# Patient Record
Sex: FEMALE | Race: WHITE | NOT HISPANIC OR LATINO | Employment: OTHER | URBAN - METROPOLITAN AREA
[De-identification: names, ages, dates, MRNs, and addresses within clinical notes are randomized per-mention and may not be internally consistent; named-entity substitution may affect disease eponyms.]

---

## 2017-01-04 ENCOUNTER — ALLSCRIPTS OFFICE VISIT (OUTPATIENT)
Dept: OTHER | Facility: OTHER | Age: 82
End: 2017-01-04

## 2017-01-04 DIAGNOSIS — R53.83 OTHER FATIGUE: ICD-10-CM

## 2017-03-14 DIAGNOSIS — E03.9 HYPOTHYROIDISM: ICD-10-CM

## 2017-06-21 DIAGNOSIS — E03.9 HYPOTHYROIDISM: ICD-10-CM

## 2017-08-30 DIAGNOSIS — E78.5 HYPERLIPIDEMIA: ICD-10-CM

## 2017-08-30 DIAGNOSIS — M81.0 AGE-RELATED OSTEOPOROSIS WITHOUT CURRENT PATHOLOGICAL FRACTURE: ICD-10-CM

## 2017-08-30 DIAGNOSIS — R60.9 EDEMA: ICD-10-CM

## 2017-08-30 DIAGNOSIS — I10 ESSENTIAL (PRIMARY) HYPERTENSION: ICD-10-CM

## 2017-08-30 DIAGNOSIS — R53.83 OTHER FATIGUE: ICD-10-CM

## 2017-10-02 ENCOUNTER — ALLSCRIPTS OFFICE VISIT (OUTPATIENT)
Dept: OTHER | Facility: OTHER | Age: 82
End: 2017-10-02

## 2017-11-08 ENCOUNTER — ALLSCRIPTS OFFICE VISIT (OUTPATIENT)
Dept: OTHER | Facility: OTHER | Age: 82
End: 2017-11-08

## 2017-11-08 ENCOUNTER — GENERIC CONVERSION - ENCOUNTER (OUTPATIENT)
Dept: OTHER | Facility: OTHER | Age: 82
End: 2017-11-08

## 2017-12-27 ENCOUNTER — APPOINTMENT (EMERGENCY)
Dept: CT IMAGING | Facility: HOSPITAL | Age: 82
End: 2017-12-27
Payer: MEDICARE

## 2017-12-27 ENCOUNTER — HOSPITAL ENCOUNTER (EMERGENCY)
Facility: HOSPITAL | Age: 82
Discharge: HOME | End: 2017-12-27
Attending: EMERGENCY MEDICINE
Payer: MEDICARE

## 2017-12-27 ENCOUNTER — ALLSCRIPTS OFFICE VISIT (OUTPATIENT)
Dept: OTHER | Facility: OTHER | Age: 82
End: 2017-12-27

## 2017-12-27 VITALS
TEMPERATURE: 97.8 F | RESPIRATION RATE: 16 BRPM | WEIGHT: 163.4 LBS | DIASTOLIC BLOOD PRESSURE: 94 MMHG | HEART RATE: 71 BPM | OXYGEN SATURATION: 96 % | SYSTOLIC BLOOD PRESSURE: 144 MMHG

## 2017-12-27 DIAGNOSIS — T14.8XXA HEMATOMA: Primary | ICD-10-CM

## 2017-12-27 DIAGNOSIS — S09.90XA INJURY OF HEAD, INITIAL ENCOUNTER: ICD-10-CM

## 2017-12-27 PROCEDURE — 99284 EMERGENCY DEPT VISIT MOD MDM: CPT

## 2017-12-27 PROCEDURE — 70450 CT HEAD/BRAIN W/O DYE: CPT

## 2017-12-27 RX ORDER — LEVOTHYROXINE SODIUM 0.03 MG/1
25 TABLET ORAL DAILY
COMMUNITY
End: 2018-06-18

## 2017-12-27 NOTE — ED PROVIDER NOTES
History  Chief Complaint   Patient presents with    Fall     Pt states she bent over and lost her balance, states she hit the back on her head on the ground, denies LOC, pt takes baby ASA daily  Denies c-spine tenderness     Patient brought to the emergency department via ambulance for evaluation of head trauma  She states that her shoe lace came undone and she tripped over it falling backwards striking the left parietal occipital area  She denies loss of consciousness  She denies headache visual complaints nausea vomiting neck pain or neurologic symptoms including numbness tingling weakness in the extremities  She refuses analgesics at this time  She denies chest pain syncope palpitations or dizziness  Prior to Admission Medications   Prescriptions Last Dose Informant Patient Reported? Taking?   aspirin 81 MG tablet   Yes Yes   Sig: Take 81 mg by mouth daily  atorvastatin (LIPITOR) 80 mg tablet   Yes Yes   Sig: Take 80 mg by mouth daily  benazepril (LOTENSIN) 20 mg tablet   Yes Yes   Sig: Take 10 mg by mouth daily     calcium citrate-vitamin D (CITRACAL+D) 315-200 MG-UNIT per tablet   Yes Yes   Sig: Take 3 tablets by mouth 2 (two) times a day  levothyroxine 25 mcg tablet   Yes Yes   Sig: Take 25 mcg by mouth daily   sertraline (ZOLOFT) 25 mg tablet   Yes Yes   Sig: Take 25 mg by mouth daily  tolterodine (DETROL LA) 4 mg 24 hr capsule   Yes Yes   Sig: Take 4 mg by mouth daily  Facility-Administered Medications: None       Past Medical History:   Diagnosis Date    Hyperlipidemia     Hypertension        History reviewed  No pertinent surgical history  History reviewed  No pertinent family history  I have reviewed and agree with the history as documented  Social History   Substance Use Topics    Smoking status: Former Smoker    Smokeless tobacco: Not on file    Alcohol use 4 2 oz/week     7 Glasses of wine per week        Review of Systems   Constitutional: Negative    Negative for activity change, appetite change, chills, diaphoresis, fatigue and fever  HENT: Negative  Eyes: Negative  Negative for photophobia and visual disturbance  Respiratory: Negative  Negative for chest tightness, shortness of breath, wheezing and stridor  Cardiovascular: Negative  Negative for chest pain, palpitations and leg swelling  Gastrointestinal: Negative  Negative for abdominal pain, diarrhea, nausea and vomiting  Endocrine: Negative  Genitourinary: Negative  Musculoskeletal: Negative  Negative for back pain, neck pain and neck stiffness  Skin: Positive for wound  Allergic/Immunologic: Negative  Neurological: Negative  Negative for dizziness, tremors, seizures, syncope, facial asymmetry, speech difficulty, weakness, light-headedness, numbness and headaches  Hematological: Negative  Does not bruise/bleed easily  Psychiatric/Behavioral: Negative  Physical Exam  ED Triage Vitals [12/27/17 1823]   Temperature Pulse Respirations Blood Pressure SpO2   97 8 °F (36 6 °C) 71 16 144/94 96 %      Temp Source Heart Rate Source Patient Position - Orthostatic VS BP Location FiO2 (%)   Oral Monitor Sitting Right arm --      Pain Score       No Pain           Orthostatic Vital Signs  Vitals:    12/27/17 1823   BP: 144/94   Pulse: 71   Patient Position - Orthostatic VS: Sitting       Physical Exam   Constitutional: She is oriented to person, place, and time  She appears well-developed and well-nourished  Nontoxic appearance without respiratory distress  Patient can speak in full sentences and engage in normal conversation  She seems comfortable sitting upright in the stretcher  HENT:   Head: Normocephalic  Right Ear: External ear normal    Left Ear: External ear normal    Mouth/Throat: Oropharynx is clear and moist    Large left parietal occipital hematoma without laceration or bleeding     Eyes: Conjunctivae and EOM are normal  Pupils are equal, round, and reactive to light    Neck: Normal range of motion  Neck supple  Cardiovascular: Normal rate, regular rhythm, normal heart sounds and intact distal pulses  Pulmonary/Chest: Effort normal and breath sounds normal  No respiratory distress  She has no wheezes  She has no rales  She exhibits no tenderness  Abdominal: Soft  Bowel sounds are normal  She exhibits no distension and no mass  There is no tenderness  There is no rebound and no guarding  No hernia  Musculoskeletal: Normal range of motion  She exhibits no edema, tenderness or deformity  Neurological: She is alert and oriented to person, place, and time  She has normal reflexes  She displays normal reflexes  No cranial nerve deficit or sensory deficit  She exhibits normal muscle tone  Coordination normal    Skin: Skin is warm and dry  No rash noted  No erythema  No pallor  Psychiatric: She has a normal mood and affect  Her behavior is normal  Judgment and thought content normal    Nursing note and vitals reviewed  ED Medications  Medications - No data to display    Diagnostic Studies  Results Reviewed     None                 CT head without contrast   Final Result by Basia Miranda MD (12/27 1900)      Left parietal scalp soft tissue swelling  No acute intracranial abnormality  Subcentimeter calcified right paraclinoid meningioma  Moderate chronic microvascular ischemia  Workstation performed: [id number]                    Procedures  Procedures       Phone Contacts  ED Phone Contact    ED Course  ED Course                                MDM  CritCare Time    Disposition  Final diagnoses:   Hematoma   Injury of head, initial encounter     Time reflects when diagnosis was documented in both MDM as applicable and the Disposition within this note     Time User Action Codes Description Comment    12/27/2017  7:27 PM Verenice Irizarry [address] Poncho  [id number]] Hematoma     12/27/2017  7:28 PM Kristen Irizarry Add [[id number]] Injury of head, initial encounter       ED Disposition     ED Disposition Condition Comment    Discharge  Lulu Morris discharge to home/self care  Condition at discharge: Stable        Follow-up Information     Follow up With Specialties Details Why [facility] physician  Schedule an appointment as soon as possible for a visit          Patient's Medications   Discharge Prescriptions    No medications on file     No discharge procedures on file      ED Provider  Electronically Signed by           Miguel Burgess MD  12/27/17 2390

## 2017-12-28 NOTE — DISCHARGE INSTRUCTIONS
Head Injury   WHAT YOU NEED TO KNOW:   What causes a head injury? A head injury is most often caused by a blow to the head  This may occur from a fall, bicycle injury, sports injury, being struck in the head, or a motor vehicle accident  What are the symptoms of a head injury? You may have an open wound, swelling, or bruising on your head  Right after the injury, you may be confused  Symptoms may last anywhere from a few hours to a few weeks  You may have any of the following:  · Mild to moderate headache    · Dizziness or loss of balance    · Nausea or vomiting    · Change in mood, such as feeling restless or irritable    · Trouble thinking, remembering, or concentrating    · Ringing in the ears or neck pain    · Drowsiness or decreased amount of energy    · Trouble sleeping  How is a head injury diagnosed? · Tell your healthcare provider about your injury and symptoms  The provider will do an exam to check your brain function  He or she will check how your pupils react to light  He will check your memory, hand grasp, and balance  · You may need a CT scan to check for bleeding or major damage to your skull or brain  You may be given contrast liquid to help the pictures show up better  Tell a healthcare provider if you have ever had an allergic reaction to contrast liquid  How is a head injury treated? You may be given medicine to decrease pain  Other treatments may depend on how severe your head injury is  How can I manage my symptoms? · Rest  or do quiet activities for 24 to 48 hours  Limit your time watching TV, using the computer, or doing tasks that require a lot of thinking  Slowly return to your normal activities as directed  Do not play sports or do activities that may cause you to get hit in the head  Ask your healthcare provider when you can return to sports  · Apply ice  on your head for 15 to 20 minutes every hour or as directed  Use an ice pack, or put crushed ice in a plastic bag  Cover it with a towel before you apply it to your skin  Ice helps prevent tissue damage and decreases swelling and pain  · Have someone stay with you for 24 hours  or as directed  This person can monitor you for complications and call [phone]  When you are awake the person should ask you a few questions to see if you are thinking clearly  An example would be to ask your name or your address  How can I help prevent another head injury? · Wear a helmet that fits properly  Do this when you play sports, or ride a bike, scooter, or skateboard  Helmets help decrease your risk of a serious head injury  Talk to your healthcare provider about other ways you can protect yourself if you play sports  · Wear your seat belt every time you are in a car  This helps to decrease your risk for a head injury if you are in a car accident  Call 911 or have someone else call for any of the following:   · You cannot be woken  · You have a seizure  · You stop responding to others or you faint  · You have blurry or double vision  · Your speech becomes slurred or confused  · You have arm or leg weakness, loss of feeling, or new problems with coordination  · Your pupils are larger than usual or one pupil is a different size than the other  · You have blood or clear fluid coming out of your ears or nose  When should I seek immediate care? · You have repeated or forceful vomiting  · You feel confused  · Your headache gets worse or becomes severe  · You or someone caring for you notices that you are harder to wake than usual   When should I contact my healthcare provider? · Your symptoms last longer than 6 weeks after the injury  · You have questions or concerns about your condition or care  CARE AGREEMENT:   You have the right to help plan your care  Learn about your health condition and how it may be treated  Discuss treatment options with your caregivers to decide what care you want to receive  You always have the right to refuse treatment  The above information is an  only  It is not intended as medical advice for individual conditions or treatments  Talk to your doctor, nurse or pharmacist before following any medical regimen to see if it is safe and effective for you  © 2017 [facility] Information is for End User's use only and may not be sold, redistributed or otherwise used for commercial purposes  All illustrations and images included in CareNotes® are the copyrighted property of [employer]  or Osvaldo Santana  [facility]   WHAT YOU NEED TO KNOW:   A hematoma is a collection of blood  A bruise is a type of hematoma  A hematoma may form in a muscle or in the tissues just under the skin  A hematoma that forms under the skin will feel like a bump or hard mass  Hematomas can happen anywhere in your body, including in your brain  Your body may break down and absorb a mild hematoma on its own  A more serious hematoma may need treatment  DISCHARGE INSTRUCTIONS:   Medicines: You may need any of the following:  · Prescription pain medicine  may be given  Ask how to take this medicine safely  · NSAIDs , such as ibuprofen, help decrease swelling, pain, and fever  This medicine is available with or without a doctor's order  NSAIDs can cause stomach bleeding or kidney problems in certain people  If you take blood thinner medicine, always ask your healthcare provider if NSAIDs are safe for you  Always read the medicine label and follow directions  · Antibiotics  prevent or treat a bacterial infection  · Take your medicine as directed  Contact your healthcare provider if you think your medicine is not helping or if you have side effects  Tell him of her if you are allergic to any medicine  Keep a list of the medicines, vitamins, and herbs you take  Include the amounts, and when and why you take them  Bring the list or the pill bottles to follow-up visits  Carry your medicine list with you in case of an emergency  Return to the emergency department if:   · You have new or worsening pain, or pain that does not get better with medicine  · You have a fever  · You have trouble moving the body part that has the hematoma  Contact your healthcare provider if:   · You have questions or concerns about your condition or care  Follow up with your healthcare provider as directed: You may need to have surgery if your hematoma is severe  You may also need other tests to make sure there is no other damage that needs to be treated  Write down your questions so you remember to ask them during your visits  Self-care:   · Rest the area  Rest will help your body heal and will also help prevent more damage  · Apply ice as directed  Ice helps reduce swelling  Ice may also help prevent tissue damage  Use an ice pack, or put crushed ice in a bag  Cover it with a towel  Place it on your hematoma for 20 minutes every hour, or as directed  Ask how many times each day to apply ice, and for how many days  · Compress the injury if possible  Lightly wrap the injury with an elastic or soft bandage  This may help control swelling  Ask your healthcare provider how to wrap your injury properly  · Elevate the area as directed  If possible, raise the area above the level of your heart as often as you can  This will help decrease swelling  · Keep the hematoma covered with a bandage  This will help protect the area while it heals  © 2017 [address] [employer] Information is for End User's use only and may not be sold, redistributed or otherwise used for commercial purposes  All illustrations and images included in CareNotes® are the copyrighted property of [employer] Inc  or Osvaldo Santana  The above information is an  only  It is not intended as medical advice for individual conditions or treatments   Talk to your doctor, nurse or pharmacist before following any medical regimen to see if it is safe and effective for you

## 2018-01-09 NOTE — PROGRESS NOTES
Assessment    1  Acute bronchitis (466 0) (J20 9)    Plan    1  Benzonatate 100 MG Oral Capsule; TAKE 1 CAPSULE 2-3 TIMES DAILY AS   DIRECTED   2  Levofloxacin 250 MG Oral Tablet (Levaquin); One tab daily for one week     1  bronchitis; acute, symptomatic,  Will order Tessalon Perles 200 mg every 8 when necessary  Will also order Levaquin 250 mg daily x1 week  Order to view chest x-ray rule out pneumonia     Chief Complaint  Chief Complaint Free Text Note Form: Cough      History of Present Illness  Bronchitis, Acute, Adult (Brief): Symptoms:  productive cough, wheezing, shortness of breath, fatigue, fever and myalgias, but no non-productive cough, no pleuritic chest pain and no chest pain  No associated symptoms are reported  Review of Systems  Complete-Female:   Constitutional: No fever, no chills, feels well, no tiredness, no recent weight gain or weight loss  Eyes: No complaints of eye pain, no red eyes, no eyesight problems, no discharge, no dry eyes, no itching of eyes  ENT: no complaints of earache, no loss of hearing, no nose bleeds, no nasal discharge, no sore throat, no hoarseness  Cardiovascular: No complaints of slow heart rate, no fast heart rate, no chest pain, no palpitations, no leg claudication, no lower extremity edema  Respiratory: No complaints of shortness of breath, no wheezing, no cough, no SOB on exertion, no orthopnea, no PND and as noted in HPI  Gastrointestinal: No complaints of abdominal pain, no constipation, no nausea or vomiting, no diarrhea, no bloody stools  Musculoskeletal: as noted in HPI  Integumentary: No complaints of skin rash or lesions, no itching, no skin wounds, no breast pain or lump  Neurological: as noted in HPI  Psychiatric: Not suicidal, no sleep disturbance, no anxiety or depression, no change in personality, no emotional problems     Endocrine: No complaints of proptosis, no hot flashes, no muscle weakness, no deepening of the voice, no feelings of weakness  Hematologic/Lymphatic: No complaints of swollen glands, no swollen glands in the neck, does not bleed easily, does not bruise easily  Active Problems    1  Balance problem (781 99) (R26 89)   2  Edema (782 3) (R60 9)   3  Encounter for routine gynecological examination (V72 31) (Z01 419)   4  Encounter for screening mammogram for malignant neoplasm of breast (V76 12)   (Z12 31)   5  Gait disturbance (781 2) (R26 9)   6  Hyperlipidemia (272 4) (E78 5)   7  Hypertension (401 9) (I10)   8  Neurogenic bladder (596 54) (N31 9)   9  Osteopenia (733 90) (M85 80)   10  Osteoporosis (733 00) (M81 0)   11  Primary localized osteoarthritis of right hip (715 15) (M16 11)   12  Right hip pain (719 45) (M25 551)   13  Visit for screening mammogram (V76 12) (Z12 31)    Surgical History    1  History of Dilation And Curettage    Family History  Father    1  Family history of Heart Disease (V17 49)    Social History    · Being A Social Drinker   · Former smoker (V15 82) (K08 620)   · Moderate Exercising Less Than 3 Times A Week    Current Meds   1  Aspirin 81 MG TABS; Therapy: ([place]) to Recorded   2  Atorvastatin Calcium TABS Recorded   3  Benazepril HCl - 20 MG Oral Tablet; Take 1 tablet twice daily Recorded   4  Calcium 1000 + D TABS; Therapy: ([place]) to Recorded   5  Detrol LA 4 MG Oral Capsule Extended Release 24 Hour; take 1 capsule daily; Therapy: ([place] [facility]) to Recorded   6  Lipitor 80 MG Oral Tablet; Take 1 tablet daily Recorded   7  Sertraline HCl - 25 MG Oral Tablet; Therapy: (Recorded:06Jan2016) to Recorded    Allergies    1   No Known Drug Allergies    Vitals  Signs [Data Includes: Current Encounter]    Temperature: 99 2 F  Heart Rate: 72  Respiration: 16  Systolic: 236  Diastolic: 72  Height: 5 ft 4 3 in  Weight: 155 lb   BMI Calculated: 26 36  BSA Calculated: 1 77  O2 Saturation: 91    Physical Exam  General Complete Exam (Brief):   Constitutional General appearance: No acute distress, well appearing and well nourished  Eyes   Conjunctiva and lids: No swelling, erythema, or discharge  Pupils and irises: Equal, round and reactive to light  Ears, Nose, Mouth, and Throat   Otoscopic examination: Tympanic membrance translucent with normal light reflex  Canals patent without erythema  Oropharynx: Normal with no erythema, edema, exudate or lesions  Pulmonary   Auscultation of lungs: Abnormal   Occasional rhonchi clear with cough  Cardiovascular   Auscultation of heart: Normal rate and rhythm, normal S1 and S2, without murmurs  Examination of extremities for edema and/or varicosities: Normal     Abdomen   Abdomen: Non-tender, no masses  Liver and spleen: No hepatomegaly or splenomegaly  Musculoskeletal slightly off balance  Digits and nails: Abnormal   arthritic changes  Inspection/palpation of joints, bones, and muscles: Abnormal   arthritic changes  Skin chronic changes  Neurologic   Cranial nerves: Cranial nerves 2-12 intact  Reflexes: 2+ and symmetric  Sensation: No sensory loss  Psychiatric   Orientation to person, place and time: Normal     Mood and affect: Normal        Signatures   Electronically signed by : Mirta Cole DO;  Apr 22 2016  1:12PM EST                       (Author)

## 2018-01-10 NOTE — PROGRESS NOTES
Assessment    1  Dizziness (780 4) (R42)    Plan  1  Dizziness; multifactorial   Patient's blood pressure dropped to 115/60 when she was standing  I recommended she cut her benazepril from 20 mg to 10 mg p  o  daily  She will have blood pressure checks and [facility] throughout the [facility]  Monitor and follow up  She does admit to a fall about 3 months ago where she hit her head on the end table  I did offer CT scan of her head at this time she wishes to wait to see if lowering of blood pressure medication makes her symptoms go away  Recent lab work including CMP CBC and lipids are all within normal limits  Will be having a follow-up TSH done soon as medications were recently altered  Discussion/Summary  Counseling Documentation With Imm: The was counseled regarding impressions  total time of encounter was 25 minutes and 15 minutes was spent counseling  Chief Complaint  Chief Complaint Free Text Note Form: dizzy      History of Present Illness  HPI: Patient seen and examined  Patient complains of feeling dizzy for the past 2-3 days  Worse when she stands up  No loss of consciousness  No associated nausea vomiting diarrhea and diaphoresis or chest pain  Vertigo (Brief): The patient is being seen for an initial evaluation of vertigo  Symptoms:  dizziness, but no sensation of movement, no spinning sensation, no loss of balance, no difficulty ambulating, no hearing loss, no nausea and no vomiting  No associated symptoms are reported  Review of Systems  Complete-Female:   Constitutional: as noted in HPI  Eyes: No complaints of eye pain, no red eyes, no eyesight problems, no discharge, no dry eyes, no itching of eyes  ENT: no complaints of earache, no loss of hearing, no nose bleeds, no nasal discharge, no sore throat, no hoarseness  Cardiovascular: No complaints of slow heart rate, no fast heart rate, no chest pain, no palpitations, no leg claudication, no lower extremity edema  Respiratory: No complaints of shortness of breath, no wheezing, no cough, no SOB on exertion, no orthopnea, no PND and as noted in HPI  Gastrointestinal: No complaints of abdominal pain, no constipation, no nausea or vomiting, no diarrhea, no bloody stools  Genitourinary: No complaints of dysuria, no incontinence, no pelvic pain, no dysmenorrhea, no vaginal discharge or bleeding  Musculoskeletal: No complaints of arthralgias, no myalgias, no joint swelling or stiffness, no limb pain or swelling  Integumentary: No complaints of skin rash or lesions, no itching, no skin wounds, no breast pain or lump  Neurological: as noted in HPI  Psychiatric: Not suicidal, no sleep disturbance, no anxiety or depression, no change in personality, no emotional problems  Endocrine: No complaints of proptosis, no hot flashes, no muscle weakness, no deepening of the voice, no feelings of weakness  Hematologic/Lymphatic: No complaints of swollen glands, no swollen glands in the neck, does not bleed easily, does not bruise easily  Active Problems    1  Acquired hypothyroidism (244 9) (E03 9)   2  Acute bronchitis (466 0) (J20 9)   3  Balance problem (781 99) (R26 89)   4  Edema (782 3) (R60 9)   5  Encounter for routine gynecological examination (V72 31) (Z01 419)   6  Encounter for screening mammogram for malignant neoplasm of breast (V76 12)   (Z12 31)   7  Fatigue (780 79) (R53 83)   8  Gait disturbance (781 2) (R26 9)   9  Hyperlipidemia (272 4) (E78 5)   10  Hypertension (401 9) (I10)   11  Neurogenic bladder (596 54) (N31 9)   12  Osteopenia (733 90) (M85 80)   13  Osteoporosis (733 00) (M81 0)   14  Primary localized osteoarthritis of right hip (715 15) (M16 11)   15  Right hip pain (719 45) (M25 551)   16  Visit for screening mammogram (V76 12) (Z12 31)    Surgical History    1  History of Dilation And Curettage  Surgical History Reviewed: The surgical history was reviewed and updated today         Family History  Father    1  Family history of Heart Disease (V17 49)  Family History Reviewed: The family history was reviewed and updated today  Social History    · Being A Social Drinker   · Former smoker (B77 66) (F26 040)   · Moderate Exercising Less Than 3 Times A Week  Social History Reviewed: The social history was reviewed and updated today  Current Meds   1  Aspirin 81 MG TABS; Therapy: (Joi Diop) to Recorded   2  Atorvastatin Calcium TABS Recorded   3  Benazepril HCl - 20 MG Oral Tablet; TAKE 1 TABLET DAILY AS DIRECTED; Therapy: (Recorded:[id number]) to Recorded   4  Calcium 1000 + D TABS; Therapy: (Joi Diop) to Recorded   5  Detrol LA 4 MG Oral Capsule Extended Release 24 Hour; take 1 capsule daily; Therapy: (Joi Diop) to Recorded   6  Levothyroxine Sodium 25 MCG Oral Tablet; TAKE 1 TABLET BY MOUTH DAILY; Therapy: (Recorded:31Mar2017) to Recorded   7  Lipitor 80 MG Oral Tablet; Take 1 tablet daily Recorded   8  Sertraline HCl - 25 MG Oral Tablet; Therapy: ([id number]) to Recorded  Medication List Reviewed: The medication list was reviewed and updated today  Allergies    1  No Known Drug Allergies    Vitals  Signs    Temperature: 98 3 F  Heart Rate: 66  Respiration: 18  Systolic: 805  Diastolic: 77    Physical Exam  General Complete Exam (Brief):   Constitutional   General appearance: No acute distress, well appearing and well nourished  Eyes   Conjunctiva and lids: No swelling, erythema, or discharge  Pupils and irises: Equal, round and reactive to light  Ears, Nose, Mouth, and Throat   Otoscopic examination: Tympanic membrance translucent with normal light reflex  Canals patent without erythema  Oropharynx: Normal with no erythema, edema, exudate or lesions  Pulmonary   Auscultation of lungs: Clear to auscultation  cough  Cardiovascular   Auscultation of heart: Normal rate and rhythm, normal S1 and S2, without murmurs  Examination of extremities for edema and/or varicosities: Normal     Abdomen   Abdomen: Non-tender, no masses  Liver and spleen: No hepatomegaly or splenomegaly  Musculoskeletal   Gait and station: Normal     Digits and nails: Abnormal   arthritic changes  Inspection/palpation of joints, bones, and muscles: Abnormal   arthritic changes  Skin chronic changes  Neurologic   Cranial nerves: Cranial nerves 2-12 intact  Reflexes: 2+ and symmetric  Sensation: No sensory loss      Psychiatric   Orientation to person, place and time: Normal     Mood and affect: Normal        Signatures   Electronically signed by : Paula Griffin DO; Oct  2 2017  3:14PM EST                       (Author)

## 2018-01-12 NOTE — PROGRESS NOTES
Assessment    1  Fatigue (780 79) (R53 83)    Plan    1  (1) CBC/ PLT (NO DIFF); Status:Active; Requested [id number];    2  (1) COMPREHENSIVE METABOLIC PANEL; Status:Active; Requested [id number];    3  (1) T4, FREE; Status:Active; Requested [id number];    4  (1) TSH; Status:Active; Requested [id number];     #1 fatigue; multifactorial  Patient was seen by Dr Micah Benites PMR for bilateral leg pain  Head describe some symptoms consistent with fatigue  Concerned it could be her thyroid  Patient also has diagnoses of depression  At this point I'll first check labs including a CMP CBC TSH and free T4  If this proved to be negative I may consider adjusting her depression medication  Monitor and follow-up     Chief Complaint  Chief Complaint Free Text Note Form: Fatigue      History of Present Illness  Fatigue: The patient is being seen for an initial evaluation of fatigue  Symptoms:  fatigue  The patient is currently experiencing symptoms  Associated symptoms:  somnolence, depressed mood, arthralgias, diarrhea and cold intolerance, but no cognitive impairment, no myalgias, no fever, no swollen glands, no cough, no dyspnea, no irregular heartbeat, no chest pain, no abdominal pain, no nausea, no vomiting and no constipation  Review of Systems  Complete-Female:   Constitutional: as noted in HPI  Eyes: No complaints of eye pain, no red eyes, no eyesight problems, no discharge, no dry eyes, no itching of eyes  ENT: no complaints of earache, no loss of hearing, no nose bleeds, no nasal discharge, no sore throat, no hoarseness  Cardiovascular: No complaints of slow heart rate, no fast heart rate, no chest pain, no palpitations, no leg claudication, no lower extremity edema  Respiratory: No complaints of shortness of breath, no wheezing, no cough, no SOB on exertion, no orthopnea, no PND and as noted in HPI     Gastrointestinal: No complaints of abdominal pain, no constipation, no nausea or vomiting, no diarrhea, no bloody stools  Genitourinary: No complaints of dysuria, no incontinence, no pelvic pain, no dysmenorrhea, no vaginal discharge or bleeding  Musculoskeletal: as noted in HPI  Integumentary: No complaints of skin rash or lesions, no itching, no skin wounds, no breast pain or lump  Neurological: as noted in HPI  Psychiatric: Not suicidal, no sleep disturbance, no anxiety or depression, no change in personality, no emotional problems  Endocrine: No complaints of proptosis, no hot flashes, no muscle weakness, no deepening of the voice, no feelings of weakness  Hematologic/Lymphatic: No complaints of swollen glands, no swollen glands in the neck, does not bleed easily, does not bruise easily  Active Problems    1  Acute bronchitis (466 0) (J20 9)   2  Balance problem (781 99) (R26 89)   3  Edema (782 3) (R60 9)   4  Encounter for routine gynecological examination (V72 31) (Z01 419)   5  Encounter for screening mammogram for malignant neoplasm of breast (V76 12)   (Z12 31)   6  Gait disturbance (781 2) (R26 9)   7  Hyperlipidemia (272 4) (E78 5)   8  Hypertension (401 9) (I10)   9  Neurogenic bladder (596 54) (N31 9)   10  Osteopenia (733 90) (M85 80)   11  Osteoporosis (733 00) (M81 0)   12  Primary localized osteoarthritis of right hip (715 15) (M16 11)   13  Right hip pain (719 45) (M25 551)   14  Visit for screening mammogram (V76 12) (Z12 31)    Surgical History    1  History of Dilation And Curettage    Family History  Father    1  Family history of Heart Disease (V17 49)    Social History    · Being A Social Drinker   · Former smoker (V15 82) (P04 069)   · Moderate Exercising Less Than 3 Times A Week    Current Meds   1  Aspirin 81 MG TABS; Therapy: (Brenda Walter) to Recorded   2  Atorvastatin Calcium TABS Recorded   3  Benazepril HCl - 20 MG Oral Tablet; Take 1 tablet twice daily Recorded   4  Calcium 1000 + D TABS; Therapy: (Brenda Walter) to Recorded   5   Detrol LA 4 MG Oral Capsule Extended Release 24 Hour; take 1 capsule daily; Therapy: (Artist Dawson) to Recorded   6  Lipitor 80 MG Oral Tablet; Take 1 tablet daily Recorded   7  Sertraline HCl - 25 MG Oral Tablet; Therapy: (Recorded:06Jan2016) to Recorded    Allergies    1  No Known Drug Allergies    Vitals  Signs    Temperature: 97 5 F  Heart Rate: 58  Respiration: 16  Systolic: 176  Diastolic: 70  Height: 5 ft 4 3 in  Weight: 159 lb   BMI Calculated: 27 04  BSA Calculated: 1 78  O2 Saturation: 95    Physical Exam  General Complete Exam (Brief):   Constitutional   General appearance: No acute distress, well appearing and well nourished  Eyes   Conjunctiva and lids: No swelling, erythema, or discharge  Pupils and irises: Equal, round and reactive to light  Ears, Nose, Mouth, and Throat   Otoscopic examination: Tympanic membrance translucent with normal light reflex  Canals patent without erythema  Oropharynx: Normal with no erythema, edema, exudate or lesions  Pulmonary   Auscultation of lungs: Clear to auscultation  cough  Cardiovascular   Auscultation of heart: Normal rate and rhythm, normal S1 and S2, without murmurs  Examination of extremities for edema and/or varicosities: Normal     Abdomen   Abdomen: Non-tender, no masses  Liver and spleen: No hepatomegaly or splenomegaly  Musculoskeletal   Gait and station: Normal     Digits and nails: Abnormal   arthritic changes  Inspection/palpation of joints, bones, and muscles: Abnormal   arthritic changes  Skin chronic changes  Neurologic   Cranial nerves: Cranial nerves 2-12 intact  Reflexes: 2+ and symmetric  Sensation: No sensory loss      Psychiatric   Orientation to person, place and time: Normal     Mood and affect: Normal        Provider Comments  Provider Comments:   Time 20 minutes with 15 minutes to coordination of care      Signatures   Electronically signed by : Ernesto Pulido DO; Jan 4 2017  3:56PM EST (Author)

## 2018-01-13 VITALS
DIASTOLIC BLOOD PRESSURE: 70 MMHG | WEIGHT: 159 LBS | HEART RATE: 58 BPM | SYSTOLIC BLOOD PRESSURE: 126 MMHG | BODY MASS INDEX: 27.14 KG/M2 | TEMPERATURE: 97.5 F | RESPIRATION RATE: 16 BRPM | HEIGHT: 64 IN | OXYGEN SATURATION: 95 %

## 2018-01-14 VITALS
SYSTOLIC BLOOD PRESSURE: 122 MMHG | RESPIRATION RATE: 18 BRPM | DIASTOLIC BLOOD PRESSURE: 77 MMHG | HEART RATE: 66 BPM | TEMPERATURE: 98.3 F

## 2018-01-15 NOTE — PROGRESS NOTES
Assessment    1  Right hip pain (479 45) (K80 293)    Plan   1  right hip pain; symptomatic  Radiates to right knee  Currently is doing physical therapy without relief  I'll refer to orthopedics for evaluation and treatment  History of Present Illness  Hip Pain: The patient is being seen for a routine clinic follow-up of hip pain  Symptoms:  hip pain, thigh pain and knee pain, but no groin pain, no localized bruising, no localized swelling, no localized redness, no localized warmth, no limping, no refusal to bear weight and no decreased hip range of motion  The patient is currently experiencing symptoms  No associated symptoms are reported  Current treatment includes acetaminophen and physical therapy  By report, there is good adherence with treatment, good tolerance of treatment and fair symptom control  Review of Systems  Complete-Female:   Constitutional: No fever, no chills, feels well, no tiredness, no recent weight gain or weight loss  Eyes: No complaints of eye pain, no red eyes, no eyesight problems, no discharge, no dry eyes, no itching of eyes  ENT: no complaints of earache, no loss of hearing, no nose bleeds, no nasal discharge, no sore throat, no hoarseness  Cardiovascular: No complaints of slow heart rate, no fast heart rate, no chest pain, no palpitations, no leg claudication, no lower extremity edema  Respiratory: No complaints of shortness of breath, no wheezing, no cough, no SOB on exertion, no orthopnea, no PND  Gastrointestinal: No complaints of abdominal pain, no constipation, no nausea or vomiting, no diarrhea, no bloody stools  Musculoskeletal: as noted in HPI  Integumentary: No complaints of skin rash or lesions, no itching, no skin wounds, no breast pain or lump  Neurological: as noted in HPI  Psychiatric: Not suicidal, no sleep disturbance, no anxiety or depression, no change in personality, no emotional problems     Endocrine: No complaints of proptosis, no hot flashes, no muscle weakness, no deepening of the voice, no feelings of weakness  Hematologic/Lymphatic: No complaints of swollen glands, no swollen glands in the neck, does not bleed easily, does not bruise easily  Active Problems    1  Balance problem (781 99) (R26 89)   2  Edema (782 3) (R60 9)   3  Encounter for routine gynecological examination (V72 31) (Z01 419)   4  Encounter for screening mammogram for malignant neoplasm of breast (V76 12)   (Z12 31)   5  Gait disturbance (781 2) (R26 9)   6  Hyperlipidemia (272 4) (E78 5)   7  Hypertension (401 9) (I10)   8  Neurogenic bladder (596 54) (N31 9)   9  Osteopenia (733 90) (M85 80)   10  Osteoporosis (733 00) (M81 0)   11  Visit for screening mammogram (V76 12) (Z12 31)    Surgical History    1  History of Dilation And Curettage    Family History    1  Family history of Heart Disease (V17 49)    Social History    · Being A Social Drinker   · Former smoker (V15 82) (E49 068)   · Moderate Exercising Less Than 3 Times A Week    Current Meds   1  Aspirin 81 MG Oral Tablet; Therapy: (Guerda Lay) to Recorded   2  Benazepril HCl - 20 MG Oral Tablet; Take 1 tablet twice daily Recorded   3  Calcium 1000 + D TABS; Therapy: (Guerda Lay) to Recorded   4  Detrol LA 4 MG Oral Capsule Extended Release 24 Hour; take 1 capsule daily; Therapy: (Guerda Lay) to Recorded   5  Lipitor 80 MG Oral Tablet; Take 1 tablet daily Recorded   6  Sertraline HCl - 25 MG Oral Tablet; Therapy: (Recorded:06Jan2016) to Recorded    Allergies    1  No Known Drug Allergies    Vitals  Signs [Data Includes: Current Encounter]    Temperature: 97 4 F  Heart Rate: 64  Respiration: 16  Systolic: 297  Diastolic: 60  Height: 5 ft 4 3 in  Weight: 155 lb   BMI Calculated: 26 36  BSA Calculated: 1 77  O2 Saturation: 97    Physical Exam  General Complete Exam (Brief):   Constitutional   General appearance: No acute distress, well appearing and well nourished      Eyes Conjunctiva and lids: No swelling, erythema, or discharge  Pupils and irises: Equal, round and reactive to light  Ears, Nose, Mouth, and Throat   Otoscopic examination: Tympanic membrance translucent with normal light reflex  Canals patent without erythema  Oropharynx: Normal with no erythema, edema, exudate or lesions  Pulmonary   Auscultation of lungs: Clear to auscultation  Cardiovascular   Auscultation of heart: Normal rate and rhythm, normal S1 and S2, without murmurs  Examination of extremities for edema and/or varicosities: Normal     Abdomen   Abdomen: Non-tender, no masses  Liver and spleen: No hepatomegaly or splenomegaly  Musculoskeletal slightly off balance  Digits and nails: Abnormal   arthritic changes  Inspection/palpation of joints, bones, and muscles: Abnormal   arthritic changes  Skin chronic changes  Neurologic   Cranial nerves: Cranial nerves 2-12 intact  Reflexes: 2+ and symmetric  Sensation: No sensory loss      Psychiatric   Orientation to person, place and time: Normal     Mood and affect: Normal        Signatures   Electronically signed by : Dhara Goff DO; Feb 17 2016  2:59PM EST                       (Author)

## 2018-01-22 VITALS
HEART RATE: 65 BPM | DIASTOLIC BLOOD PRESSURE: 60 MMHG | BODY MASS INDEX: 26.95 KG/M2 | SYSTOLIC BLOOD PRESSURE: 130 MMHG | WEIGHT: 158.5 LBS | OXYGEN SATURATION: 96 % | RESPIRATION RATE: 16 BRPM

## 2018-01-23 VITALS
TEMPERATURE: 96.6 F | WEIGHT: 157 LBS | HEIGHT: 64 IN | DIASTOLIC BLOOD PRESSURE: 70 MMHG | RESPIRATION RATE: 16 BRPM | HEART RATE: 63 BPM | BODY MASS INDEX: 26.8 KG/M2 | SYSTOLIC BLOOD PRESSURE: 150 MMHG | OXYGEN SATURATION: 95 %

## 2018-01-23 NOTE — PROGRESS NOTES
Assessment    1  Pain with raising of right upper extremity (729 5) (M43 704)   2  History of removal of cyst (V15 29) (Z98 890)    Plan  1  Right arm pain - Due to positioning during procedure  Patient states she is taking Advil which is helping her  She states that Advil works better for her than Tramadol  If pain worsens, patient instructed to come back in  Will consider X-Ray and PT  if pain does not improve  2   S/P Cyst Removal - Procedure performed on 12/15/17  Incision healing well, no increased redness or drainage noticed  Patient A-Febrile  Keep area clean and dry  Patient instructed that follow-up appointment recommended for signs and symptoms of infection  Chief Complaint  Chief Complaint Free Text Note Form: PT complaining of abscess on right that was removed on 12/15/2017      History of Present Illness  Patient was seen and examined for right arm tenderness  She had a cyst removed underneath her right axilla on 12/15/17  She stated that the area where the cyst was removed does not hurt her, however her right arm feels tender  She stated that her arm felt weak when she went to move the lever to her recliner  She also stated that during her procedure her arm had to be elevated and pulled back for a length of time and believes her discomfort is due to this  Review of Systems  Complete-Female:   Constitutional: No fever, no chills, feels well, no tiredness, no recent weight gain or weight loss  Cardiovascular: No complaints of slow heart rate, no fast heart rate, no chest pain, no palpitations, no leg claudication, no lower extremity edema  Respiratory: No complaints of shortness of breath, no wheezing, no cough, no SOB on exertion, no orthopnea, no PND  Gastrointestinal: No complaints of abdominal pain, no constipation, no nausea or vomiting, no diarrhea, no bloody stools     Musculoskeletal: limb pain, myalgias and right arm, but no joint swelling, no joint stiffness and no limb swelling  Active Problems    1  Acquired hypothyroidism (244 9) (E03 9)   2  Acute bronchitis (466 0) (J20 9)   3  Balance problem (781 99) (R26 89)   4  Dizziness (780 4) (R42)   5  Edema (782 3) (R60 9)   6  Encounter for routine gynecological examination (V72 31) (Z01 419)   7  Encounter for screening mammogram for malignant neoplasm of breast (V76 12)   (Z12 31)   8  Fatigue (780 79) (R53 83)   9  Gait disturbance (781 2) (R26 9)   10  Hyperlipidemia (272 4) (E78 5)   11  Hypertension (401 9) (I10)   12  Neurogenic bladder (596 54) (N31 9)   13  Osteopenia (733 90) (M85 80)   14  Osteoporosis (733 00) (M81 0)   15  Primary localized osteoarthritis of right hip (715 15) (M16 11)   16  Right hip pain (719 45) (M25 551)   17  Visit for screening mammogram (V76 12) (Z12 31)    Surgical History    1  History of Dilation And Curettage    Family History  Father    1  Family history of Heart Disease (V17 49)    Social History    · Being A Social Drinker   · Former smoker (V15 82) (Y39 135)   · Moderate Exercising Less Than 3 Times A Week    Current Meds   1  Aspirin 81 MG TABS; Therapy: (94 31 11) to Recorded   2  Atorvastatin Calcium TABS Recorded   3  Benazepril HCl - 20 MG Oral Tablet; TAKE 1 TABLET DAILY AS DIRECTED; Therapy: (Recorded:87Waz5807) to Recorded   4  Calcium 1000 + D TABS; Therapy: (94 31 11) to Recorded   5  Detrol LA 4 MG Oral Capsule Extended Release 24 Hour; take 1 capsule daily; Therapy: (94 31 11) to Recorded   6  Levothyroxine Sodium 25 MCG Oral Tablet; TAKE 1 TABLET BY MOUTH DAILY; Therapy: (Recorded:31Mar2017) to Recorded   7  Lipitor 80 MG Oral Tablet; Take 1 tablet daily Recorded   8  Sertraline HCl - 25 MG Oral Tablet; Therapy: (Recorded:06Jan2016) to Recorded    Allergies    1   No Known Drug Allergies    Vitals  Signs    Temperature: 96 6 F, Tympanic  Heart Rate: 63, R Radial  Respiration: 16  Systolic: 054, RUE, Sitting  Diastolic: 70, RUE, Sitting  Height: 5 ft 4 in  Weight: 157 lb   BMI Calculated: 26 95  BSA Calculated: 1 76  O2 Saturation: 95    Physical Exam  General Multi-System Exam [facility]:   Constitutional   General appearance: No acute distress, well appearing and well nourished  Musculoskeletal   Gait and station: Normal     Inspection/palpation of digits and nails: Normal without clubbing or cyanosis  Inspection/palpation of joints, bones, and muscles: Normal     Range of motion: Normal     Muscle strength/tone: Normal     Skin   Skin and subcutaneous tissue: Normal without rashes or lesions  healing incision s/p cyst removal to to the left of axilla  Palpation of skin and subcutaneous tissue: Normal turgor  Signatures   Electronically signed by :  Starla Valentine; Dec 27 2017  4:32PM EST                       (Author)    Electronically signed by : Al Wan DO; Bartolo  3 2018  4:08PM EST                       (Author)

## 2018-01-23 NOTE — PROGRESS NOTES
Chief Complaint  Chief Complaint Free Text Note Form: Pt here for pain under right arm  Active Problems    1  Acquired hypothyroidism (244 9) (E03 9)   2  Acute bronchitis (466 0) (J20 9)   3  Balance problem (781 99) (R26 89)   4  Dizziness (780 4) (R42)   5  Edema (782 3) (R60 9)   6  Encounter for routine gynecological examination (V72 31) (Z01 419)   7  Encounter for screening mammogram for malignant neoplasm of breast (V76 12)   (Z12 31)   8  Fatigue (780 79) (R53 83)   9  Gait disturbance (781 2) (R26 9)   10  Hyperlipidemia (272 4) (E78 5)   11  Hypertension (401 9) (I10)   12  Neurogenic bladder (596 54) (N31 9)   13  Osteopenia (733 90) (M85 80)   14  Osteoporosis (733 00) (M81 0)   15  Primary localized osteoarthritis of right hip (715 15) (M16 11)   16  Right hip pain (719 45) (M25 551)   17  Visit for screening mammogram (V76 12) (Z12 31)    Surgical History    1  History of Dilation And Curettage    Family History  Father    1  Family history of Heart Disease (V17 49)    Social History    · Being A Social Drinker   · Former smoker (V15 82) (F06 151)   · Moderate Exercising Less Than 3 Times A Week    Current Meds   1  Aspirin 81 MG TABS; Therapy: (Aracely Bachelor) to Recorded   2  Atorvastatin Calcium TABS Recorded   3  Benazepril HCl - 20 MG Oral Tablet; TAKE 1 TABLET DAILY AS DIRECTED; Therapy: (Recorded:[id number]) to Recorded   4  Calcium 1000 + D TABS; Therapy: (Fonnie Bachelor) to Recorded   5  Detrol LA 4 MG Oral Capsule Extended Release 24 Hour; take 1 capsule daily; Therapy: (Fonnie Bachelor) to Recorded   6  Levothyroxine Sodium 25 MCG Oral Tablet; TAKE 1 TABLET BY MOUTH DAILY; Therapy: (Recorded:31Mar2017) to Recorded   7  Lipitor 80 MG Oral Tablet; Take 1 tablet daily Recorded   8  Sertraline HCl - 25 MG Oral Tablet; Therapy: (Recorded:06Jan2016) to Recorded    Allergies    1   No Known Drug Allergies    Vitals  Signs    Heart Rate: 65, R Radial  Respiration: 16  Systolic: 397, LUE, Sitting  Diastolic: 60, LUE, Sitting  Weight: 158 lb 8 oz  BMI Calculated: 26 95  BSA Calculated: 1 78  O2 Saturation: 96    Signatures   Electronically signed by : Benito Pina DO; Dec  1 2017 12:34PM EST                       (Author)

## 2018-02-13 RX ORDER — ATORVASTATIN CALCIUM 80 MG/1
TABLET, FILM COATED ORAL
COMMUNITY
End: 2018-06-18

## 2018-02-13 RX ORDER — LEVOTHYROXINE SODIUM 0.03 MG/1
1 TABLET ORAL DAILY
COMMUNITY
End: 2018-06-18

## 2018-02-13 RX ORDER — TOLTERODINE 4 MG/1
1 CAPSULE, EXTENDED RELEASE ORAL DAILY
COMMUNITY
End: 2018-06-18

## 2018-02-13 RX ORDER — BENAZEPRIL HYDROCHLORIDE 20 MG/1
1 TABLET ORAL DAILY
COMMUNITY
End: 2018-06-18

## 2018-02-13 RX ORDER — SERTRALINE HYDROCHLORIDE 25 MG/1
TABLET, FILM COATED ORAL
COMMUNITY
End: 2018-04-13

## 2018-02-14 ENCOUNTER — OFFICE VISIT (OUTPATIENT)
Dept: GERIATRICS | Facility: OTHER | Age: 83
End: 2018-02-14
Payer: MEDICARE

## 2018-02-14 VITALS
DIASTOLIC BLOOD PRESSURE: 60 MMHG | SYSTOLIC BLOOD PRESSURE: 110 MMHG | WEIGHT: 154.25 LBS | TEMPERATURE: 98.9 F | BODY MASS INDEX: 26.48 KG/M2 | HEART RATE: 72 BPM | RESPIRATION RATE: 16 BRPM

## 2018-02-14 DIAGNOSIS — R10.84 GENERALIZED ABDOMINAL PAIN: ICD-10-CM

## 2018-02-14 DIAGNOSIS — N39.3 STRESS INCONTINENCE OF URINE: Primary | ICD-10-CM

## 2018-02-14 PROCEDURE — 99214 OFFICE O/P EST MOD 30 MIN: CPT | Performed by: INTERNAL MEDICINE

## 2018-02-14 RX ORDER — TOLTERODINE 4 MG/1
4 CAPSULE, EXTENDED RELEASE ORAL DAILY
Qty: 30 CAPSULE | Refills: 6 | Status: SHIPPED | OUTPATIENT
Start: 2018-02-14 | End: 2018-03-16 | Stop reason: ALTCHOICE

## 2018-02-14 NOTE — PROGRESS NOTES
Assessment/Plan:     Problem List Items Addressed This Visit     Generalized abdominal pain     Currently afebrile, mild discomfort  Did have a temperature as high as 101  No change in Bm, no n/v  Will order BMP, CBC, amylase, and lipase, UA c + S  Relevant Orders    CBC    Comprehensive metabolic panel    Amylase    Lipase    Urinalysis with reflex to microscopic    Urine culture    Stress incontinence of urine - Primary     Bothersome for patient  Does not want an Urology consult  Prefer to try medication  Will order detrol LA 4 mg PO daily  Also order UA C + S  Follow up 2-3 weeks  Relevant Medications    tolterodine (DETROL LA) 4 mg 24 hr capsule    Other Relevant Orders    Urinalysis with reflex to microscopic    Urine culture            Subjective: abdominal pain      Patient ID: Gio Muhammad is a 80 y o  female  Patient seen and examined  Complains of abdominal pain with fever for a few days  Also complains of urinary incontinence worse at night  Review of Systems   Constitutional: Negative  HENT: Negative  Eyes: Negative  Respiratory: Negative  Cardiovascular: Negative  Gastrointestinal: Positive for abdominal pain  Endocrine: Negative  Genitourinary:        Incontinence   Musculoskeletal: Negative  Skin: Negative  Allergic/Immunologic: Negative  Neurological: Negative  Hematological: Negative  Psychiatric/Behavioral: Negative  Objective:    Vitals:    02/14/18 1306   BP: 110/60   Pulse: 72   Resp: 16   Temp: 98 9 °F (37 2 °C)        Physical Exam   Constitutional: She is oriented to person, place, and time  She appears well-developed  HENT:   Head: Normocephalic and atraumatic  Right Ear: External ear normal    Left Ear: External ear normal    Nose: Nose normal    Mouth/Throat: Oropharynx is clear and moist    Eyes: Conjunctivae and EOM are normal  Pupils are equal, round, and reactive to light     Neck: Normal range of motion  Cardiovascular: Normal rate, regular rhythm, normal heart sounds and intact distal pulses  Pulmonary/Chest: Effort normal and breath sounds normal    Abdominal: Soft  Bowel sounds are normal    Musculoskeletal: Normal range of motion  Neurological: She is alert and oriented to person, place, and time  Skin: Skin is warm and dry  Psychiatric: She has a normal mood and affect  Her behavior is normal      Time spent 25 minutes with 15 minutes to coordination of care/plan of care/counseling with patient and staff

## 2018-02-14 NOTE — ASSESSMENT & PLAN NOTE
Bothersome for patient  Does not want an Urology consult  Prefer to try medication  Will order detrol LA 4 mg PO daily  Also order UA C + S  Follow up 2-3 weeks

## 2018-02-14 NOTE — ASSESSMENT & PLAN NOTE
Currently afebrile, mild discomfort  Did have a temperature as high as 101  No change in Bm, no n/v    Will order BMP, CBC, amylase, and lipase, UA c + S

## 2018-03-05 ENCOUNTER — OFFICE VISIT (OUTPATIENT)
Dept: GERIATRICS | Facility: OTHER | Age: 83
End: 2018-03-05
Payer: MEDICARE

## 2018-03-05 VITALS
WEIGHT: 156 LBS | TEMPERATURE: 97.9 F | BODY MASS INDEX: 26.78 KG/M2 | RESPIRATION RATE: 16 BRPM | HEART RATE: 72 BPM | SYSTOLIC BLOOD PRESSURE: 160 MMHG | DIASTOLIC BLOOD PRESSURE: 80 MMHG

## 2018-03-05 DIAGNOSIS — I10 ESSENTIAL HYPERTENSION: Primary | ICD-10-CM

## 2018-03-05 PROCEDURE — 99214 OFFICE O/P EST MOD 30 MIN: CPT | Performed by: INTERNAL MEDICINE

## 2018-03-05 NOTE — PROGRESS NOTES
Assessment/Plan:    Essential hypertension  Currently symptomatic with elevated BP and feeling "dizzy" this am   Instructed patient to increase Benzapril from 10 mg to one full tab at 20mg  Patient will have her BP checked in wellness office a couple times a week  F/U in one month  Subjective: dizzy     Patient ID: Anita Gonzalez is a 80 y o  female  Patient seen and examined  C/O feeling dizzy and light headed this am prior to taking meds  Denies LOC, N/V        Review of Systems   Constitutional: Negative  HENT: Negative  Eyes: Negative  Respiratory: Negative  Cardiovascular: Positive for leg swelling  Palpitations: left ankle trace edema  Gastrointestinal: Negative  Endocrine: Negative  Genitourinary: Negative  Musculoskeletal: Negative  Skin: Negative  Allergic/Immunologic: Negative  Neurological: Positive for dizziness  Hematological: Negative  Psychiatric/Behavioral: Negative  Objective:      /80 (BP Location: Left arm, Patient Position: Sitting, Cuff Size: Standard)   Pulse 72   Temp 97 9 °F (36 6 °C) (Tympanic)   Resp 16   Wt 70 8 kg (156 lb)   BMI 26 78 kg/m²          Physical Exam   Constitutional: She is oriented to person, place, and time  She appears well-developed  HENT:   Head: Normocephalic and atraumatic  Right Ear: External ear normal    Left Ear: External ear normal    Nose: Nose normal    Mouth/Throat: Oropharynx is clear and moist    Eyes: Conjunctivae and EOM are normal  Pupils are equal, round, and reactive to light  Neck: Normal range of motion  Cardiovascular: Normal rate, regular rhythm, normal heart sounds and intact distal pulses  Pulmonary/Chest: Effort normal and breath sounds normal    Abdominal: Soft  Bowel sounds are normal    Musculoskeletal: Normal range of motion  She exhibits edema (trace left ankle)  Neurological: She is alert and oriented to person, place, and time     Skin: Skin is warm and dry    Psychiatric: She has a normal mood and affect  Her behavior is normal      Time spent 25 minutes with 15 minutes to coordination of care/plan of care/counseling with patient and staff

## 2018-03-05 NOTE — ASSESSMENT & PLAN NOTE
Currently symptomatic with elevated BP and feeling "dizzy" this am   Instructed patient to increase Benzapril from 10 mg to one full tab at 20mg  Patient will have her BP checked in wellness office a couple times a week  F/U in one month

## 2018-04-05 RX ORDER — MELATONIN
1000 DAILY
COMMUNITY

## 2018-04-09 ENCOUNTER — OFFICE VISIT (OUTPATIENT)
Dept: GERIATRICS | Facility: OTHER | Age: 83
End: 2018-04-09
Payer: MEDICARE

## 2018-04-09 VITALS
DIASTOLIC BLOOD PRESSURE: 70 MMHG | WEIGHT: 159.25 LBS | SYSTOLIC BLOOD PRESSURE: 158 MMHG | TEMPERATURE: 98.2 F | HEART RATE: 65 BPM | BODY MASS INDEX: 27.34 KG/M2 | RESPIRATION RATE: 16 BRPM

## 2018-04-09 DIAGNOSIS — I71.4 AAA (ABDOMINAL AORTIC ANEURYSM) WITHOUT RUPTURE (HCC): ICD-10-CM

## 2018-04-09 DIAGNOSIS — F32.A DEPRESSION, UNSPECIFIED DEPRESSION TYPE: ICD-10-CM

## 2018-04-09 DIAGNOSIS — I10 ESSENTIAL HYPERTENSION: Primary | ICD-10-CM

## 2018-04-09 PROBLEM — I71.40 AAA (ABDOMINAL AORTIC ANEURYSM) WITHOUT RUPTURE: Status: ACTIVE | Noted: 2018-04-09

## 2018-04-09 PROCEDURE — 99214 OFFICE O/P EST MOD 30 MIN: CPT | Performed by: INTERNAL MEDICINE

## 2018-04-09 NOTE — PROGRESS NOTES
Assessment/Plan:    AAA (abdominal aortic aneurysm) without rupture (HCC)  Asymptomatic  Needs surveillance  Will order abdominal u/s      Essential hypertension  BP is still elevated  Will rx amlodipine 2 5 mg PO Daily     Depression  Admits to feeling down  Nonsuicidal   Is on sertraline 25 mg p  o  daily which is relatively low dose  Will increase to 50 mg p o  daily  Monitor sodium level  We will see the patient in 1 month or sooner if needed in between then  Subjective: f/u BP     Patient ID: Wen Proctor is a 80 y o  female  Patient seen examined follow-up  Blood pressure remains slightly elevated despite on double dose of benazepril  Patient also states it feels down  Has not gotten out of her apartment all winter  On low dose SSRI  Review of Systems   Constitutional: Negative  HENT: Negative  Eyes: Negative  Respiratory: Negative  Cardiovascular: Negative  Gastrointestinal: Negative  Endocrine: Negative  Genitourinary: Negative  Musculoskeletal: Negative  Skin: Negative  Allergic/Immunologic: Negative  Neurological: Negative  Hematological: Negative  Psychiatric/Behavioral: Positive for decreased concentration  Objective:      /70 (BP Location: Left arm, Patient Position: Sitting, Cuff Size: Standard)   Pulse 65   Temp 98 2 °F (36 8 °C) (Tympanic)   Resp 16   Wt 72 2 kg (159 lb 4 oz)   BMI 27 34 kg/m²          Physical Exam   Constitutional: She is oriented to person, place, and time  She appears well-developed  HENT:   Head: Normocephalic and atraumatic  Right Ear: External ear normal    Left Ear: External ear normal    Nose: Nose normal    Mouth/Throat: Oropharynx is clear and moist    Eyes: Conjunctivae and EOM are normal  Pupils are equal, round, and reactive to light  Neck: Normal range of motion  Cardiovascular: Normal rate, regular rhythm, normal heart sounds and intact distal pulses  Pulmonary/Chest: Effort normal and breath sounds normal    Abdominal: Soft  Bowel sounds are normal    Musculoskeletal: Normal range of motion  Neurological: She is alert and oriented to person, place, and time  Skin: Skin is warm and dry  Psychiatric: She has a normal mood and affect  Her behavior is normal        Time spent 30 minutes with 20 minutes to coordination of care/plan of care/counseling with patient/staff

## 2018-04-09 NOTE — ASSESSMENT & PLAN NOTE
Admits to feeling down  Nonsuicidal   Is on sertraline 25 mg p  o  daily which is relatively low dose  Will increase to 50 mg p o  daily  Monitor sodium level  We will see the patient in 1 month or sooner if needed in between then

## 2018-04-13 DIAGNOSIS — F32.89 OTHER DEPRESSION: ICD-10-CM

## 2018-04-13 DIAGNOSIS — I10 ESSENTIAL HYPERTENSION: ICD-10-CM

## 2018-04-13 DIAGNOSIS — F32.89 OTHER DEPRESSION: Primary | ICD-10-CM

## 2018-04-13 RX ORDER — AMLODIPINE BESYLATE 5 MG/1
5 TABLET ORAL DAILY
Qty: 30 TABLET | Refills: 6 | Status: SHIPPED | OUTPATIENT
Start: 2018-04-13 | End: 2018-04-13 | Stop reason: SDUPTHER

## 2018-04-13 RX ORDER — AMLODIPINE BESYLATE 5 MG/1
5 TABLET ORAL DAILY
Qty: 30 TABLET | Refills: 6 | Status: SHIPPED | OUTPATIENT
Start: 2018-04-13 | End: 2018-07-02

## 2018-05-09 ENCOUNTER — OFFICE VISIT (OUTPATIENT)
Dept: GERIATRICS | Facility: OTHER | Age: 83
End: 2018-05-09
Payer: MEDICARE

## 2018-05-09 VITALS
SYSTOLIC BLOOD PRESSURE: 130 MMHG | DIASTOLIC BLOOD PRESSURE: 60 MMHG | BODY MASS INDEX: 26.31 KG/M2 | HEART RATE: 64 BPM | RESPIRATION RATE: 14 BRPM | TEMPERATURE: 97.2 F | WEIGHT: 153.25 LBS

## 2018-05-09 DIAGNOSIS — I71.4 AAA (ABDOMINAL AORTIC ANEURYSM) WITHOUT RUPTURE (HCC): Primary | ICD-10-CM

## 2018-05-09 DIAGNOSIS — F32.89 OTHER DEPRESSION: ICD-10-CM

## 2018-05-09 DIAGNOSIS — E78.49 OTHER HYPERLIPIDEMIA: ICD-10-CM

## 2018-05-09 PROCEDURE — 99214 OFFICE O/P EST MOD 30 MIN: CPT | Performed by: INTERNAL MEDICINE

## 2018-05-09 NOTE — ASSESSMENT & PLAN NOTE
CurrentlyOn 80 mg of Lipitor which I believe is too high for this patient  Recommend kind half to 40 mg  Will monitor lipid level    Goal LDL is less than 130

## 2018-05-09 NOTE — PROGRESS NOTES
Assessment/Plan:    AAA (abdominal aortic aneurysm) without rupture (HCC)  Recent abdominal ultrasound showed aneurysm to be 2 6 cm  Reviewed with patient  Continue to reduce secondary risk factors  Surveillance Q year  Essential hypertension  Hypertension - Blood pressure controlled with current medications  Recommend continue current medications  Monitor electrolytes and renal function  Depression  Overall mood is improved  Has had approximately 3 weeks of Zoloft at 50 mg p o  daily will continue this and I will recheck patient in 6 weeks    Other hyperlipidemia  CurrentlyOn 80 mg of Lipitor which I believe is too high for this patient  Recommend kind half to 40 mg  Will monitor lipid level  Goal LDL is less than 130             Subjective: f/u     Patient ID: Adryan Bird is a 80 y o  female  Patient seen and examined  Personally she states she has been on thinks her mood is any worse or any better  Approximally on Zoloft 50 mg for 3 weeks at this point  Denies suicidal ideation  Review of Systems   Constitutional: Negative  HENT: Negative  Eyes: Negative  Respiratory: Negative  Cardiovascular: Negative  Gastrointestinal: Negative  Endocrine: Negative  Genitourinary: Negative  Musculoskeletal: Negative  Skin: Negative  Allergic/Immunologic: Negative  Neurological: Negative  Hematological: Negative  Psychiatric/Behavioral: Negative  Objective:      /60 (BP Location: Left arm, Patient Position: Sitting, Cuff Size: Standard)   Pulse 64   Temp (!) 97 2 °F (36 2 °C) (Tympanic)   Resp 14   Wt 69 5 kg (153 lb 4 oz)   BMI 26 31 kg/m²          Physical Exam   Constitutional: She is oriented to person, place, and time  She appears well-developed  HENT:   Head: Normocephalic and atraumatic     Right Ear: External ear normal    Left Ear: External ear normal    Nose: Nose normal    Mouth/Throat: Oropharynx is clear and moist    Eyes: Conjunctivae and EOM are normal  Pupils are equal, round, and reactive to light  Neck: Normal range of motion  Cardiovascular: Normal rate, regular rhythm, normal heart sounds and intact distal pulses  Pulmonary/Chest: Effort normal and breath sounds normal    Abdominal: Soft  Bowel sounds are normal    Musculoskeletal: Normal range of motion  Arthritic changes   Neurological: She is alert and oriented to person, place, and time  Skin: Skin is warm and dry  Chronic changes   Psychiatric: She has a normal mood and affect  Her behavior is normal      Time spent 30 minutes with 20 minutes to coordination of care/plan of care/counseling with patient/staff

## 2018-05-09 NOTE — ASSESSMENT & PLAN NOTE
Recent abdominal ultrasound showed aneurysm to be 2 6 cm  Reviewed with patient  Continue to reduce secondary risk factors  Surveillance Q year

## 2018-05-09 NOTE — ASSESSMENT & PLAN NOTE
Overall mood is improved    Has had approximately 3 weeks of Zoloft at 50 mg p o  daily will continue this and I will recheck patient in 6 weeks

## 2018-06-18 ENCOUNTER — OFFICE VISIT (OUTPATIENT)
Dept: GERIATRICS | Facility: OTHER | Age: 83
End: 2018-06-18
Payer: MEDICARE

## 2018-06-18 VITALS
RESPIRATION RATE: 14 BRPM | WEIGHT: 158.38 LBS | TEMPERATURE: 98.2 F | BODY MASS INDEX: 27.19 KG/M2 | HEART RATE: 60 BPM | DIASTOLIC BLOOD PRESSURE: 70 MMHG | SYSTOLIC BLOOD PRESSURE: 133 MMHG

## 2018-06-18 DIAGNOSIS — I10 ESSENTIAL HYPERTENSION: ICD-10-CM

## 2018-06-18 DIAGNOSIS — F32.A DEPRESSION, UNSPECIFIED DEPRESSION TYPE: Primary | ICD-10-CM

## 2018-06-18 DIAGNOSIS — F32.89 OTHER DEPRESSION: Primary | ICD-10-CM

## 2018-06-18 DIAGNOSIS — E03.9 ACQUIRED HYPOTHYROIDISM: ICD-10-CM

## 2018-06-18 PROCEDURE — 99213 OFFICE O/P EST LOW 20 MIN: CPT | Performed by: INTERNAL MEDICINE

## 2018-06-18 RX ORDER — LEVOFLOXACIN 250 MG/1
TABLET ORAL
COMMUNITY
End: 2018-12-11 | Stop reason: ALTCHOICE

## 2018-06-18 RX ORDER — TRAMADOL HYDROCHLORIDE 50 MG/1
TABLET ORAL
COMMUNITY
End: 2018-07-02

## 2018-06-18 RX ORDER — CEPHALEXIN 500 MG/1
CAPSULE ORAL
COMMUNITY
End: 2018-12-11 | Stop reason: ALTCHOICE

## 2018-06-18 RX ORDER — BENAZEPRIL HYDROCHLORIDE 20 MG/1
20 TABLET ORAL DAILY
Qty: 90 TABLET | Refills: 3 | Status: SHIPPED | OUTPATIENT
Start: 2018-06-18 | End: 2018-08-08 | Stop reason: SDUPTHER

## 2018-06-18 RX ORDER — ATORVASTATIN CALCIUM 80 MG/1
TABLET, FILM COATED ORAL
COMMUNITY
End: 2018-08-08 | Stop reason: DRUGHIGH

## 2018-06-18 RX ORDER — LEVOTHYROXINE SODIUM 0.03 MG/1
25 TABLET ORAL DAILY
Qty: 90 TABLET | Refills: 3 | Status: SHIPPED | OUTPATIENT
Start: 2018-06-18 | End: 2019-03-05 | Stop reason: SDUPTHER

## 2018-06-18 RX ORDER — MECLIZINE HYDROCHLORIDE 25 MG/1
TABLET ORAL
COMMUNITY
End: 2018-07-02

## 2018-06-18 RX ORDER — BENZONATATE 100 MG/1
CAPSULE ORAL
COMMUNITY
End: 2018-12-11 | Stop reason: ALTCHOICE

## 2018-06-18 RX ORDER — TOLTERODINE 4 MG/1
CAPSULE, EXTENDED RELEASE ORAL
COMMUNITY
End: 2018-08-08 | Stop reason: SDUPTHER

## 2018-06-18 RX ORDER — LEVOTHYROXINE SODIUM 0.03 MG/1
TABLET ORAL
COMMUNITY
End: 2018-06-18 | Stop reason: SDUPTHER

## 2018-06-18 RX ORDER — BENAZEPRIL HYDROCHLORIDE 20 MG/1
TABLET ORAL
COMMUNITY
End: 2018-06-18 | Stop reason: SDUPTHER

## 2018-06-18 RX ORDER — SERTRALINE HYDROCHLORIDE 25 MG/1
TABLET, FILM COATED ORAL
COMMUNITY
End: 2018-06-18 | Stop reason: SDUPTHER

## 2018-06-18 RX ORDER — SERTRALINE HYDROCHLORIDE 25 MG/1
50 TABLET, FILM COATED ORAL DAILY
Qty: 180 TABLET | Refills: 3 | Status: SHIPPED | OUTPATIENT
Start: 2018-06-18 | End: 2018-06-25

## 2018-06-18 NOTE — ASSESSMENT & PLAN NOTE
Follow-up today after increase of sertraline to 50 milligrams  Mood is very good  Nonsuicidal   Patient is smiling in the office today  Will routinely monitor sodium  Continue current medications no change

## 2018-06-18 NOTE — PROGRESS NOTES
Assessment/Plan:    Depression  Follow-up today after increase of sertraline to 50 milligrams  Mood is very good  Nonsuicidal   Patient is smiling in the office today  Will routinely monitor sodium  Continue current medications no change  Subjective: f/u     Patient ID: Santosh Monk is a 80 y o  female  Patient seen examined  Overall mood is quite improved  Currently taking Zoloft 50 milligrams p o  once daily  Blood pressure is well controlled as well with current change in medications  Review of Systems   Constitutional: Negative  HENT: Negative  Eyes: Negative  Respiratory: Negative  Cardiovascular: Negative  Gastrointestinal: Negative  Endocrine: Negative  Genitourinary: Negative  Musculoskeletal: Negative  Skin: Negative  Allergic/Immunologic: Negative  Neurological: Negative  Hematological: Negative  Psychiatric/Behavioral: Negative  Objective:      /70 (BP Location: Left arm, Patient Position: Sitting, Cuff Size: Standard)   Pulse 60   Temp 98 2 °F (36 8 °C) (Tympanic)   Resp 14   Wt 71 8 kg (158 lb 6 oz)   BMI 27 19 kg/m²          Physical Exam   Constitutional: She is oriented to person, place, and time  She appears well-developed  HENT:   Head: Normocephalic and atraumatic  Right Ear: External ear normal    Left Ear: External ear normal    Nose: Nose normal    Mouth/Throat: Oropharynx is clear and moist    Eyes: Conjunctivae and EOM are normal  Pupils are equal, round, and reactive to light  Neck: Normal range of motion  Cardiovascular: Normal rate, regular rhythm, normal heart sounds and intact distal pulses  Pulmonary/Chest: Effort normal and breath sounds normal    Abdominal: Soft  Bowel sounds are normal    Musculoskeletal: Normal range of motion  Neurological: She is alert and oriented to person, place, and time  Skin: Skin is warm and dry  Psychiatric: She has a normal mood and affect   Her behavior is normal      Time spent 15 minutes with 10 minutes to coordination of care/plan of care/counseling with patient/family/staff/nursing

## 2018-06-20 DIAGNOSIS — I10 HYPERTENSION, UNSPECIFIED TYPE: Primary | ICD-10-CM

## 2018-06-20 DIAGNOSIS — F32.A DEPRESSION, UNSPECIFIED DEPRESSION TYPE: ICD-10-CM

## 2018-06-20 RX ORDER — AMLODIPINE BESYLATE 5 MG/1
5 TABLET ORAL DAILY
Qty: 90 TABLET | Refills: 0 | Status: SHIPPED | OUTPATIENT
Start: 2018-06-20 | End: 2020-09-04

## 2018-06-25 DIAGNOSIS — F32.A DEPRESSION, UNSPECIFIED DEPRESSION TYPE: ICD-10-CM

## 2018-07-02 ENCOUNTER — OFFICE VISIT (OUTPATIENT)
Dept: OBGYN CLINIC | Facility: CLINIC | Age: 83
End: 2018-07-02
Payer: MEDICARE

## 2018-07-02 VITALS
DIASTOLIC BLOOD PRESSURE: 72 MMHG | BODY MASS INDEX: 26.46 KG/M2 | HEART RATE: 75 BPM | SYSTOLIC BLOOD PRESSURE: 145 MMHG | WEIGHT: 155 LBS | HEIGHT: 64 IN

## 2018-07-02 DIAGNOSIS — M25.551 RIGHT HIP PAIN: Primary | ICD-10-CM

## 2018-07-02 DIAGNOSIS — M76.31 IT BAND SYNDROME, RIGHT: ICD-10-CM

## 2018-07-02 DIAGNOSIS — M70.61 GREATER TROCHANTERIC BURSITIS OF RIGHT HIP: ICD-10-CM

## 2018-07-02 PROCEDURE — 99214 OFFICE O/P EST MOD 30 MIN: CPT | Performed by: ORTHOPAEDIC SURGERY

## 2018-07-02 PROCEDURE — 20610 DRAIN/INJ JOINT/BURSA W/O US: CPT | Performed by: ORTHOPAEDIC SURGERY

## 2018-07-02 RX ORDER — BUPIVACAINE HYDROCHLORIDE 2.5 MG/ML
1 INJECTION, SOLUTION INFILTRATION; PERINEURAL
Status: COMPLETED | OUTPATIENT
Start: 2018-07-02 | End: 2018-07-02

## 2018-07-02 RX ORDER — BETAMETHASONE SODIUM PHOSPHATE AND BETAMETHASONE ACETATE 3; 3 MG/ML; MG/ML
12 INJECTION, SUSPENSION INTRA-ARTICULAR; INTRALESIONAL; INTRAMUSCULAR; SOFT TISSUE
Status: COMPLETED | OUTPATIENT
Start: 2018-07-02 | End: 2018-07-02

## 2018-07-02 RX ORDER — MELOXICAM 7.5 MG/1
7.5 TABLET ORAL DAILY
Qty: 30 TABLET | Refills: 1 | Status: SHIPPED | OUTPATIENT
Start: 2018-07-02 | End: 2018-12-11 | Stop reason: ALTCHOICE

## 2018-07-02 RX ORDER — LIDOCAINE HYDROCHLORIDE 10 MG/ML
1 INJECTION, SOLUTION INFILTRATION; PERINEURAL
Status: COMPLETED | OUTPATIENT
Start: 2018-07-02 | End: 2018-07-02

## 2018-07-02 RX ADMIN — BUPIVACAINE HYDROCHLORIDE 1 ML: 2.5 INJECTION, SOLUTION INFILTRATION; PERINEURAL at 12:21

## 2018-07-02 RX ADMIN — LIDOCAINE HYDROCHLORIDE 1 ML: 10 INJECTION, SOLUTION INFILTRATION; PERINEURAL at 12:21

## 2018-07-02 RX ADMIN — BETAMETHASONE SODIUM PHOSPHATE AND BETAMETHASONE ACETATE 12 MG: 3; 3 INJECTION, SUSPENSION INTRA-ARTICULAR; INTRALESIONAL; INTRAMUSCULAR; SOFT TISSUE at 12:21

## 2018-07-02 NOTE — PROGRESS NOTES
Assessment/Plan:    Diagnoses and all orders for this visit:    Right hip pain  -     Ambulatory referral to Physical Therapy; Future  -     Large joint arthrocentesis  -     meloxicam (MOBIC) 7 5 mg tablet; Take 1 tablet (7 5 mg total) by mouth daily    Greater trochanteric bursitis of right hip  -     Ambulatory referral to Physical Therapy; Future  -     Large joint arthrocentesis  -     meloxicam (MOBIC) 7 5 mg tablet; Take 1 tablet (7 5 mg total) by mouth daily    It band syndrome, right  -     Ambulatory referral to Physical Therapy; Future  -     meloxicam (MOBIC) 7 5 mg tablet; Take 1 tablet (7 5 mg total) by mouth daily      1  Right hip pain  Ambulatory referral to Physical Therapy    Large joint arthrocentesis    meloxicam (MOBIC) 7 5 mg tablet   2  Greater trochanteric bursitis of right hip  Ambulatory referral to Physical Therapy    Large joint arthrocentesis    meloxicam (MOBIC) 7 5 mg tablet   3  It band syndrome, right  Ambulatory referral to Physical Therapy    meloxicam (MOBIC) 7 5 mg tablet        80year-old female with right hip greater trochanter bursitis and IT band syndrome  Although the patient does have radiographic evidence of  Severe right hip OA, her exam and symptoms are not consistent with the hip joint proper been source for pain  Patient did receive a corticosteroid injection of the right greater trochanteric bursa, which she tolerated well no immediate complication  Explained to the patient and noted to have sustained pain relief, she will need to attend physical therapy and reports that she will do this at the assisted facility which she resides  Also recommended the patient discussed with her caregivers at the  [facility] facility her recent falls and appropriate measures to prevent any additional falls, including re-evaluation of her assistive devices there that facility    She will follow up in 6-8 weeks for re-evaluation and call sooner if she has any other concerns  Subjective:   Patient ID: Durrell Runner is a 80 y o  female   HPI     ED 9year-old female with history of right hip osteoarthritis presents the office today with right hip pain  She was last seen in the office about 2 and half years ago, at this time she declined any treatment including cortisone injections  She reports that the right hip is not really bothered too much last 2 and half years, however started hurting more last week after a fall on the right hip  She says she was standing  Against her bed when she lost her balance and fell  She was not using an assistive device at that time, however dose reports she has a walker and Rollator at the [facility] home where she resides  She reports the pain as a sharp constant pain, moderate nature, worse with standing and weight-bearing, better with rest and ibuprofen  She has not tried any recent physical therapy  She  Reports other falls last him to half years but denies that these resulted in any fractures or other significant injuries  She denies any numbness or tingling  The following portions of the patient's history were reviewed and updated as appropriate: allergies, current medications, past family history, past social history, past surgical history and problem list     Review of Systems    Comprehensive review of systems negative except as noted in HPI    Objective:  Ortho Exam    right hip:   Full range of motion with flexion, extension  No pain with internal external rotation of the hip  Internal rotation limited to about 15   1+ pitting edema bilateral legs  Tenderness palpation over lateral hip over greater trochanter as well as over ITB band  And over Gerdy's tubercle  Negative log roll test   Negative Lisa's  Physical Exam  Physical Exam   Constitutional: She is oriented to person, place, and time  She appears well-developed and well-nourished       Patient presents using a rolling walker   HENT:   Head: Normocephalic and atraumatic  Eyes: Conjunctivae are normal  No scleral icterus  Neck: Normal range of motion  Neck supple  Pulmonary/Chest: Effort normal  No stridor  No respiratory distress  Neurological: She is alert and oriented to person, place, and time  No sensory deficit  Skin: Skin is warm and dry  No erythema  Psychiatric: She has a normal mood and affect  Her behavior is normal        I have personally reviewed pertinent films in PACS and my interpretation is  severe right hip osteoarthritis with significant osteophyte formation, joint space narrowing, subchondral sclerosis        Large joint arthrocentesis  Date/Time: 7/2/2018 12:21 PM  Consent given by: patient  Timeout: Immediately prior to procedure a time out was called to verify the correct patient, procedure, equipment, support staff and site/side marked as required   Supporting Documentation  Indications: pain   Procedure Details  Location: hip - R greater trochanteric bursa  Preparation: Patient was prepped and draped in the usual sterile fashion  Needle size: 22 G  Medications administered: 1 mL bupivacaine 0 25 %; 1 mL lidocaine 1 %; 12 mg betamethasone acetate-betamethasone sodium phosphate 6 (3-3) mg/mL    Patient tolerance: patient tolerated the procedure well with no immediate complications  Dressing:  Sterile dressing applied

## 2018-08-08 DIAGNOSIS — E78.5 HYPERLIPIDEMIA, UNSPECIFIED HYPERLIPIDEMIA TYPE: ICD-10-CM

## 2018-08-08 DIAGNOSIS — I10 ESSENTIAL HYPERTENSION: ICD-10-CM

## 2018-08-08 DIAGNOSIS — N32.81 OVERACTIVE BLADDER: ICD-10-CM

## 2018-08-08 DIAGNOSIS — E78.5 HYPERLIPIDEMIA, UNSPECIFIED HYPERLIPIDEMIA TYPE: Primary | ICD-10-CM

## 2018-08-08 RX ORDER — BENAZEPRIL HYDROCHLORIDE 20 MG/1
20 TABLET ORAL DAILY
Qty: 90 TABLET | Refills: 0 | Status: SHIPPED | OUTPATIENT
Start: 2018-08-08 | End: 2019-06-24 | Stop reason: SDUPTHER

## 2018-08-08 RX ORDER — TOLTERODINE 4 MG/1
4 CAPSULE, EXTENDED RELEASE ORAL DAILY
Qty: 90 CAPSULE | Refills: 0 | Status: SHIPPED | OUTPATIENT
Start: 2018-08-08 | End: 2018-10-15 | Stop reason: SDUPTHER

## 2018-08-08 RX ORDER — ATORVASTATIN CALCIUM 40 MG/1
40 TABLET, FILM COATED ORAL DAILY
Qty: 90 TABLET | Refills: 0 | Status: SHIPPED | OUTPATIENT
Start: 2018-08-08 | End: 2018-10-15 | Stop reason: SDUPTHER

## 2018-08-08 RX ORDER — TOLTERODINE 4 MG/1
4 CAPSULE, EXTENDED RELEASE ORAL DAILY
Qty: 90 CAPSULE | Refills: 0 | Status: SHIPPED | OUTPATIENT
Start: 2018-08-08 | End: 2018-08-08 | Stop reason: SDUPTHER

## 2018-08-08 RX ORDER — ATORVASTATIN CALCIUM 40 MG/1
40 TABLET, FILM COATED ORAL DAILY
Qty: 90 TABLET | Refills: 0 | Status: SHIPPED | OUTPATIENT
Start: 2018-08-08 | End: 2018-08-08 | Stop reason: SDUPTHER

## 2018-08-08 RX ORDER — BENAZEPRIL HYDROCHLORIDE 20 MG/1
20 TABLET ORAL DAILY
Qty: 90 TABLET | Refills: 0 | Status: SHIPPED | OUTPATIENT
Start: 2018-08-08 | End: 2018-08-08 | Stop reason: SDUPTHER

## 2018-09-11 ENCOUNTER — OFFICE VISIT (OUTPATIENT)
Dept: GERIATRICS | Facility: OTHER | Age: 83
End: 2018-09-11
Payer: MEDICARE

## 2018-09-11 VITALS
RESPIRATION RATE: 16 BRPM | DIASTOLIC BLOOD PRESSURE: 62 MMHG | TEMPERATURE: 97.7 F | SYSTOLIC BLOOD PRESSURE: 118 MMHG | WEIGHT: 157.25 LBS | BODY MASS INDEX: 26.99 KG/M2 | HEART RATE: 67 BPM

## 2018-09-11 DIAGNOSIS — E78.5 HYPERLIPIDEMIA, UNSPECIFIED HYPERLIPIDEMIA TYPE: ICD-10-CM

## 2018-09-11 DIAGNOSIS — E78.49 OTHER HYPERLIPIDEMIA: ICD-10-CM

## 2018-09-11 DIAGNOSIS — F32.A DEPRESSION, UNSPECIFIED DEPRESSION TYPE: ICD-10-CM

## 2018-09-11 DIAGNOSIS — I10 ESSENTIAL HYPERTENSION: Primary | ICD-10-CM

## 2018-09-11 DIAGNOSIS — R26.2 AMBULATORY DYSFUNCTION: ICD-10-CM

## 2018-09-11 DIAGNOSIS — M25.472 EDEMA OF LEFT ANKLE: ICD-10-CM

## 2018-09-11 PROCEDURE — 99215 OFFICE O/P EST HI 40 MIN: CPT | Performed by: FAMILY MEDICINE

## 2018-09-11 NOTE — PROGRESS NOTES
Assessment/Plan:    Other hyperlipidemia  -Goal is <130   -Patient's lipitor was decreased at last visit from 80 to 40mg qhs, which I believe is appropriate   -Will check Lipid panel   -Follow up in 3 months  Edema of left ankle  -Chronic    -No hx of trauma   -No pain   -Patient will see Dr Sharon Nguyen of [address] [place] [address] Staff at the wellness center was contacted to ensure they address it  Essential hypertension  -Blood pressure well controlled  -Continue current medications   -Will order BMP and follow up in 3 months with results  Depression  -Tolerating Sertraline well  -Recently increased to 50mg po daily   -Mood stable  Ambulatory dysfunction  -Patient ambulates with a walker at baseline    -She reports 2 recent falls that she reports as mechanical, however she does admit to feeling like her balance is poor    -She admits to poor compliance with PT in the past  But is agreeable to another evaluation for her balance  Subjective:      Patient ID: Cary Jarrett is a 80 y o  female  HPI    Patient presents today for follow up on chronic conditions  She reports feeling well  States she is compliant with her medications  Denies any pain or shortness of breath  Patient admits to a sedentary lifestyle, she has had 2 falls in the past 6 months that she describes as mechanical and denies major injury  She had been doing Physical therapy for Hip pain/bursitis at some point but admits to non compliance  She complains of L ankle edema, does not recall any trauma or precipitating events  Denies pain or limited ROM  Tolerating diet well  Voiding without difficulty  Regular BMs  Review of Systems   Constitutional: Negative for activity change, appetite change, fatigue, fever and unexpected weight change  HENT: Negative for congestion, dental problem and trouble swallowing  Respiratory: Negative for apnea, chest tightness and shortness of breath  Cardiovascular: Negative for chest pain, palpitations and leg swelling  Gastrointestinal: Negative for abdominal distention, abdominal pain, constipation, diarrhea, nausea and vomiting  Genitourinary: Negative for difficulty urinating, dysuria and frequency  Musculoskeletal: Positive for gait problem  Negative for arthralgias and back pain  Neurological: Negative for dizziness, weakness, numbness and headaches  Psychiatric/Behavioral: Negative for agitation, behavioral problems and confusion  Objective:      /62 (BP Location: Left arm, Patient Position: Sitting, Cuff Size: Standard)   Pulse 67   Temp 97 7 °F (36 5 °C) (Tympanic)   Resp 16   Wt 71 3 kg (157 lb 4 oz) Comment: with shoes  BMI 26 99 kg/m²          Physical Exam   Constitutional: She is oriented to person, place, and time  She appears well-developed and well-nourished  No distress  HENT:   Head: Normocephalic and atraumatic  Nose: Nose normal    Mouth/Throat: Oropharynx is clear and moist  No oropharyngeal exudate  Eyes: EOM are normal  Pupils are equal, round, and reactive to light  Right eye exhibits no discharge  Left eye exhibits no discharge  Neck: Normal range of motion  Neck supple  No JVD present  No thyromegaly present  Cardiovascular: Normal rate, regular rhythm and normal heart sounds  Exam reveals no gallop and no friction rub  No murmur heard  Pulmonary/Chest: Effort normal and breath sounds normal  No respiratory distress  She has no wheezes  Abdominal: Soft  Bowel sounds are normal  She exhibits no distension  There is no tenderness  There is no rebound  Musculoskeletal: Normal range of motion  She exhibits no edema or deformity  Lymphadenopathy:     She has no cervical adenopathy  Neurological: She is alert and oriented to person, place, and time  No cranial nerve deficit  Skin: Skin is warm and dry  She is not diaphoretic  Psychiatric: She has a normal mood and affect   Her behavior is normal  Judgment and thought content normal

## 2018-09-11 NOTE — ASSESSMENT & PLAN NOTE
-Blood pressure well controlled  -Continue current medications   -Will order BMP and follow up in 3 months with results

## 2018-09-11 NOTE — ASSESSMENT & PLAN NOTE
-Patient ambulates with a walker at baseline    -She reports 2 recent falls that she reports as mechanical, however she does admit to feeling like her balance is poor    -She admits to poor compliance with PT in the past  But is agreeable to another evaluation for her balance

## 2018-09-11 NOTE — ASSESSMENT & PLAN NOTE
-Chronic    -No hx of trauma   -No pain   -Patient will see Dr Brittany Doss of Podiatry tomorrow   -Contacted Staff at the wellness center was contacted to ensure they address it

## 2018-09-11 NOTE — ASSESSMENT & PLAN NOTE
-Goal is <130   -Patient's lipitor was decreased at last visit from 80 to 40mg qhs, which I believe is appropriate   -Will check Lipid panel   -Follow up in 3 months

## 2018-10-15 DIAGNOSIS — N32.81 OVERACTIVE BLADDER: ICD-10-CM

## 2018-10-15 DIAGNOSIS — E78.5 HYPERLIPIDEMIA, UNSPECIFIED HYPERLIPIDEMIA TYPE: ICD-10-CM

## 2018-10-16 RX ORDER — TOLTERODINE 4 MG/1
CAPSULE, EXTENDED RELEASE ORAL
Qty: 90 CAPSULE | Refills: 0 | Status: SHIPPED | OUTPATIENT
Start: 2018-10-16 | End: 2018-12-11

## 2018-10-16 RX ORDER — ATORVASTATIN CALCIUM 40 MG/1
TABLET, FILM COATED ORAL
Qty: 90 TABLET | Refills: 0 | Status: SHIPPED | OUTPATIENT
Start: 2018-10-16 | End: 2019-01-14 | Stop reason: SDUPTHER

## 2018-10-29 NOTE — PATIENT INSTRUCTIONS
She will follow up in about 6-8 weeks  Principal Discharge DX:	Facial pain  Secondary Diagnosis:	Status post fall

## 2018-12-11 ENCOUNTER — OFFICE VISIT (OUTPATIENT)
Dept: GERIATRICS | Facility: OTHER | Age: 83
End: 2018-12-11
Payer: MEDICARE

## 2018-12-11 VITALS
BODY MASS INDEX: 27.33 KG/M2 | WEIGHT: 159.2 LBS | HEART RATE: 71 BPM | DIASTOLIC BLOOD PRESSURE: 70 MMHG | OXYGEN SATURATION: 96 % | RESPIRATION RATE: 16 BRPM | SYSTOLIC BLOOD PRESSURE: 134 MMHG

## 2018-12-11 DIAGNOSIS — I10 ESSENTIAL HYPERTENSION: ICD-10-CM

## 2018-12-11 DIAGNOSIS — F32.A DEPRESSION, UNSPECIFIED DEPRESSION TYPE: ICD-10-CM

## 2018-12-11 DIAGNOSIS — N32.81 OVERACTIVE BLADDER: Primary | ICD-10-CM

## 2018-12-11 DIAGNOSIS — M25.472 EDEMA OF LEFT ANKLE: ICD-10-CM

## 2018-12-11 DIAGNOSIS — R26.2 AMBULATORY DYSFUNCTION: ICD-10-CM

## 2018-12-11 PROCEDURE — 99214 OFFICE O/P EST MOD 30 MIN: CPT | Performed by: FAMILY MEDICINE

## 2018-12-11 NOTE — PROGRESS NOTES
Assessment/Plan:    Overactive bladder  Patient currently on Detrol daily  She is tolerating it well  States she is having more incontinence overnight with frequent awakenings to void  Willing to try Myrbetriq  Willl stat 25mg po daily, lowest available dose  And follow up in 4 weeks  Depression  moood not great  She thinks she has stopped many activities, mostly because she is afraid of falling  She would be willing to increase dose of zoloft  I have recommended to return to PT to work on her fear of falling  She would like to wait and think about it  Will discuss at next visit  Ambulatory dysfunction  -Patient ambulates with a walker at baseline  She admits to poor compliance with home exercises  Denies any recent falls but admits to fear of falling which has made her drop many activities she used to enjoy, such as [employer]  Discussed possibility of resuming PT to work on fear of falling and balance, she declined at this time and wishes to wait to discuss at next visit  -Strongly encouraged to resume home exercises as prescribed by PT  Essential hypertension  -Blood pressure well controlled  -Continue current medications  -reviewed most recent BMP  Renal function electrolytes within normal limits  Diagnoses and all orders for this visit:    Overactive bladder  -     Mirabegron ER 25 MG TB24; Take 25 mg by mouth daily for 30 days    Edema of left ankle    Ambulatory dysfunction    Depression, unspecified depression type    Essential hypertension    Other orders  -     Calcium Carbonate-Vit D-Min (CALCIUM 1200 PO); Take by mouth Take 2 tablets daily          Subjective:      Patient ID: Heavenly Rosales is a 80 y o  female  Patient presents to follow up on chronic conditions  She reports feeling well  Denies any pain  She is currently ambulating with walker and denies any recent falls  She states her lower extremity edema has improved significantly    She is not currently wearing compression stockings but she is wearing regular stockings that seem to be thicker than average [facility] currently providing some compression  No recent changes in medications  Her only complaint today is urinary incontinence at night     States she wakes up about 2 - 3 times per night to go to void  She states when she gets out of bed urine usually just leaks into her pad/brief  She states during the day she is able to manage her symptoms without difficulty by not holding her urine too long  States she tries not to drink much prior to bedtime  The following portions of the patient's history were reviewed and updated as appropriate: allergies, current medications, past family history, past medical history, past social history, past surgical history and problem list     Review of Systems   Constitutional: Negative for activity change, appetite change, fatigue, fever and unexpected weight change  HENT: Negative for congestion, dental problem and trouble swallowing  Respiratory: Negative for apnea, chest tightness and shortness of breath  Cardiovascular: Negative for chest pain, palpitations and leg swelling  Gastrointestinal: Negative for abdominal distention, abdominal pain, constipation, diarrhea, nausea and vomiting  Genitourinary: Negative for difficulty urinating, dysuria and frequency  Musculoskeletal: Positive for gait problem  Negative for arthralgias and back pain  Neurological: Negative for dizziness, weakness, numbness and headaches  Psychiatric/Behavioral: Negative for agitation, behavioral problems and confusion  Objective:      /70 (BP Location: Right arm, Patient Position: Sitting, Cuff Size: Standard)   Pulse 71   Resp 16   Wt 72 2 kg (159 lb 3 2 oz)   SpO2 96%   BMI 27 33 kg/m²          Physical Exam   Constitutional: She is oriented to person, place, and time  She appears well-developed and well-nourished  No distress     HENT:   Head: Normocephalic and atraumatic  Nose: Nose normal    Mouth/Throat: Oropharynx is clear and moist  No oropharyngeal exudate  Eyes: Pupils are equal, round, and reactive to light  EOM are normal  Right eye exhibits no discharge  Left eye exhibits no discharge  Neck: Normal range of motion  Neck supple  No JVD present  No thyromegaly present  Cardiovascular: Normal rate, regular rhythm and normal heart sounds  Exam reveals no gallop and no friction rub  No murmur heard  Pulmonary/Chest: Effort normal and breath sounds normal  No respiratory distress  She has no wheezes  Abdominal: Soft  Bowel sounds are normal  She exhibits no distension  There is no tenderness  There is no rebound  Musculoskeletal: Normal range of motion  She exhibits no edema or deformity  Lymphadenopathy:     She has no cervical adenopathy  Neurological: She is alert and oriented to person, place, and time  No cranial nerve deficit  Skin: Skin is warm and dry  She is not diaphoretic  Psychiatric: She has a normal mood and affect  Her behavior is normal  Judgment and thought content normal    Vitals reviewed

## 2019-01-14 DIAGNOSIS — E78.5 HYPERLIPIDEMIA, UNSPECIFIED HYPERLIPIDEMIA TYPE: ICD-10-CM

## 2019-01-14 RX ORDER — ATORVASTATIN CALCIUM 40 MG/1
TABLET, FILM COATED ORAL
Qty: 90 TABLET | Refills: 0 | Status: SHIPPED | OUTPATIENT
Start: 2019-01-14 | End: 2019-04-15 | Stop reason: SDUPTHER

## 2019-01-15 ENCOUNTER — OFFICE VISIT (OUTPATIENT)
Dept: GERIATRICS | Facility: OTHER | Age: 84
End: 2019-01-15
Payer: MEDICARE

## 2019-01-15 VITALS
SYSTOLIC BLOOD PRESSURE: 144 MMHG | DIASTOLIC BLOOD PRESSURE: 70 MMHG | HEART RATE: 64 BPM | HEIGHT: 63 IN | TEMPERATURE: 97.8 F | RESPIRATION RATE: 16 BRPM | BODY MASS INDEX: 27.96 KG/M2 | WEIGHT: 157.8 LBS

## 2019-01-15 DIAGNOSIS — E03.9 ACQUIRED HYPOTHYROIDISM: ICD-10-CM

## 2019-01-15 DIAGNOSIS — Z13.820 SCREENING FOR OSTEOPOROSIS: ICD-10-CM

## 2019-01-15 DIAGNOSIS — R26.2 AMBULATORY DYSFUNCTION: ICD-10-CM

## 2019-01-15 DIAGNOSIS — I10 ESSENTIAL HYPERTENSION: Primary | ICD-10-CM

## 2019-01-15 DIAGNOSIS — N32.81 OVERACTIVE BLADDER: ICD-10-CM

## 2019-01-15 DIAGNOSIS — F32.A DEPRESSION, UNSPECIFIED DEPRESSION TYPE: ICD-10-CM

## 2019-01-15 PROBLEM — R10.84 GENERALIZED ABDOMINAL PAIN: Status: RESOLVED | Noted: 2018-02-14 | Resolved: 2019-01-15

## 2019-01-15 PROCEDURE — 99215 OFFICE O/P EST HI 40 MIN: CPT | Performed by: FAMILY MEDICINE

## 2019-01-15 NOTE — ASSESSMENT & PLAN NOTE
- Considering patients loss of height and age will screen patient for osteoporosis with dexa scan  - Will follow up on results and after review will consider medication if needed  - Patient advised to increase calcium/vitamin D and weight bearing exercise

## 2019-01-15 NOTE — ASSESSMENT & PLAN NOTE
- Mood is doing well after increase in zoloft     - Will continue  - Will obtain BMP to follow up on sodium and kidney function

## 2019-01-15 NOTE — ASSESSMENT & PLAN NOTE
-Patient ambulates with a walker at baseline  She admits to poor compliance with home exercises  Denies any recent falls but admits to fear of falling which has made her drop many activities she used to enjoy, such as [employer]  Discussed possibility of resuming PT to work on fear of falling and balance, she declined at this time and wishes to wait to discuss at next visit     -Strongly encouraged to resume home exercises as prescribed by PT

## 2019-01-15 NOTE — ASSESSMENT & PLAN NOTE
Patient currently on Myrbetriq daily  She is tolerating it well  Will increase to 50mg QD to achieve even better control  States she is having less incontinence overnight with frequent awakenings to void

## 2019-01-15 NOTE — ASSESSMENT & PLAN NOTE
-Blood pressure well controlled  -Continue current medications  -reviewed most recent BMP  Renal function electrolytes within normal limits

## 2019-01-31 ENCOUNTER — TELEPHONE (OUTPATIENT)
Dept: GERIATRICS | Facility: OTHER | Age: 84
End: 2019-01-31

## 2019-01-31 DIAGNOSIS — E03.9 HYPOTHYROIDISM, UNSPECIFIED TYPE: Primary | ICD-10-CM

## 2019-01-31 NOTE — TELEPHONE ENCOUNTER
Per Dr Taylor Briscoe verbal Pt notified that TSH BW done 01/22/19 was slightly elevated at 4 45  Patient was also told that the Provider would like BW again in 4wks  Order faxed to [id number] [facility] for f/u

## 2019-02-27 ENCOUNTER — HOSPITAL ENCOUNTER (OUTPATIENT)
Dept: RADIOLOGY | Age: 84
Discharge: HOME | End: 2019-02-27
Payer: MEDICARE

## 2019-02-27 DIAGNOSIS — Z13.820 SCREENING FOR OSTEOPOROSIS: ICD-10-CM

## 2019-02-27 PROCEDURE — 77080 DXA BONE DENSITY AXIAL: CPT

## 2019-03-05 DIAGNOSIS — F32.A DEPRESSION, UNSPECIFIED DEPRESSION TYPE: ICD-10-CM

## 2019-03-05 DIAGNOSIS — E03.9 ACQUIRED HYPOTHYROIDISM: ICD-10-CM

## 2019-03-05 DIAGNOSIS — E03.9 ACQUIRED HYPOTHYROIDISM: Primary | ICD-10-CM

## 2019-03-05 NOTE — TELEPHONE ENCOUNTER
Patient stated that her Sertraline was increase at last OV to 75mg and she would like to remain on the 75mg  She also stated that Thyroid level was not at goal(4 45) with last BW done 01/22   She wanted to know if there will be any changes to her meds since she also need a renewal  She had a repeat TSH 02/26 resulted at 5 14

## 2019-03-06 RX ORDER — LEVOTHYROXINE SODIUM 0.03 MG/1
25 TABLET ORAL DAILY
Qty: 90 TABLET | Refills: 3 | Status: SHIPPED | OUTPATIENT
Start: 2019-03-06 | End: 2019-04-23

## 2019-04-15 DIAGNOSIS — E78.5 HYPERLIPIDEMIA, UNSPECIFIED HYPERLIPIDEMIA TYPE: ICD-10-CM

## 2019-04-15 RX ORDER — ATORVASTATIN CALCIUM 40 MG/1
TABLET, FILM COATED ORAL
Qty: 90 TABLET | Refills: 0 | Status: SHIPPED | OUTPATIENT
Start: 2019-04-15 | End: 2019-06-24 | Stop reason: SDUPTHER

## 2019-04-23 ENCOUNTER — OFFICE VISIT (OUTPATIENT)
Dept: GERIATRICS | Facility: OTHER | Age: 84
End: 2019-04-23
Payer: MEDICARE

## 2019-04-23 VITALS
SYSTOLIC BLOOD PRESSURE: 128 MMHG | BODY MASS INDEX: 27.99 KG/M2 | HEART RATE: 66 BPM | OXYGEN SATURATION: 98 % | RESPIRATION RATE: 16 BRPM | DIASTOLIC BLOOD PRESSURE: 64 MMHG | WEIGHT: 158 LBS

## 2019-04-23 DIAGNOSIS — E03.9 ACQUIRED HYPOTHYROIDISM: ICD-10-CM

## 2019-04-23 DIAGNOSIS — E78.49 OTHER HYPERLIPIDEMIA: ICD-10-CM

## 2019-04-23 DIAGNOSIS — M25.551 RIGHT HIP PAIN: ICD-10-CM

## 2019-04-23 DIAGNOSIS — N32.81 OVERACTIVE BLADDER: ICD-10-CM

## 2019-04-23 DIAGNOSIS — I71.4 AAA (ABDOMINAL AORTIC ANEURYSM) WITHOUT RUPTURE (HCC): ICD-10-CM

## 2019-04-23 DIAGNOSIS — N39.3 STRESS INCONTINENCE OF URINE: ICD-10-CM

## 2019-04-23 DIAGNOSIS — R26.2 AMBULATORY DYSFUNCTION: ICD-10-CM

## 2019-04-23 DIAGNOSIS — Z13.820 SCREENING FOR OSTEOPOROSIS: Primary | ICD-10-CM

## 2019-04-23 DIAGNOSIS — I10 ESSENTIAL HYPERTENSION: ICD-10-CM

## 2019-04-23 DIAGNOSIS — F32.A DEPRESSION, UNSPECIFIED DEPRESSION TYPE: ICD-10-CM

## 2019-04-23 PROCEDURE — 99215 OFFICE O/P EST HI 40 MIN: CPT | Performed by: FAMILY MEDICINE

## 2019-04-23 RX ORDER — IBUPROFEN 200 MG
TABLET ORAL
COMMUNITY
End: 2019-06-04 | Stop reason: ALTCHOICE

## 2019-04-23 RX ORDER — LEVOTHYROXINE SODIUM 0.03 MG/1
37.5 TABLET ORAL DAILY
Qty: 90 TABLET | Refills: 3 | Status: SHIPPED | OUTPATIENT
Start: 2019-04-23 | End: 2019-07-16 | Stop reason: SDUPTHER

## 2019-06-04 ENCOUNTER — OFFICE VISIT (OUTPATIENT)
Dept: GERIATRICS | Facility: OTHER | Age: 84
End: 2019-06-04
Payer: MEDICARE

## 2019-06-04 VITALS
WEIGHT: 158.4 LBS | DIASTOLIC BLOOD PRESSURE: 76 MMHG | BODY MASS INDEX: 28.07 KG/M2 | HEIGHT: 63 IN | RESPIRATION RATE: 16 BRPM | OXYGEN SATURATION: 96 % | HEART RATE: 67 BPM | SYSTOLIC BLOOD PRESSURE: 144 MMHG

## 2019-06-04 DIAGNOSIS — R26.2 AMBULATORY DYSFUNCTION: ICD-10-CM

## 2019-06-04 DIAGNOSIS — E03.9 ACQUIRED HYPOTHYROIDISM: Primary | ICD-10-CM

## 2019-06-04 DIAGNOSIS — I10 ESSENTIAL HYPERTENSION: ICD-10-CM

## 2019-06-04 PROCEDURE — 99214 OFFICE O/P EST MOD 30 MIN: CPT | Performed by: FAMILY MEDICINE

## 2019-06-07 DIAGNOSIS — F32.A DEPRESSION, UNSPECIFIED DEPRESSION TYPE: ICD-10-CM

## 2019-06-24 DIAGNOSIS — E78.5 HYPERLIPIDEMIA, UNSPECIFIED HYPERLIPIDEMIA TYPE: ICD-10-CM

## 2019-06-24 DIAGNOSIS — N32.81 OVERACTIVE BLADDER: ICD-10-CM

## 2019-06-24 DIAGNOSIS — I10 ESSENTIAL HYPERTENSION: ICD-10-CM

## 2019-06-24 RX ORDER — ATORVASTATIN CALCIUM 40 MG/1
TABLET, FILM COATED ORAL
Qty: 90 TABLET | Refills: 0 | Status: SHIPPED | OUTPATIENT
Start: 2019-06-24 | End: 2019-09-23 | Stop reason: SDUPTHER

## 2019-06-26 RX ORDER — MIRABEGRON 50 MG/1
TABLET, FILM COATED, EXTENDED RELEASE ORAL
Qty: 90 TABLET | Refills: 1 | Status: SHIPPED | OUTPATIENT
Start: 2019-06-26 | End: 2019-10-10 | Stop reason: SDDI

## 2019-06-27 RX ORDER — BENAZEPRIL HYDROCHLORIDE 20 MG/1
TABLET ORAL
Qty: 90 TABLET | Refills: 3 | Status: SHIPPED | OUTPATIENT
Start: 2019-06-27 | End: 2020-06-04

## 2019-07-16 DIAGNOSIS — E03.9 ACQUIRED HYPOTHYROIDISM: ICD-10-CM

## 2019-07-17 RX ORDER — LEVOTHYROXINE SODIUM 0.03 MG/1
37.5 TABLET ORAL DAILY
Qty: 90 TABLET | Refills: 3 | Status: SHIPPED | OUTPATIENT
Start: 2019-07-17 | End: 2020-03-26 | Stop reason: SDUPTHER

## 2019-07-30 DIAGNOSIS — F32.A DEPRESSION, UNSPECIFIED DEPRESSION TYPE: ICD-10-CM

## 2019-07-30 NOTE — TELEPHONE ENCOUNTER
Patient stated that Dr Eitan Mondragon switch dosage of sertraline  to 1 1/2 but can't recall the date  Med renewal reflect dosage change

## 2019-09-23 DIAGNOSIS — E78.5 HYPERLIPIDEMIA, UNSPECIFIED HYPERLIPIDEMIA TYPE: ICD-10-CM

## 2019-09-23 RX ORDER — ATORVASTATIN CALCIUM 40 MG/1
TABLET, FILM COATED ORAL
Qty: 90 TABLET | Refills: 4 | Status: SHIPPED | OUTPATIENT
Start: 2019-09-23 | End: 2020-11-30

## 2019-10-10 ENCOUNTER — OFFICE VISIT (OUTPATIENT)
Dept: GERIATRICS | Facility: OTHER | Age: 84
End: 2019-10-10
Payer: MEDICARE

## 2019-10-10 VITALS
SYSTOLIC BLOOD PRESSURE: 138 MMHG | HEART RATE: 60 BPM | HEIGHT: 63 IN | WEIGHT: 159.8 LBS | RESPIRATION RATE: 16 BRPM | DIASTOLIC BLOOD PRESSURE: 70 MMHG | OXYGEN SATURATION: 98 % | BODY MASS INDEX: 28.31 KG/M2

## 2019-10-10 DIAGNOSIS — M25.551 RIGHT HIP PAIN: ICD-10-CM

## 2019-10-10 DIAGNOSIS — N39.3 STRESS INCONTINENCE OF URINE: ICD-10-CM

## 2019-10-10 DIAGNOSIS — F32.A DEPRESSION, UNSPECIFIED DEPRESSION TYPE: ICD-10-CM

## 2019-10-10 DIAGNOSIS — E78.49 OTHER HYPERLIPIDEMIA: ICD-10-CM

## 2019-10-10 DIAGNOSIS — I10 ESSENTIAL HYPERTENSION: ICD-10-CM

## 2019-10-10 DIAGNOSIS — E03.9 ACQUIRED HYPOTHYROIDISM: ICD-10-CM

## 2019-10-10 DIAGNOSIS — N32.81 OVERACTIVE BLADDER: Primary | ICD-10-CM

## 2019-10-10 DIAGNOSIS — R26.2 AMBULATORY DYSFUNCTION: ICD-10-CM

## 2019-10-10 PROBLEM — Z78.0 OSTEOPENIA AFTER MENOPAUSE: Status: ACTIVE | Noted: 2019-01-15

## 2019-10-10 PROBLEM — M85.80 OSTEOPENIA AFTER MENOPAUSE: Status: ACTIVE | Noted: 2019-01-15

## 2019-10-10 PROCEDURE — 99215 OFFICE O/P EST HI 40 MIN: CPT | Performed by: FAMILY MEDICINE

## 2019-10-29 ENCOUNTER — IMMUNIZATIONS (OUTPATIENT)
Dept: GERIATRICS | Facility: OTHER | Age: 84
End: 2019-10-29
Payer: MEDICARE

## 2019-10-29 DIAGNOSIS — Z23 ENCOUNTER FOR IMMUNIZATION: ICD-10-CM

## 2019-10-29 PROCEDURE — 90662 IIV NO PRSV INCREASED AG IM: CPT

## 2019-10-29 PROCEDURE — G0008 ADMIN INFLUENZA VIRUS VAC: HCPCS

## 2019-12-30 DIAGNOSIS — N32.81 OVERACTIVE BLADDER: Primary | ICD-10-CM

## 2019-12-30 RX ORDER — MIRABEGRON 50 MG/1
TABLET, FILM COATED, EXTENDED RELEASE ORAL
Qty: 90 TABLET | Refills: 4 | Status: SHIPPED | OUTPATIENT
Start: 2019-12-30 | End: 2021-02-15

## 2020-01-08 DIAGNOSIS — N32.81 OVERACTIVE BLADDER: Primary | ICD-10-CM

## 2020-01-08 DIAGNOSIS — N39.3 STRESS INCONTINENCE OF URINE: ICD-10-CM

## 2020-02-11 ENCOUNTER — OFFICE VISIT (OUTPATIENT)
Dept: GERIATRICS | Facility: OTHER | Age: 85
End: 2020-02-11
Payer: MEDICARE

## 2020-02-11 VITALS
HEART RATE: 67 BPM | SYSTOLIC BLOOD PRESSURE: 130 MMHG | WEIGHT: 158.7 LBS | BODY MASS INDEX: 28.12 KG/M2 | DIASTOLIC BLOOD PRESSURE: 70 MMHG | TEMPERATURE: 97.5 F | HEIGHT: 63 IN | OXYGEN SATURATION: 94 % | RESPIRATION RATE: 16 BRPM

## 2020-02-11 DIAGNOSIS — M85.80 OSTEOPENIA AFTER MENOPAUSE: ICD-10-CM

## 2020-02-11 DIAGNOSIS — Z78.0 OSTEOPENIA AFTER MENOPAUSE: ICD-10-CM

## 2020-02-11 DIAGNOSIS — M25.551 RIGHT HIP PAIN: ICD-10-CM

## 2020-02-11 DIAGNOSIS — I10 ESSENTIAL HYPERTENSION: ICD-10-CM

## 2020-02-11 DIAGNOSIS — F32.A DEPRESSION, UNSPECIFIED DEPRESSION TYPE: Primary | ICD-10-CM

## 2020-02-11 DIAGNOSIS — E03.9 ACQUIRED HYPOTHYROIDISM: ICD-10-CM

## 2020-02-11 DIAGNOSIS — R26.2 AMBULATORY DYSFUNCTION: ICD-10-CM

## 2020-02-11 DIAGNOSIS — N39.3 STRESS INCONTINENCE OF URINE: ICD-10-CM

## 2020-02-11 DIAGNOSIS — I71.4 AAA (ABDOMINAL AORTIC ANEURYSM) WITHOUT RUPTURE (HCC): ICD-10-CM

## 2020-02-11 PROCEDURE — 99214 OFFICE O/P EST MOD 30 MIN: CPT | Performed by: FAMILY MEDICINE

## 2020-02-11 RX ORDER — SERTRALINE HYDROCHLORIDE 100 MG/1
TABLET, FILM COATED ORAL
Qty: 90 TABLET | Refills: 2 | Status: SHIPPED | OUTPATIENT
Start: 2020-02-11 | End: 2020-05-12 | Stop reason: SDUPTHER

## 2020-02-11 NOTE — ASSESSMENT & PLAN NOTE
· Continue Myrbetriq 50 mg po daily  · Unsuccessful changing timing of medication  · She is currently taking it in AM   · Continue to avoid fluid at bedtime  · Still declining Uro-Gyn referral at this time

## 2020-02-11 NOTE — ASSESSMENT & PLAN NOTE
· Reviewed recent DXA scan done 2/28/19 that showed evidence of Osteopenia with a T-Score of -1 8  · It was noted on the DEXA scan that there was an increase in bone mineral density seen in the lumbar spine that was likely falsely elevated due to progression of degenerative changes within this region  Recommendation was made bone mineral density of the forearm to also be assessed when follow up densitometry is performed due to the advanced degenearative changes seen in the lumbar spine and hip  Bone densitometry of the forearm is less susceptible to degenerative disc and degenerative joint disease  · Plan to repeat DXA in Februay 2021  · Education provided regarding risk factors and prevention for osteoporosis  · Encouraged weight bearing exercise and Vitamin D/Calcium supplementation

## 2020-02-11 NOTE — ASSESSMENT & PLAN NOTE
· Last TSH done 5/28/2019 within normal limits at 2 5  · Continue levothyroxine at current dose  · Plan to recheck TSH prior to next visit in 4 months

## 2020-02-11 NOTE — ASSESSMENT & PLAN NOTE
· Seen by Dr Kristi Wright, s/p trochanteric steroid injection of bursitis  · Much improved although still present  · Continue Tylenol ATC, heating pads, skin precautions advised  · Recently completed PT course with improvement  · Encouraged to continue home exercise regimen

## 2020-02-11 NOTE — ASSESSMENT & PLAN NOTE
· States she has been a little bit more depressed lately  She thinks the weather has a lot to do with it  · She is not very social, never has been but admits to getting bored often  · Will increase Sertraline to 100 mg po daily, switch dosing to QHS  · Offered behavioral therapy referral, she declined at this time  · Check BMP prior to next appointment

## 2020-02-11 NOTE — PROGRESS NOTES
Anupama Clarke 4/9/1931 female MRN: [id number]    Geriatric Medicine Follow-up Visit    ASSESSMENT/PLAN  Depression  · States she has been a little bit more depressed lately  She thinks the weather has a lot to do with it  · She is not very social, never has been but admits to getting bored often  · Will increase Sertraline to 100 mg po daily, switch dosing to QHS  · Offered behavioral therapy referral, she declined at this time  · Check BMP prior to next appointment  Stress incontinence of urine  · Continue Myrbetriq 50 mg po daily  · Unsuccessful changing timing of medication  · She is currently taking it in AM   · Continue to avoid fluid at bedtime  · Still declining Uro-Gyn referral at this time  Right hip pain  · Seen by Dr Hoyos Situ, s/p trochanteric steroid injection of bursitis  · Much improved although still present  · Continue Tylenol ATC, heating pads, skin precautions advised  · Recently completed PT course with improvement  · Encouraged to continue home exercise regimen  Osteopenia after menopause  · Reviewed recent DXA scan done 2/28/19 that showed evidence of Osteopenia with a T-Score of -1 8  · It was noted on the DEXA scan that there was an increase in bone mineral density seen in the lumbar spine that was likely falsely elevated due to progression of degenerative changes within this region  Recommendation was made bone mineral density of the forearm to also be assessed when follow up densitometry is performed due to the advanced degenearative changes seen in the lumbar spine and hip  Bone densitometry of the forearm is less susceptible to degenerative disc and degenerative joint disease  · Plan to repeat DXA in Februay 2021  · Education provided regarding risk factors and prevention for osteoporosis  · Encouraged weight bearing exercise and Vitamin D/Calcium supplementation  Essential hypertension  · BP stable and well controlled    · Continue Benazepril  · Reviewed recent BMP done 2/4/2020, stable renal function and electrolytes  · Follow up in 4 months  Acquired hypothyroidism  · Last TSH done 5/28/2019 within normal limits at 2 5  · Continue levothyroxine at current dose  · Plan to recheck TSH prior to next visit in 4 months  Ambulatory dysfunction  · Denies any recent falls, states she just walks slow  · Compliant with her Rollator  · Strongly encouraged to stay as active as possible  Future Appointments   Date Time Provider Chidi Sandhu   6/16/2020  2:30 PM Milton Hogan MD [facility]          SUBJECTIVE  CC: Follow-up (4 months routine)      HPI:  Saravanan Claire is a 80 y o  female who presents for follow up on chronic conditions      Patient has HTN, chronic edema, HLD, HTN  She was seen last 4 months ago  About 8 months ago referred her for R hip evaluation per Dr Rodolfo Naranjo, concerned for trochanteric bursitis  She received a steroid injection with moderate relief but does admit to persistent pain on her lateral R hip  States the pain is much better that it was before  she completed a course of PT a couple of weeks ago with improvement but admits to not being very compliant with home exercises  She is riding a bike for 15 minutes three times per week  Non radiating pain, hurts more in the morning and also with ambulation  Pain improves with heating pad and also with tylenol  She has been taking Tylenol 1000 mg po q morning and states that provides relief       She has remained active but states she was never a social person  She admits she is not walking as much as she should  She is walking about 10 minutes every day  She does not do much all day  Sits around a lot and uses her Ipad often       She has underlying Overactive bladder, taking Myrbetriq, states she kept forgetting to take it at 3 pm and went back to AM dosing  States Myrbetriq helps but it is usually tough at night       States she is tired most of the time  states she usually gets a little sad during these time of the year  she zeeshan   otherwise offers no new complaints  Review of Systems   Constitutional: Negative for activity change, appetite change, fatigue, fever and unexpected weight change  HENT: Negative for congestion, dental problem, hearing loss and trouble swallowing  Eyes: Negative for visual disturbance  Respiratory: Negative for apnea, cough, choking, chest tightness and shortness of breath  Cardiovascular: Negative for chest pain, palpitations and leg swelling  Gastrointestinal: Negative for abdominal distention, abdominal pain, constipation, diarrhea, nausea and vomiting  Genitourinary: Negative for difficulty urinating, dysuria and flank pain  Musculoskeletal: Positive for arthralgias and gait problem  Skin: Negative for rash and wound  Psychiatric/Behavioral: Negative for agitation, behavioral problems, confusion and sleep disturbance  All other systems reviewed and are negative  Historical Information   The patient history was reviewed as follows:    Past Medical History:   Diagnosis Date    Disorder of bone     Edema     Essential hypertension     Hyperlipemia     Hyperlipidemia     Hypertension      No past surgical history on file    Family History   Family history unknown: Yes      Social History   Social History     Substance and Sexual Activity   Alcohol Use Yes    Alcohol/week: 7 0 standard drinks    Types: 7 Glasses of wine per week     Social History     Substance and Sexual Activity   Drug Use No     Social History     Tobacco Use   Smoking Status Former Smoker   Smokeless Tobacco Never Used       Medications:     Current Outpatient Medications:     amLODIPine (NORVASC) 5 mg tablet, Take 1 tablet (5 mg total) by mouth daily, Disp: 90 tablet, Rfl: 0    aspirin 81 MG tablet, Take 81 mg by mouth, Disp: , Rfl:     atorvastatin (LIPITOR) 40 mg tablet, TAKE 1 TABLET DAILY, Disp: 90 tablet, Rfl: 4    benazepril (LOTENSIN) 20 mg tablet, TAKE 1 TABLET DAILY, Disp: 90 tablet, Rfl: 3    Calcium Carbonate-Vit D-Min (CALCIUM 1200 PO), Take by mouth Take 2 tablets daily, Disp: , Rfl:     cholecalciferol (VITAMIN D3) 1,000 units tablet, Take 1,000 Units by mouth daily, Disp: , Rfl:     levothyroxine (SYNTHROID) 25 mcg tablet, Take 1 5 tablets (37 5 mcg total) by mouth daily, Disp: 90 tablet, Rfl: 3    MYRBETRIQ 50 MG TB24, TAKE 1 TABLET DAILY, Disp: 90 tablet, Rfl: 4    sertraline (ZOLOFT) 100 mg tablet, Take 1 1/2 tablets daily, Disp: 90 tablet, Rfl: 2  No Known Allergies    OBJECTIVE    Vitals:   Vitals:    02/11/20 1347   BP: 130/70   BP Location: Left arm   Patient Position: Sitting   Cuff Size: Standard   Pulse: 67   Resp: 16   Temp: 97 5 °F (36 4 °C)   TempSrc: Temporal   SpO2: 94%   Weight: 72 kg (158 lb 11 2 oz)   Height: 5' 3" (1 6 m)           Physical Exam   Constitutional: She is oriented to person, place, and time  She appears well-developed and well-nourished  No distress  HENT:   Head: Normocephalic and atraumatic  Mouth/Throat: Oropharynx is clear and moist  No oropharyngeal exudate  Eyes: Pupils are equal, round, and reactive to light  Conjunctivae and EOM are normal  Right eye exhibits no discharge  Left eye exhibits no discharge  No scleral icterus  Neck: Neck supple  No JVD present  Cardiovascular: Normal rate, regular rhythm, normal heart sounds and intact distal pulses  Exam reveals no gallop and no friction rub  No murmur heard  Pulmonary/Chest: Effort normal and breath sounds normal  No respiratory distress  Abdominal: Soft  Bowel sounds are normal  She exhibits no distension  There is no tenderness  Musculoskeletal: She exhibits no edema, tenderness or deformity  Neurological: She is alert and oriented to person, place, and time  She displays normal reflexes  No cranial nerve deficit  Skin: No rash noted  She is not diaphoretic  No erythema  No pallor  Psychiatric: She has a normal mood and affect   Her behavior is normal  Thought content normal

## 2020-03-26 DIAGNOSIS — E03.9 ACQUIRED HYPOTHYROIDISM: ICD-10-CM

## 2020-03-26 RX ORDER — LEVOTHYROXINE SODIUM 0.03 MG/1
37.5 TABLET ORAL DAILY
Qty: 135 TABLET | Refills: 3 | Status: SHIPPED | OUTPATIENT
Start: 2020-03-26 | End: 2021-03-29

## 2020-05-12 DIAGNOSIS — F32.A DEPRESSION, UNSPECIFIED DEPRESSION TYPE: ICD-10-CM

## 2020-05-12 RX ORDER — SERTRALINE HYDROCHLORIDE 100 MG/1
TABLET, FILM COATED ORAL
Qty: 90 TABLET | Refills: 3 | Status: SHIPPED | OUTPATIENT
Start: 2020-05-12 | End: 2020-11-24

## 2020-06-04 DIAGNOSIS — I10 ESSENTIAL HYPERTENSION: ICD-10-CM

## 2020-06-04 RX ORDER — BENAZEPRIL HYDROCHLORIDE 20 MG/1
TABLET ORAL
Qty: 90 TABLET | Refills: 3 | Status: SHIPPED | OUTPATIENT
Start: 2020-06-04 | End: 2021-06-01

## 2020-06-12 ENCOUNTER — TELEPHONE (OUTPATIENT)
Dept: GERIATRICS | Facility: OTHER | Age: 85
End: 2020-06-12

## 2020-08-11 ENCOUNTER — OFFICE VISIT (OUTPATIENT)
Dept: GERIATRICS | Facility: OTHER | Age: 85
End: 2020-08-11
Payer: MEDICARE

## 2020-08-11 VITALS
HEART RATE: 61 BPM | WEIGHT: 157.6 LBS | DIASTOLIC BLOOD PRESSURE: 60 MMHG | RESPIRATION RATE: 16 BRPM | OXYGEN SATURATION: 96 % | TEMPERATURE: 97.6 F | SYSTOLIC BLOOD PRESSURE: 114 MMHG | HEIGHT: 63 IN | BODY MASS INDEX: 27.93 KG/M2

## 2020-08-11 DIAGNOSIS — I10 ESSENTIAL HYPERTENSION: ICD-10-CM

## 2020-08-11 DIAGNOSIS — R26.2 AMBULATORY DYSFUNCTION: ICD-10-CM

## 2020-08-11 DIAGNOSIS — F32.A DEPRESSION, UNSPECIFIED DEPRESSION TYPE: ICD-10-CM

## 2020-08-11 DIAGNOSIS — E78.49 OTHER HYPERLIPIDEMIA: ICD-10-CM

## 2020-08-11 DIAGNOSIS — M25.551 RIGHT HIP PAIN: ICD-10-CM

## 2020-08-11 DIAGNOSIS — N32.81 OVERACTIVE BLADDER: ICD-10-CM

## 2020-08-11 DIAGNOSIS — E03.9 ACQUIRED HYPOTHYROIDISM: Primary | ICD-10-CM

## 2020-08-11 DIAGNOSIS — N39.3 STRESS INCONTINENCE OF URINE: ICD-10-CM

## 2020-08-11 PROCEDURE — 99214 OFFICE O/P EST MOD 30 MIN: CPT | Performed by: FAMILY MEDICINE

## 2020-08-11 NOTE — ASSESSMENT & PLAN NOTE
· Last TSH 6/11/2020 slightly elevated  · Will recheck TSH with reflex free t4 before adjusting medication

## 2020-08-11 NOTE — PROGRESS NOTES
Joseph Hawkins 4/9/1931 female MRN: [id number]    Geriatric Medicine Follow-up Visit    ASSESSMENT/PLAN  Acquired hypothyroidism  · Last TSH 6/11/2020 slightly elevated  · Will recheck TSH with reflex free t4 before adjusting medication  Right hip pain  · Has continued to follow with Dr Berna Lin  · Recently, referred back to PT, states she is tolerating therapy well and has noticed some improvement  · Continue Tylenol 975 mg po Q8 hrs  Ambulatory dysfunction  · Continue PT  · Fall precautions are advised  · Encouraged to stay as active as possible  Stress incontinence of urine  · Currently on Myrbetriq 50 mg daily  States she is not sure Myrbetriq is actually helping  · She was supposed to switch taking time to PM but has not done se  · Continue to avoid fluids at bedtime  · She is still refusing Uro-Gyn referral at this time  Other hyperlipidemia  · Patient remains very functional, has underlying HTN that is well controlled and advanced age  She has been taking Atorvastatin 40 mg po daily without undesired side effects  · Will continue statin therapy at this time  · Check lipid panel prior to next visit  Overactive bladder  · Continues with complaints  · Declines Uro-Gyn referral again  Essential hypertension  · BP stable and well controlled  · Continue Benazepril  · Reviewed recent BMP, renal function and electrolytes unremarkable  · Continue to monitor  Depression  · GDS done today in the office  Scored 9/15  · Discussed behavioral therapy again, referral provided  · Continue Sertraline at this time  Will switch to PM, she was supposed to do so after previous visit but did not  · Hopefully switching to PM will decrease daytime tiredness  · Patient states it is difficult to change socialization habits at this time due to current Covid 19 pandemic and facility's restrictions    · We discussed possibly switching to a different SSRI to help better control her depression but she declines at this time  Future Appointments   Date Time Provider Chidi Sandhu   9/1/2020  1:30 PM Cedric Ku MD [facility]          SUBJECTIVE  CC: Follow-up (Pt here for routine visit)      HPI:  Saurabh Pate is a 80 y o  female who presents for follow up on chronic conditions  States she has been feeling depressed but not so bad, states, just the same as before  States if anything she is more lonely than anything, she has no close friends, her close family members and close friends have now passed  She is worried about her difficulty ambulating  States she has continued to follow with Dr Marcy Chen of [facility] and was recently referred back to PT with improvement of her R hip pain that radiates down to her RLE down to the knee  Continues to take tylenol TID and aspercreme/lidocaine with some improvement  In terms of her stress incontinence, she never switch myrbetriq to night time, states she continues to experience urinary incontinence and increased frequency at night  Continues to decline Uro-Gyn referral      She has underlying hypertension, currently on benazepril, states she is compliant with medications  BP stable and well controlled  Has underlying hypothyroidism, most recent TSH done 6/11/2020, very slightly elevated at 4 08        Review of Systems    Historical Information   The patient history was reviewed as follows:    Past Medical History:   Diagnosis Date    Disorder of bone     Edema     Essential hypertension     Hyperlipemia     Hyperlipidemia     Hypertension      No past surgical history on file    Family History   Family history unknown: Yes      Social History   Social History     Substance and Sexual Activity   Alcohol Use Yes    Alcohol/week: 7 0 standard drinks    Types: 7 Glasses of wine per week     Social History     Substance and Sexual Activity   Drug Use No     Social History     Tobacco Use   Smoking Status Former Smoker   Smokeless Tobacco Never Used       Medications:     Current Outpatient Medications:     amLODIPine (NORVASC) 5 mg tablet, Take 1 tablet (5 mg total) by mouth daily, Disp: 90 tablet, Rfl: 0    aspirin 81 MG tablet, Take 81 mg by mouth, Disp: , Rfl:     atorvastatin (LIPITOR) 40 mg tablet, TAKE 1 TABLET DAILY, Disp: 90 tablet, Rfl: 4    benazepril (LOTENSIN) 20 mg tablet, TAKE 1 TABLET DAILY, Disp: 90 tablet, Rfl: 3    Calcium Carbonate-Vit D-Min (CALCIUM 1200 PO), Take by mouth Take 2 tablets daily, Disp: , Rfl:     cholecalciferol (VITAMIN D3) 1,000 units tablet, Take 1,000 Units by mouth daily, Disp: , Rfl:     levothyroxine (Synthroid) 25 mcg tablet, Take 1 5 tablets (37 5 mcg total) by mouth daily, Disp: 135 tablet, Rfl: 3    MYRBETRIQ 50 MG TB24, TAKE 1 TABLET DAILY, Disp: 90 tablet, Rfl: 4    sertraline (ZOLOFT) 100 mg tablet, Take one tablet daily, Disp: 90 tablet, Rfl: 3  No Known Allergies    OBJECTIVE    Vitals:   Vitals:    08/11/20 1447   BP: 114/60   BP Location: Right arm   Patient Position: Sitting   Cuff Size: Standard   Pulse: 61   Resp: 16   Temp: 97 6 °F (36 4 °C)   TempSrc: Temporal   SpO2: 96%   Weight: 71 5 kg (157 lb 9 6 oz)   Height: 5' 3" (1 6 m)           Physical Exam  Vitals signs reviewed  Constitutional:       General: She is not in acute distress  Appearance: She is well-developed and normal weight  She is not diaphoretic  HENT:      Head: Normocephalic and atraumatic  Right Ear: External ear normal       Left Ear: External ear normal       Mouth/Throat:      Mouth: Mucous membranes are moist       Pharynx: Oropharynx is clear  No oropharyngeal exudate  Eyes:      Conjunctiva/sclera: Conjunctivae normal       Pupils: Pupils are equal, round, and reactive to light  Neck:      Vascular: No JVD  Trachea: No tracheal deviation  Cardiovascular:      Rate and Rhythm: Normal rate and regular rhythm  Heart sounds: Normal heart sounds  No murmur  Pulmonary:      Effort: Pulmonary effort is normal  No respiratory distress  Breath sounds: Normal breath sounds  Abdominal:      General: Bowel sounds are normal  There is no distension  Palpations: Abdomen is soft  Tenderness: There is no abdominal tenderness  Musculoskeletal:         General: No tenderness or deformity  Skin:     General: Skin is warm and dry  Coloration: Skin is not pale  Findings: No erythema or rash  Neurological:      General: No focal deficit present  Mental Status: She is alert and oriented to person, place, and time  Cranial Nerves: No cranial nerve deficit  Psychiatric:         Mood and Affect: Mood normal          Behavior: Behavior normal          Thought Content:  Thought content normal          Judgment: Judgment normal

## 2020-08-13 NOTE — ASSESSMENT & PLAN NOTE
· GDS done today in the office  Scored 9/15  · Discussed behavioral therapy again, referral provided  · Continue Sertraline at this time  Will switch to PM, she was supposed to do so after previous visit but did not  · Hopefully switching to PM will decrease daytime tiredness  · Patient states it is difficult to change socialization habits at this time due to current Covid 19 pandemic and facility's restrictions  · We discussed possibly switching to a different SSRI to help better control her depression but she declines at this time

## 2020-08-13 NOTE — ASSESSMENT & PLAN NOTE
· Has continued to follow with Dr Iliana Jolly  · Recently, referred back to PT, states she is tolerating therapy well and has noticed some improvement  · Continue Tylenol 975 mg po Q8 hrs

## 2020-08-13 NOTE — ASSESSMENT & PLAN NOTE
· Patient remains very functional, has underlying HTN that is well controlled and advanced age  She has been taking Atorvastatin 40 mg po daily without undesired side effects  · Will continue statin therapy at this time  · Check lipid panel prior to next visit

## 2020-08-13 NOTE — ASSESSMENT & PLAN NOTE
· BP stable and well controlled  · Continue Benazepril  · Reviewed recent BMP, renal function and electrolytes unremarkable  · Continue to monitor

## 2020-08-13 NOTE — ASSESSMENT & PLAN NOTE
· Currently on Myrbetriq 50 mg daily  States she is not sure Myrbetriq is actually helping  · She was supposed to switch taking time to PM but has not done se  · Continue to avoid fluids at bedtime  · She is still refusing Uro-Gyn referral at this time

## 2020-08-18 ENCOUNTER — TELEPHONE (OUTPATIENT)
Dept: BEHAVIORAL/MENTAL HEALTH CLINIC | Facility: CLINIC | Age: 85
End: 2020-08-18

## 2020-08-18 NOTE — TELEPHONE ENCOUNTER
Left voicemail for patient to schedule appointment with [facility], per Dr Brayan Bergeron referral to [facility]  Patient advised to tell her nurse or someone in the main office if she would like to follow up with counseling

## 2020-09-04 DIAGNOSIS — I10 HYPERTENSION, UNSPECIFIED TYPE: ICD-10-CM

## 2020-09-04 RX ORDER — AMLODIPINE BESYLATE 5 MG/1
TABLET ORAL
Qty: 90 TABLET | Refills: 3 | Status: SHIPPED | OUTPATIENT
Start: 2020-09-04 | End: 2021-09-01

## 2020-09-15 ENCOUNTER — OFFICE VISIT (OUTPATIENT)
Dept: GERIATRICS | Facility: OTHER | Age: 85
End: 2020-09-15
Payer: MEDICARE

## 2020-09-15 VITALS
BODY MASS INDEX: 27.67 KG/M2 | SYSTOLIC BLOOD PRESSURE: 144 MMHG | WEIGHT: 156.2 LBS | TEMPERATURE: 97.7 F | OXYGEN SATURATION: 97 % | DIASTOLIC BLOOD PRESSURE: 78 MMHG | HEART RATE: 69 BPM | RESPIRATION RATE: 18 BRPM

## 2020-09-15 DIAGNOSIS — R26.2 AMBULATORY DYSFUNCTION: ICD-10-CM

## 2020-09-15 DIAGNOSIS — M25.551 RIGHT HIP PAIN: ICD-10-CM

## 2020-09-15 DIAGNOSIS — E03.9 ACQUIRED HYPOTHYROIDISM: Primary | ICD-10-CM

## 2020-09-15 DIAGNOSIS — F32.A DEPRESSION, UNSPECIFIED DEPRESSION TYPE: ICD-10-CM

## 2020-09-15 PROCEDURE — 99214 OFFICE O/P EST MOD 30 MIN: CPT | Performed by: FAMILY MEDICINE

## 2020-09-15 NOTE — PROGRESS NOTES
Corby Pardo 4/9/1931 female MRN: [id number]    Geriatric Medicine Follow-up Visit    ASSESSMENT/PLAN  Acquired hypothyroidism  · Previous TSH elevated in June  · Repeat TSH in August back to normal limits  No medication adjustment necessary at this time  · Continue to monitor  Ambulatory dysfunction  · Patient had a recent fall about 2 weeks ago  · States she is not sure how it happened but she fell and landed on her knees  · She has continued to see PT and feels she is getting better  · Encouraged her to walk for exercise daily  Right hip pain  · Very much improved  · States she has continued with PT and feels better  · Continues to take Tylenol as needed  Depression  · Stable  She states she still feels depressed, some days more than others  · She denies suicidal ideation  · She is agreeable to increasing sertraline dose  Will increase to 125 mg daily for now  · Difficult to change social habits at this time with Covid restrictions  · Encouraged to exercise, walk the halls daily  · She was referred to behavioral therapist at previous visit but has not scheduled an appointment, will think about it  Future Appointments   Date Time Provider Chidi Sandhu   9/29/2020 10:50 AM [facility] NURSE [facility]          SUBJECTIVE  CC: Follow-up (Hypothyroidism)      HPI:  Corby Pardo is a 80 y o  female who presents for follow up on depression and to review recent TSH  She reports feeling well  States she has now switched her sertraline dose to bedtime but continues to feel sleepy throughout the day  States her mood is unchanged, some days are good, some days are not so good  States she is thinking about the behavioral therapy referral  Has not scheduled appointment yet  Review of Systems   Constitutional: Negative for activity change, appetite change, fatigue, fever and unexpected weight change     HENT: Negative for congestion, dental problem, hearing loss and trouble swallowing  Eyes: Negative for visual disturbance  Respiratory: Negative for apnea, cough, choking, chest tightness and shortness of breath  Cardiovascular: Negative for chest pain, palpitations and leg swelling  Gastrointestinal: Negative for abdominal distention, abdominal pain, constipation, diarrhea, nausea and vomiting  Genitourinary: Negative for difficulty urinating, dysuria and flank pain  Musculoskeletal: Positive for arthralgias and gait problem  Skin: Negative for rash and wound  Psychiatric/Behavioral: Negative for agitation, behavioral problems, confusion and sleep disturbance  All other systems reviewed and are negative  Historical Information   The patient history was reviewed as follows:    Past Medical History:   Diagnosis Date    Disorder of bone     Edema     Essential hypertension     Hyperlipemia     Hyperlipidemia     Hypertension      No past surgical history on file    Family History   Family history unknown: Yes      Social History   Social History     Substance and Sexual Activity   Alcohol Use Yes    Alcohol/week: 7 0 standard drinks    Types: 7 Glasses of wine per week     Social History     Substance and Sexual Activity   Drug Use No     Social History     Tobacco Use   Smoking Status Former Smoker   Smokeless Tobacco Never Used       Medications:     Current Outpatient Medications:     amLODIPine (NORVASC) 5 mg tablet, TAKE 1 TABLET DAILY, Disp: 90 tablet, Rfl: 3    aspirin 81 MG tablet, Take 81 mg by mouth, Disp: , Rfl:     atorvastatin (LIPITOR) 40 mg tablet, TAKE 1 TABLET DAILY, Disp: 90 tablet, Rfl: 4    benazepril (LOTENSIN) 20 mg tablet, TAKE 1 TABLET DAILY, Disp: 90 tablet, Rfl: 3    Calcium Carbonate-Vit D-Min (CALCIUM 1200 PO), Take by mouth Take 2 tablets daily, Disp: , Rfl:     cholecalciferol (VITAMIN D3) 1,000 units tablet, Take 1,000 Units by mouth daily, Disp: , Rfl:     levothyroxine (Synthroid) 25 mcg tablet, Take 1 5 tablets (37 5 mcg total) by mouth daily, Disp: 135 tablet, Rfl: 3    MYRBETRIQ 50 MG TB24, TAKE 1 TABLET DAILY, Disp: 90 tablet, Rfl: 4    sertraline (ZOLOFT) 100 mg tablet, Take one tablet daily, Disp: 90 tablet, Rfl: 3    sertraline (ZOLOFT) 50 mg tablet, Take 0 5 tablets (25 mg total) by mouth daily at bedtime for 7 days, THEN 1 tablet (50 mg total) daily at bedtime  , Disp: 94 tablet, Rfl: 1  No Known Allergies    OBJECTIVE    Vitals:   Vitals:    09/15/20 1336   BP: 144/78   BP Location: Right arm   Patient Position: Sitting   Cuff Size: Standard   Pulse: 69   Resp: 18   Temp: 97 7 °F (36 5 °C)   TempSrc: Temporal   SpO2: 97%   Weight: 70 9 kg (156 lb 3 2 oz)           Physical Exam  Constitutional:       General: She is not in acute distress  Appearance: She is well-developed  She is not diaphoretic  HENT:      Head: Normocephalic and atraumatic  Right Ear: External ear normal       Left Ear: External ear normal       Mouth/Throat:      Mouth: Mucous membranes are moist       Pharynx: Oropharynx is clear  No oropharyngeal exudate  Eyes:      General: No scleral icterus  Right eye: No discharge  Left eye: No discharge  Conjunctiva/sclera: Conjunctivae normal       Pupils: Pupils are equal, round, and reactive to light  Neck:      Vascular: No JVD  Trachea: No tracheal deviation  Cardiovascular:      Rate and Rhythm: Normal rate and regular rhythm  Heart sounds: Normal heart sounds  No murmur  No friction rub  No gallop  Pulmonary:      Effort: No respiratory distress  Breath sounds: Normal breath sounds  Abdominal:      General: Bowel sounds are normal  There is no distension  Palpations: Abdomen is soft  Tenderness: There is no abdominal tenderness  Musculoskeletal:         General: No tenderness or deformity  Skin:     Coloration: Skin is not pale  Findings: No erythema or rash     Neurological:      General: No focal deficit present  Mental Status: She is alert  Cranial Nerves: No cranial nerve deficit  Deep Tendon Reflexes: Reflexes normal    Psychiatric:         Mood and Affect: Mood normal          Behavior: Behavior normal          Thought Content:  Thought content normal          Judgment: Judgment normal                     9/15/2020

## 2020-09-15 NOTE — ASSESSMENT & PLAN NOTE
· Stable  She states she still feels depressed, some days more than others  · She denies suicidal ideation  · She is agreeable to increasing sertraline dose  Will increase to 125 mg daily for now  · Difficult to change social habits at this time with Covid restrictions  · Encouraged to exercise, walk the halls daily  · She was referred to behavioral therapist at previous visit but has not scheduled an appointment, will think about it

## 2020-09-15 NOTE — ASSESSMENT & PLAN NOTE
· Patient had a recent fall about 2 weeks ago  · States she is not sure how it happened but she fell and landed on her knees  · She has continued to see PT and feels she is getting better  · Encouraged her to walk for exercise daily

## 2020-09-15 NOTE — ASSESSMENT & PLAN NOTE
· Very much improved  · States she has continued with PT and feels better  · Continues to take Tylenol as needed

## 2020-09-15 NOTE — ASSESSMENT & PLAN NOTE
· Previous TSH elevated in June  · Repeat TSH in August back to normal limits  No medication adjustment necessary at this time  · Continue to monitor

## 2020-09-29 ENCOUNTER — IMMUNIZATIONS (OUTPATIENT)
Dept: GERIATRICS | Facility: OTHER | Age: 85
End: 2020-09-29
Payer: MEDICARE

## 2020-09-29 DIAGNOSIS — Z23 ENCOUNTER FOR IMMUNIZATION: ICD-10-CM

## 2020-09-29 PROCEDURE — 90662 IIV NO PRSV INCREASED AG IM: CPT | Performed by: FAMILY MEDICINE

## 2020-09-29 PROCEDURE — G0008 ADMIN INFLUENZA VIRUS VAC: HCPCS | Performed by: FAMILY MEDICINE

## 2020-11-02 ENCOUNTER — TELEPHONE (OUTPATIENT)
Dept: GERIATRICS | Facility: OTHER | Age: 85
End: 2020-11-02

## 2020-11-19 ENCOUNTER — APPOINTMENT (OUTPATIENT)
Dept: RADIOLOGY | Facility: AMBULARY SURGERY CENTER | Age: 85
End: 2020-11-19
Attending: ORTHOPAEDIC SURGERY
Payer: MEDICARE

## 2020-11-19 ENCOUNTER — OFFICE VISIT (OUTPATIENT)
Dept: OBGYN CLINIC | Facility: CLINIC | Age: 85
End: 2020-11-19
Payer: MEDICARE

## 2020-11-19 VITALS
HEART RATE: 67 BPM | WEIGHT: 156 LBS | BODY MASS INDEX: 27.64 KG/M2 | DIASTOLIC BLOOD PRESSURE: 71 MMHG | HEIGHT: 63 IN | TEMPERATURE: 97.4 F | SYSTOLIC BLOOD PRESSURE: 166 MMHG

## 2020-11-19 DIAGNOSIS — M25.561 RIGHT KNEE PAIN, UNSPECIFIED CHRONICITY: ICD-10-CM

## 2020-11-19 DIAGNOSIS — M25.551 RIGHT HIP PAIN: ICD-10-CM

## 2020-11-19 DIAGNOSIS — M70.61 GREATER TROCHANTERIC BURSITIS OF RIGHT HIP: Primary | ICD-10-CM

## 2020-11-19 DIAGNOSIS — M16.11 PRIMARY OSTEOARTHRITIS OF ONE HIP, RIGHT: ICD-10-CM

## 2020-11-19 DIAGNOSIS — M17.11 PRIMARY OSTEOARTHRITIS OF RIGHT KNEE: ICD-10-CM

## 2020-11-19 DIAGNOSIS — R29.6 RECURRENT FALLS: ICD-10-CM

## 2020-11-19 PROCEDURE — 73502 X-RAY EXAM HIP UNI 2-3 VIEWS: CPT

## 2020-11-19 PROCEDURE — 73562 X-RAY EXAM OF KNEE 3: CPT

## 2020-11-19 PROCEDURE — 20610 DRAIN/INJ JOINT/BURSA W/O US: CPT | Performed by: ORTHOPAEDIC SURGERY

## 2020-11-19 PROCEDURE — 99213 OFFICE O/P EST LOW 20 MIN: CPT | Performed by: ORTHOPAEDIC SURGERY

## 2020-11-19 RX ORDER — BUPIVACAINE HYDROCHLORIDE 2.5 MG/ML
1 INJECTION, SOLUTION INFILTRATION; PERINEURAL
Status: COMPLETED | OUTPATIENT
Start: 2020-11-19 | End: 2020-11-19

## 2020-11-19 RX ORDER — BUPIVACAINE HYDROCHLORIDE 5 MG/ML
2 INJECTION, SOLUTION EPIDURAL; INTRACAUDAL
Status: COMPLETED | OUTPATIENT
Start: 2020-11-19 | End: 2020-11-19

## 2020-11-19 RX ORDER — BETAMETHASONE SODIUM PHOSPHATE AND BETAMETHASONE ACETATE 3; 3 MG/ML; MG/ML
12 INJECTION, SUSPENSION INTRA-ARTICULAR; INTRALESIONAL; INTRAMUSCULAR; SOFT TISSUE
Status: COMPLETED | OUTPATIENT
Start: 2020-11-19 | End: 2020-11-19

## 2020-11-19 RX ORDER — LIDOCAINE HYDROCHLORIDE 5 MG/ML
1 INJECTION, SOLUTION INFILTRATION; PERINEURAL
Status: COMPLETED | OUTPATIENT
Start: 2020-11-19 | End: 2020-11-19

## 2020-11-19 RX ORDER — LIDOCAINE HYDROCHLORIDE 10 MG/ML
1 INJECTION, SOLUTION INFILTRATION; PERINEURAL
Status: COMPLETED | OUTPATIENT
Start: 2020-11-19 | End: 2020-11-19

## 2020-11-19 RX ADMIN — LIDOCAINE HYDROCHLORIDE 1 ML: 5 INJECTION, SOLUTION INFILTRATION; PERINEURAL at 14:23

## 2020-11-19 RX ADMIN — BETAMETHASONE SODIUM PHOSPHATE AND BETAMETHASONE ACETATE 12 MG: 3; 3 INJECTION, SUSPENSION INTRA-ARTICULAR; INTRALESIONAL; INTRAMUSCULAR; SOFT TISSUE at 14:23

## 2020-11-19 RX ADMIN — BUPIVACAINE HYDROCHLORIDE 2 ML: 5 INJECTION, SOLUTION EPIDURAL; INTRACAUDAL at 14:23

## 2020-11-19 RX ADMIN — LIDOCAINE HYDROCHLORIDE 1 ML: 10 INJECTION, SOLUTION INFILTRATION; PERINEURAL at 14:23

## 2020-11-19 RX ADMIN — BUPIVACAINE HYDROCHLORIDE 1 ML: 2.5 INJECTION, SOLUTION INFILTRATION; PERINEURAL at 14:23

## 2020-11-20 ENCOUNTER — TELEPHONE (OUTPATIENT)
Dept: GERIATRICS | Facility: OTHER | Age: 85
End: 2020-11-20

## 2020-11-24 ENCOUNTER — OFFICE VISIT (OUTPATIENT)
Dept: GERIATRICS | Facility: OTHER | Age: 85
End: 2020-11-24
Payer: MEDICARE

## 2020-11-24 VITALS
OXYGEN SATURATION: 95 % | BODY MASS INDEX: 27.74 KG/M2 | DIASTOLIC BLOOD PRESSURE: 70 MMHG | RESPIRATION RATE: 18 BRPM | SYSTOLIC BLOOD PRESSURE: 142 MMHG | WEIGHT: 156.6 LBS | HEART RATE: 78 BPM | TEMPERATURE: 97.3 F

## 2020-11-24 DIAGNOSIS — R26.2 AMBULATORY DYSFUNCTION: ICD-10-CM

## 2020-11-24 DIAGNOSIS — M17.11 PRIMARY OSTEOARTHRITIS OF RIGHT KNEE: ICD-10-CM

## 2020-11-24 DIAGNOSIS — M16.11 PRIMARY OSTEOARTHRITIS OF ONE HIP, RIGHT: Primary | ICD-10-CM

## 2020-11-24 DIAGNOSIS — M70.61 GREATER TROCHANTERIC BURSITIS OF RIGHT HIP: ICD-10-CM

## 2020-11-24 DIAGNOSIS — F32.A DEPRESSION, UNSPECIFIED DEPRESSION TYPE: ICD-10-CM

## 2020-11-24 PROCEDURE — 99214 OFFICE O/P EST MOD 30 MIN: CPT | Performed by: FAMILY MEDICINE

## 2020-11-24 RX ORDER — SERTRALINE HYDROCHLORIDE 100 MG/1
TABLET, FILM COATED ORAL
Qty: 135 TABLET | Refills: 2 | Status: SHIPPED | OUTPATIENT
Start: 2020-11-24 | End: 2021-01-26 | Stop reason: SDUPTHER

## 2020-11-25 ENCOUNTER — TELEPHONE (OUTPATIENT)
Dept: GERIATRICS | Facility: OTHER | Age: 85
End: 2020-11-25

## 2020-11-30 DIAGNOSIS — E78.5 HYPERLIPIDEMIA, UNSPECIFIED HYPERLIPIDEMIA TYPE: ICD-10-CM

## 2020-11-30 RX ORDER — ATORVASTATIN CALCIUM 40 MG/1
TABLET, FILM COATED ORAL
Qty: 90 TABLET | Refills: 3 | Status: SHIPPED | OUTPATIENT
Start: 2020-11-30 | End: 2021-12-01

## 2021-01-04 ENCOUNTER — APPOINTMENT (EMERGENCY)
Dept: RADIOLOGY | Facility: HOSPITAL | Age: 86
End: 2021-01-04
Payer: MEDICARE

## 2021-01-04 ENCOUNTER — HOSPITAL ENCOUNTER (EMERGENCY)
Facility: HOSPITAL | Age: 86
Discharge: HOME | End: 2021-01-04
Attending: EMERGENCY MEDICINE | Admitting: EMERGENCY MEDICINE
Payer: MEDICARE

## 2021-01-04 VITALS
HEIGHT: 63 IN | RESPIRATION RATE: 18 BRPM | TEMPERATURE: 97.2 F | OXYGEN SATURATION: 98 % | BODY MASS INDEX: 27.64 KG/M2 | SYSTOLIC BLOOD PRESSURE: 182 MMHG | HEART RATE: 67 BPM | WEIGHT: 156 LBS | DIASTOLIC BLOOD PRESSURE: 81 MMHG

## 2021-01-04 DIAGNOSIS — T14.8XXA MULTIPLE SKIN TEARS: ICD-10-CM
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
## 2021-01-04 DIAGNOSIS — M25.551 RIGHT HIP PAIN: ICD-10-CM

## 2021-01-04 PROCEDURE — 99284 EMERGENCY DEPT VISIT MOD MDM: CPT

## 2021-01-04 PROCEDURE — G1004 CDSM NDSC: HCPCS

## 2021-01-04 PROCEDURE — 99284 EMERGENCY DEPT VISIT MOD MDM: CPT | Performed by: EMERGENCY MEDICINE

## 2021-01-04 PROCEDURE — 73502 X-RAY EXAM HIP UNI 2-3 VIEWS: CPT

## 2021-01-04 PROCEDURE — 70450 CT HEAD/BRAIN W/O DYE: CPT

## 2021-01-04 RX ORDER — ACETAMINOPHEN 325 MG/1
975 TABLET ORAL ONCE
Status: COMPLETED | OUTPATIENT
Start: 2021-01-04 | End: 2021-01-04

## 2021-01-04 RX ORDER — GINSENG 100 MG
1 CAPSULE ORAL ONCE
Status: DISCONTINUED | OUTPATIENT
Start: 2021-01-04 | End: 2021-01-04 | Stop reason: HOSPADM

## 2021-01-04 RX ADMIN — ACETAMINOPHEN 975 MG: 325 TABLET ORAL at 14:45

## 2021-01-04 NOTE — ED PROVIDER NOTES
Final Diagnosis:  1  Fall    2  Right hip pain    3  Multiple skin tears        Chief Complaint   Patient presents with    Fall     Patient reports chronic right hip pain; was taking a shower and her leg gave out; no head strike or thinners; full movement      HPI  Patient presents for evaluation of right hip pain  The patient states that she was going to into the shower and that when she put all for weight on her right leg that he gave out and she fell down onto her bottom  She had immediate onset of increased right hip pain  She denies hitting her head, loss of consciousness, use of blood thinners, nausea/vomiting, visual changes  Patient states that she now has pain in her right hip  She was unable to get up under her own power and needed to receive help from the living facility where she is at  She states that she was able to ambulate slightly there  EMS endorses this as well saying that she was able to walk over to the stretcher without incident  This he locates her pain in her right hip  This is worse with standing or ambulation  Better while sitting  No other complaints  - No language barrier    - History obtained from patient  - There are no limitations to the history obtained  - Previous charting was reviewed    PMH:   has a past medical history of Disorder of bone, Edema, Essential hypertension, Hyperlipemia, Hyperlipidemia, and Hypertension  PSH:   has no past surgical history on file  ROS:  Review of Systems   Constitutional: Negative for chills, diaphoresis, fatigue and fever  Respiratory: Negative for cough and shortness of breath  Cardiovascular: Negative for chest pain and palpitations  Gastrointestinal: Negative for abdominal distention, abdominal pain, constipation, diarrhea, nausea and vomiting  Genitourinary: Negative for dysuria, frequency and hematuria  Musculoskeletal: Positive for arthralgias and myalgias  Negative for neck pain     Skin: Positive for wound    Neurological: Negative for dizziness, syncope, light-headedness and headaches  All other systems reviewed and are negative  PE:   Vitals:    01/04/21 1429 01/04/21 1431   BP: (!) 182/81    Pulse: 67    Resp: 18    Temp: (!) 97 2 °F (36 2 °C)    TempSrc: Tympanic    SpO2: 98%    Weight:  70 8 kg (156 lb)   Height:  5' 3" (1 6 m)     Vitals reviewed by me  Physical Exam  Vitals signs reviewed  Constitutional:       General: She is not in acute distress  Appearance: She is not diaphoretic  HENT:      Head: Normocephalic and atraumatic  Right Ear: External ear normal       Left Ear: External ear normal    Eyes:      General: No scleral icterus  Right eye: No discharge  Left eye: No discharge  Conjunctiva/sclera: Conjunctivae normal       Pupils: Pupils are equal, round, and reactive to light  Neck:      Musculoskeletal: Normal range of motion and neck supple  Vascular: No JVD  Cardiovascular:      Rate and Rhythm: Normal rate and regular rhythm  Heart sounds: Normal heart sounds  No murmur  No friction rub  No gallop  Pulmonary:      Effort: Pulmonary effort is normal  No respiratory distress  Breath sounds: Normal breath sounds  No wheezing or rales  Abdominal:      General: Bowel sounds are normal  There is no distension  Palpations: Abdomen is soft  There is no mass  Tenderness: There is no abdominal tenderness  There is no guarding  Musculoskeletal: Normal range of motion  General: Tenderness present  No deformity  Comments: Right hip pain  Patient is able to move her bilateral lower extremities without issue, sensation intact  Skin:     General: Skin is warm  Comments: Small skin tear on anterior aspect of right lower extremity  Two small scared hands on bilateral upper extremities  Neurological:      Mental Status: She is alert and oriented to person, place, and time        Cranial Nerves: No cranial nerve deficit  Sensory: No sensory deficit  Motor: No abnormal muscle tone  Coordination: Coordination normal       Comments: GCS 15  Sensation grossly intact  No cranial nerve deficits noted  5/5 strength bilateral upper and lower extremities  Finger-to-nose good  No pronator drift noted  Was able to ambulate without difficulty  Psychiatric:         Behavior: Behavior normal          Thought Content: Thought content normal          Judgment: Judgment normal           A:  - Nursing note reviewed  -                      CT head wo contrast   Final Result      No acute intracranial abnormality  Microangiopathic changes  9 mm calcified right paraclinoid meningioma  Workstation performed: [id number]         XR hip/pelv 2-3 vws right if performed   Final Result      No acute osseous abnormality  Degenerative changes as described  Workstation performed: [id number]           Orders Placed This Encounter   Procedures    XR hip/pelv 2-3 vws right if performed    CT head wo contrast     Labs Reviewed - No data to display      Final Diagnosis:  1  Fall    2  Right hip pain    3  Multiple skin tears        P:  - x-ray of hip failed to show any significant changes  Patient does have severe osteoarthritis in this area  -skin tears dressed here with bacitracin  -head CT without acute findings  -patient was able to ambulate without issue from her bed to the bathroom using a walker at her baseline  -return precautions discussed    Medications   bacitracin topical ointment 1 small application (has no administration in time range)   acetaminophen (TYLENOL) tablet 975 mg (975 mg Oral Given 1/4/21 1445)     Time reflects when diagnosis was documented in both MDM as applicable and the Disposition within this note     Time User Action Codes Description Comment    1/4/2021  4:29 PM Lorrane Beers Add [W19  XXXA] Fall     1/4/2021  4:29 PM Lorrane Beers Add [M25 551] Right hip pain     1/4/2021  4:29 PM Courie, Early Spikes Add Annette Martinez  8XXA] Multiple skin tears       ED Disposition     ED Disposition Condition Date/Time Comment    Discharge Stable Mon Jan 4, 2021  4:29 PM Jalen Choudhary discharge to home/self care  Follow-up Information     Follow up With Specialties Details Why Contact Info Additional [address] Emergency Department Emergency Medicine  If symptoms worsen [address]  [phone]  ED, [address] [place] [address] 108    [facility], [address]   [address]  [address] [place] [address]  [phone]           Patient's Medications   Discharge Prescriptions    No medications on file     No discharge procedures on file  Prior to Admission Medications   Prescriptions Last Dose Informant Patient Reported? Taking? Calcium Carbonate-Vit D-Min (CALCIUM 1200 PO)   Yes Yes   Sig: Take by mouth Take 2 tablets daily   MYRBETRIQ 50 MG TB24   No Yes   Sig: TAKE 1 TABLET DAILY   amLODIPine (NORVASC) 5 mg tablet   No Yes   Sig: TAKE 1 TABLET DAILY   aspirin 81 MG tablet   Yes Yes   Sig: Take 81 mg by mouth   atorvastatin (LIPITOR) 40 mg tablet   No Yes   Sig: TAKE 1 TABLET DAILY   benazepril (LOTENSIN) 20 mg tablet   No Yes   Sig: TAKE 1 TABLET DAILY   cholecalciferol (VITAMIN D3) 1,000 units tablet  Other (Specify) Yes Yes   Sig: Take 1,000 Units by mouth daily   levothyroxine (Synthroid) 25 mcg tablet   No Yes   Sig: Take 1 5 tablets (37 5 mcg total) by mouth daily   sertraline (ZOLOFT) 100 mg tablet   No Yes   Sig: Take 1 5 tablet daily  Facility-Administered Medications: None       Portions of the record may have been created with voice recognition software  Occasional wrong word or "sound a like" substitutions may have occurred due to the inherent limitations of voice recognition software   Read the chart carefully and recognize, using context, where substitutions have occurred      Electronically signed by:  Yadi Corral, PGY 3, MD Kristin Acuna MD  01/04/21 3301

## 2021-01-04 NOTE — ED ATTENDING ATTESTATION
1/4/2021  IFred MD, saw and evaluated the patient  I have discussed the patient with the resident and agree with the resident's findings, Plan of Care, and MDM as documented in the resident's note, unless otherwise documented below  All available laboratory and imaging studies were reviewed by myself  I was present for key portions of any procedure(s) performed by the resident and I was immediately available to provide assistance  I agree with the current assessment done in the Emergency Department  I have conducted an independent evaluation of this patient  80year old female presenting with right hip pain after sustaining a mechanical fall  Patient reports history of OA in right hip  Today, while getting into the shower, she lifted her left leg to get in, but her right leg gave in and patient fell down backwards, landing onto her buttocks  She did not strike her head or lose consciousness, she felt significant pain in her right hip and was unable to get up  She pushed her LifeAlert button to get help  Was unable to ambulate for EMS  In addition to pain in right hip, left forearm and right shin abrasions  No pain elsewhere  Denies pain in back or neck      ROS:  Constitutional: denies fevers, chills  Cardiac: no chest pain  Respiratory: no shortness of breath, no cough  GI: no abdominal pain, nausea, vomiting  Heme/Onc: on baby ASA, reports easy bruising  Neuro: no weakness or numbness, no headache    Ten systems reviewed and negative unless otherwise noted in HPI and above    PHYSICAL EXAM  BP (!) 182/81   Pulse 67   Temp (!) 97 2 °F (36 2 °C) (Tympanic)   Resp 18   Ht 5' 3" (1 6 m)   Wt 70 8 kg (156 lb)   SpO2 98%   BMI 27 63 kg/m²   Primary Survey  Airway: Intact  Breathing: Breathing comfortably on room air  Circulation: 2+ radial, dorsalis pedis, posterior tibial pulses  Secondary Survey  Head: Atraumatic, no edema, ecchymoses  Eyes: Negative racoon sign  Nose: No deformity  Neck: No midline C-spine tenderness  Chest: No pain with sternum or rib palpation  Respiratory: Breathing comfortably on room air  Abdomen: Non-distended, non-tender to palpation  Pelvis: Stable, pain with palpation of right greater trochanter  Extremities: No limb foreshortening, there is pain with palpation over right greater trochanter and right anterior hip  2+ DP and PT pulses  No knee or ankle deformity bilaterally  There are two abrasions to left ulnar forearm and one skin tear to right anterior shin  Back: No step-offs, ecchymoses, or deformities  Non-tender over T- and L- spine  Neuro: GCS 15  Moves all extremities  Vital signs and nursing notes reviewed        Tests  CT head wo contrast   Final Result      No acute intracranial abnormality  Microangiopathic changes  9 mm calcified right paraclinoid meningioma  Workstation performed: [id number]         XR hip/pelv 2-3 vws right if performed   Final Result      No acute osseous abnormality  Degenerative changes as described  Workstation performed: [id number]             Procedures  Procedures            ED Course  Medications   bacitracin topical ointment 1 small application (has no administration in time range)   acetaminophen (TYLENOL) tablet 975 mg (975 mg Oral Given 1/4/21 1445)       80year old female presenting with right hip pain after a mechanical fall  VS reviewed, hypertensive, WNL  Patient evaluated in accordance with ATLS guidelines  Although clinical decision rules cannot be effectively applied given patient's age, patient is A&O, does not have a distracting injury, and is reporting that her neck is not bothering her  There is no pain with palpation of midline C-spine  Patient is able to flex and extend at the neck, as well as laterally translate her neck without pain or neurologic deficit  Will obtain pelvis and right hip x-ray as well as CT head to rule out slow bleed due to bridging vein rupture    Tylenol administered for pain  I cleaned patient's left forearm and right shin abrasions/skin tears with sterile saline and dressed them with bacitracin and telfa with gauze  Patient tolerated this well  CT head without traumatic injuries, there is an incidentally found 9 mm calcified paraclinoid meningioma  X-ray of right hip without fracture or dislocation, there are severe OA changes  Patient able to ambulate in ED  Patient discharged to home with recommendations for symptom control, wound care instructions, return precautions, and plan for follow up       Clinical Impression  Final diagnoses:   Fall   Right hip pain   Multiple skin tears

## 2021-01-04 NOTE — ED NOTES
Niece will pick her up to take her back to [address] around [address], [facility]  01/04/21 0672

## 2021-01-26 ENCOUNTER — OFFICE VISIT (OUTPATIENT)
Dept: GERIATRICS | Facility: OTHER | Age: 86
End: 2021-01-26
Payer: MEDICARE

## 2021-01-26 VITALS
TEMPERATURE: 97.2 F | HEART RATE: 71 BPM | SYSTOLIC BLOOD PRESSURE: 120 MMHG | OXYGEN SATURATION: 96 % | DIASTOLIC BLOOD PRESSURE: 58 MMHG | RESPIRATION RATE: 18 BRPM

## 2021-01-26 DIAGNOSIS — E78.49 OTHER HYPERLIPIDEMIA: ICD-10-CM

## 2021-01-26 DIAGNOSIS — I10 ESSENTIAL HYPERTENSION: ICD-10-CM

## 2021-01-26 DIAGNOSIS — F32.A DEPRESSION, UNSPECIFIED DEPRESSION TYPE: ICD-10-CM

## 2021-01-26 DIAGNOSIS — M17.11 PRIMARY OSTEOARTHRITIS OF RIGHT KNEE: ICD-10-CM

## 2021-01-26 DIAGNOSIS — L98.9 SKIN LESION OF RIGHT LOWER EXTREMITY: ICD-10-CM

## 2021-01-26 DIAGNOSIS — E03.9 ACQUIRED HYPOTHYROIDISM: Primary | ICD-10-CM

## 2021-01-26 DIAGNOSIS — M16.11 PRIMARY OSTEOARTHRITIS OF ONE HIP, RIGHT: ICD-10-CM

## 2021-01-26 DIAGNOSIS — N32.81 OVERACTIVE BLADDER: ICD-10-CM

## 2021-01-26 DIAGNOSIS — R26.2 AMBULATORY DYSFUNCTION: ICD-10-CM

## 2021-01-26 PROCEDURE — 99214 OFFICE O/P EST MOD 30 MIN: CPT | Performed by: FAMILY MEDICINE

## 2021-01-26 RX ORDER — SERTRALINE HYDROCHLORIDE 100 MG/1
TABLET, FILM COATED ORAL
Qty: 135 TABLET | Refills: 2 | Status: SHIPPED | OUTPATIENT
Start: 2021-01-26 | End: 2021-10-05 | Stop reason: SDUPTHER

## 2021-01-26 NOTE — ASSESSMENT & PLAN NOTE
· Sustained during a fall early January  Seems to be healing well  · No signs of infection  · Continue cleansing daily with water and soap  Monitor for drainage/erythema

## 2021-01-26 NOTE — ASSESSMENT & PLAN NOTE
· Continue Atorvastatin 40 mg po qhs  · Last lipid panel done in May 2019 with cholesterol level at 178, triglycerides 80, HDL 75, calculated L dL 87   · Will check lipid panel prior to next appointment

## 2021-01-26 NOTE — ASSESSMENT & PLAN NOTE
· Most recent TSH done in August 2020 stable at 3 37   · contninue Levothyroxine 25 mcg daily  · Will check TSH prior to next visit

## 2021-01-26 NOTE — ASSESSMENT & PLAN NOTE
· Sertraline dose increased to 150 mg daily at last visit  · She states she feels better, admits to having good days and bad days  · Encouraged one more time to engage in activities, exercise regularly, stay socially and mentally active

## 2021-01-26 NOTE — ASSESSMENT & PLAN NOTE
· BP stable and well controlled  · Continue Benazepril  · Check BMP prior to next appointment to monitor renal function and electrolytes

## 2021-01-26 NOTE — PROGRESS NOTES
Leslie Olivera 4/9/1931 female MRN: [id number]    Geriatric Medicine Follow-up Visit    ASSESSMENT/PLAN  Acquired hypothyroidism  · Most recent TSH done in August 2020 stable at 3 37   · contninue Levothyroxine 25 mcg daily  · Will check TSH prior to next visit  Essential hypertension  · BP stable and well controlled  · Continue Benazepril  · Check BMP prior to next appointment to monitor renal function and electrolytes  Primary osteoarthritis of one hip, right  · Had bursitis of greater trochanter  Underwent [facility] per ortho in November 2020 with significant improvement  · Was previously referred to PT for further therapy to help ambulatory dysfunction and arthralgias, however PT did not start  · Will refer to PT again  Primary osteoarthritis of right knee  · Underwent [facility] per ortho in November 2020 with improviement  · Continue tylenol, educated regarding max dose of 3, 000 mg per day  · Encouraged to resume PT   · Referral for PT placed  Overactive bladder  · Taking Myrbetriq  · Continues with complaints and again declines Uro-Gyn referral      Depression  · Sertraline dose increased to 150 mg daily at last visit  · She states she feels better, admits to having good days and bad days  · Encouraged one more time to engage in activities, exercise regularly, stay socially and mentally active  Other hyperlipidemia  · Continue Atorvastatin 40 mg po qhs  · Last lipid panel done in May 2019 with cholesterol level at 178, triglycerides 80, HDL 75, calculated L dL 87   · Will check lipid panel prior to next appointment  Ambulatory dysfunction  · With recurrent falls and near falls  · Pain sometimes limits her activity  Not seen by PT at last visit  · Recommend re-evaluation by PT  · We discussed at length her functional status, I have expressed my concerns regarding [place] ability [place]  · Will make another referral to PT at the facility     · Encouraged to stay physically active as possible  Skin lesion of right lower extremity  · Sustained during a fall early January  Seems to be healing well  · No signs of infection  · Continue cleansing daily with water and soap  Monitor for drainage/erythema  No future appointments  SUBJECTIVE  CC: Follow-up (Pt here for 4mth routine)      HPI:  Eugenio Moon is a 80 y o  female who presents for follow up on chronic conditions  She has underlying ambulatory dysfunction with recurrent falls, R hip and knee OA  Last reported fall was 01/04/2021, stress urinary incontinence, hypertension, hypothyroidism  She presents today for follow up  States after previous visit in November, she was told she did not qualify for PT and never started program      She has continued to experience near falls and is afraid  States the mechanism of falls is unknown, sometimes her R knee gives out  She has seen Dr Brenda Middleton of Ortho, received steroid injections to both R hip and R knee in November 2020, states R knee pain improved significantly after injection, her R hip pain has persisted  Last TSH done in August 2020 was stable at 3 37  She reports being compliant with medications  Admits to a very sedentary life, she sits around a lot, reads, watches TV a lot  She otherwise reports feeling well  Denies any fever/chills, chest pain/shortness of breath, abdominal discomfort, nausea/vomiting, diarrhea/constipation or dysuria  Review of Systems   Constitutional: Negative for activity change, appetite change, fatigue, fever and unexpected weight change  HENT: Negative for congestion, dental problem, hearing loss and trouble swallowing  Eyes: Negative for visual disturbance  Respiratory: Negative for apnea, cough, choking, chest tightness and shortness of breath  Cardiovascular: Negative for chest pain, palpitations and leg swelling     Gastrointestinal: Negative for abdominal distention, abdominal pain, constipation, diarrhea, nausea and vomiting  Genitourinary: Negative for difficulty urinating, dysuria and flank pain  Musculoskeletal: Positive for arthralgias and gait problem  Skin: Negative for rash and wound  Psychiatric/Behavioral: Negative for agitation, behavioral problems, confusion and sleep disturbance  All other systems reviewed and are negative  Historical Information   The patient history was reviewed as follows:    Past Medical History:   Diagnosis Date    Disorder of bone     Edema     Essential hypertension     Hyperlipemia     Hyperlipidemia     Hypertension      No past surgical history on file  Family History   Family history unknown: Yes      Social History   Social History     Substance and Sexual Activity   Alcohol Use Yes    Alcohol/week: 7 0 standard drinks    Types: 7 Glasses of wine per week     Social History     Substance and Sexual Activity   Drug Use No     Social History     Tobacco Use   Smoking Status Former Smoker   Smokeless Tobacco Never Used       Medications:     Current Outpatient Medications:     amLODIPine (NORVASC) 5 mg tablet, TAKE 1 TABLET DAILY, Disp: 90 tablet, Rfl: 3    aspirin 81 MG tablet, Take 81 mg by mouth, Disp: , Rfl:     atorvastatin (LIPITOR) 40 mg tablet, TAKE 1 TABLET DAILY, Disp: 90 tablet, Rfl: 3    benazepril (LOTENSIN) 20 mg tablet, TAKE 1 TABLET DAILY, Disp: 90 tablet, Rfl: 3    Calcium Carbonate-Vit D-Min (CALCIUM 1200 PO), Take by mouth Take 2 tablets daily, Disp: , Rfl:     cholecalciferol (VITAMIN D3) 1,000 units tablet, Take 1,000 Units by mouth daily, Disp: , Rfl:     levothyroxine (Synthroid) 25 mcg tablet, Take 1 5 tablets (37 5 mcg total) by mouth daily, Disp: 135 tablet, Rfl: 3    MYRBETRIQ 50 MG TB24, TAKE 1 TABLET DAILY, Disp: 90 tablet, Rfl: 4    sertraline (ZOLOFT) 100 mg tablet, Take 1 5 tablet daily  , Disp: 135 tablet, Rfl: 2  No Known Allergies    OBJECTIVE    Vitals:   Vitals: 01/26/21 1345   BP: 120/58   BP Location: Right arm   Patient Position: Sitting   Cuff Size: Standard   Pulse: 71   Resp: 18   Temp: (!) 97 2 °F (36 2 °C)   TempSrc: Temporal   SpO2: 96%           Physical Exam  Constitutional:       General: She is not in acute distress  Appearance: She is well-developed  She is not diaphoretic  HENT:      Head: Normocephalic and atraumatic  Right Ear: External ear normal       Left Ear: External ear normal       Mouth/Throat:      Pharynx: No oropharyngeal exudate  Eyes:      General: No scleral icterus  Right eye: No discharge  Left eye: No discharge  Conjunctiva/sclera: Conjunctivae normal       Pupils: Pupils are equal, round, and reactive to light  Neck:      Vascular: No JVD  Trachea: No tracheal deviation  Cardiovascular:      Rate and Rhythm: Normal rate and regular rhythm  Heart sounds: Normal heart sounds  No murmur  No friction rub  No gallop  Pulmonary:      Effort: No respiratory distress  Breath sounds: Normal breath sounds  Abdominal:      General: Bowel sounds are normal  There is no distension  Palpations: Abdomen is soft  Tenderness: There is no abdominal tenderness  Musculoskeletal:         General: No tenderness or deformity  Skin:     General: Skin is warm and dry  Coloration: Skin is not pale  Findings: No erythema or rash  Comments: 3 cm long lesion over pretibial region on RLE  No warmth/tenderness to palpation  Neurological:      General: No focal deficit present  Mental Status: She is oriented to person, place, and time  Cranial Nerves: No cranial nerve deficit  Psychiatric:         Mood and Affect: Mood normal          Behavior: Behavior normal          Thought Content:  Thought content normal          Judgment: Judgment normal                     1/26/2021

## 2021-01-26 NOTE — ASSESSMENT & PLAN NOTE
· Had bursitis of greater trochanter  Underwent [facility] per ortho in November 2020 with significant improvement  · Was previously referred to PT for further therapy to help ambulatory dysfunction and arthralgias, however PT did not start  · Will refer to PT again

## 2021-01-26 NOTE — ASSESSMENT & PLAN NOTE
· Underwent [facility] per ortho in November 2020 with improviement  · Continue tylenol, educated regarding max dose of 3, 000 mg per day  · Encouraged to resume PT   · Referral for PT placed

## 2021-01-26 NOTE — ASSESSMENT & PLAN NOTE
· With recurrent falls and near falls  · Pain sometimes limits her activity  Not seen by PT at last visit  · Recommend re-evaluation by PT  · We discussed at length her functional status, I have expressed my concerns regarding [place] ability to [place]  · Will make another referral to PT at the facility  · Encouraged to stay physically active as possible

## 2021-02-09 ENCOUNTER — IMMUNIZATIONS (OUTPATIENT)
Dept: FAMILY MEDICINE CLINIC | Facility: HOSPITAL | Age: 86
End: 2021-02-09

## 2021-02-09 DIAGNOSIS — Z23 ENCOUNTER FOR IMMUNIZATION: Primary | ICD-10-CM

## 2021-02-09 PROCEDURE — 91301 SARS-COV-2 / COVID-19 MRNA VACCINE (MODERNA) 100 MCG: CPT

## 2021-02-09 PROCEDURE — 0011A SARS-COV-2 / COVID-19 MRNA VACCINE (MODERNA) 100 MCG: CPT

## 2021-02-11 DIAGNOSIS — N32.81 OVERACTIVE BLADDER: ICD-10-CM

## 2021-02-15 ENCOUNTER — TELEPHONE (OUTPATIENT)
Dept: GERIATRICS | Facility: OTHER | Age: 86
End: 2021-02-15

## 2021-02-15 RX ORDER — MIRABEGRON 50 MG/1
TABLET, FILM COATED, EXTENDED RELEASE ORAL
Qty: 90 TABLET | Refills: 3 | Status: SHIPPED | OUTPATIENT
Start: 2021-02-15 | End: 2022-04-01 | Stop reason: SDUPTHER

## 2021-02-15 NOTE — TELEPHONE ENCOUNTER
Patient report that she had another fall x 2wks ago  She said she was going to take a shower when she fell  She decline OV today for further eval but will contact the office with any changes

## 2021-03-09 ENCOUNTER — IMMUNIZATIONS (OUTPATIENT)
Dept: FAMILY MEDICINE CLINIC | Facility: HOSPITAL | Age: 86
End: 2021-03-09

## 2021-03-09 DIAGNOSIS — Z23 ENCOUNTER FOR IMMUNIZATION: Primary | ICD-10-CM

## 2021-03-09 PROCEDURE — 91301 SARS-COV-2 / COVID-19 MRNA VACCINE (MODERNA) 100 MCG: CPT

## 2021-03-09 PROCEDURE — 0012A SARS-COV-2 / COVID-19 MRNA VACCINE (MODERNA) 100 MCG: CPT

## 2021-03-29 DIAGNOSIS — E03.9 ACQUIRED HYPOTHYROIDISM: ICD-10-CM

## 2021-03-29 RX ORDER — LEVOTHYROXINE SODIUM 0.03 MG/1
TABLET ORAL
Qty: 135 TABLET | Refills: 3 | Status: SHIPPED | OUTPATIENT
Start: 2021-03-29 | End: 2022-03-01

## 2021-05-04 ENCOUNTER — OFFICE VISIT (OUTPATIENT)
Dept: GERIATRICS | Facility: OTHER | Age: 86
End: 2021-05-04
Payer: MEDICARE

## 2021-05-04 VITALS
SYSTOLIC BLOOD PRESSURE: 136 MMHG | WEIGHT: 152 LBS | BODY MASS INDEX: 26.93 KG/M2 | HEART RATE: 68 BPM | DIASTOLIC BLOOD PRESSURE: 64 MMHG | RESPIRATION RATE: 18 BRPM | OXYGEN SATURATION: 93 %

## 2021-05-04 DIAGNOSIS — I10 ESSENTIAL HYPERTENSION: ICD-10-CM

## 2021-05-04 DIAGNOSIS — F32.A DEPRESSION, UNSPECIFIED DEPRESSION TYPE: ICD-10-CM

## 2021-05-04 DIAGNOSIS — E03.9 ACQUIRED HYPOTHYROIDISM: ICD-10-CM

## 2021-05-04 DIAGNOSIS — M16.11 PRIMARY OSTEOARTHRITIS OF ONE HIP, RIGHT: ICD-10-CM

## 2021-05-04 DIAGNOSIS — R26.2 AMBULATORY DYSFUNCTION: Primary | ICD-10-CM

## 2021-05-04 DIAGNOSIS — M17.11 PRIMARY OSTEOARTHRITIS OF RIGHT KNEE: ICD-10-CM

## 2021-05-04 DIAGNOSIS — N32.81 OVERACTIVE BLADDER: ICD-10-CM

## 2021-05-04 PROCEDURE — 99214 OFFICE O/P EST MOD 30 MIN: CPT | Performed by: FAMILY MEDICINE

## 2021-05-04 RX ORDER — ACETAMINOPHEN 500 MG
1000 TABLET ORAL EVERY 8 HOURS SCHEDULED
Qty: 540 EACH | Refills: 1 | Status: SHIPPED | OUTPATIENT
Start: 2021-05-04 | End: 2022-05-28

## 2021-05-04 NOTE — ASSESSMENT & PLAN NOTE
· Currently on Sertraline 150 mg daily  · States she is compliant with medication  · Denies feeling hopeless/helpless or sad  Denies SI/HI ideation  · States she is just not a social person and does not enjoy physical activity much  · She states she is lonely  · Does not wish to make medication changes at this time but agreeable to behavioral therapy  · Will consult Behavioral Therapy  · She is to follow up in our office after first appointment

## 2021-05-04 NOTE — PROGRESS NOTES
Kellie Khan 4/9/1931 female MRN: [id number]    Geriatric Medicine Follow-up Visit    ASSESSMENT/PLAN  Depression  · Currently on Sertraline 150 mg daily  · States she is compliant with medication  · Denies feeling hopeless/helpless or sad  Denies SI/HI ideation  · States she is just not a social person and does not enjoy physical activity much  · She states she is lonely  · Does not wish to make medication changes at this time but agreeable to behavioral therapy  · Will consult Behavioral Therapy  · She is to follow up in our office after first appointment  Primary osteoarthritis of right knee  · Limiting her ambulation significantly  · We had a long conversation today regarding benefits of PT and pain management  · She has agreed to return to Dr Pancho Lee office of Ortho for repeat steroid injection  · Declines PT again at this time, states she is not compliant with home exercises  · Strongly encouraged to consider PT, as her ambulation will only continue to decline if she remains sedentary  Primary osteoarthritis of one hip, right  · Patient received [facility] per Ortho in November 2020 with significant improvement  · Patient continues to decline PT referral at this time  · Strongly encouraged to re-consider  · Will increase tylenol to 1000 mg Q8 hrs add voltaren gel to R hip and R knee QID  Ambulatory dysfunction  · With recurrent falls and near falls  · We discussed role of PT and pain management  She declines at this time  · I have again expressed concerns regarding her ability to [place] safely  · Encouraged to reconsider PT referral as well as pain management and Ortho  · Encouraged to stay physically active  Overactive bladder  · Unchanged  · Continues to take myrbetriq  · Continues to decline Uro-GYn          Future Appointments   Date Time Provider Chidi Sandhu   5/17/2021  3:00 PM Dayana Lawson [facility]   5/25/2021 2:00 PM Ashlyn Richards MD [facility]          SUBJECTIVE  CC: Follow-up (Review labs)      HPI:  Mejia Mukherjee is a 80 y o  female who presents for follow up on chronic conditions  She has underlying ambulatory dysfunction with recurrent falls, R hip and knee OA, stress urinary incontinence, hypertension, hypothyroidism  She admits to at least 2 falls in the shower since previous visit  States after previous visit in November, she was told she did not qualify for PT and never started program       She has continued to experience near falls and is afraid  States the mechanism of falls is unknown, sometimes her R knee gives out  States her main problem is her R lateral hip and R knee pain, that limits her ambulation greatly       She has seen Dr Amalia Cedeno of Ortho, received steroid injections to both R hip and R knee in November 2020, states R knee pain improved significantly after injection, her R hip pain had persisted  Per Ortho notes, patient was instructed to call for follow up if needed, and otherwise to follow up PRN  Patient admits to being poorly compliant with PT in the past, she remains very sedentary  Has been taking tylenol extra strength, taking 1,000 mg BID PRN but uses it often  She is using Aspercreme to her R lateral hip with mild relief       Last TSH done in the 04/27/2021, very slightly elevated at 4 34 with a free T4 that was normal at 0 88  She reports being compliant with medications  Admits to a very sedentary life, she sits around a lot, reads, watches TV a lot       She otherwise reports feeling well  Denies any fever/chills, chest pain/shortness of breath, abdominal discomfort, nausea/vomiting, diarrhea/constipation or dysuria         Review of Systems   Constitutional: Positive for activity change  Negative for appetite change, fatigue, fever and unexpected weight change     HENT: Negative for congestion, dental problem, hearing loss and trouble swallowing  Eyes: Negative for visual disturbance  Respiratory: Negative for apnea, cough, choking, chest tightness and shortness of breath  Cardiovascular: Negative for chest pain, palpitations and leg swelling  Gastrointestinal: Negative for abdominal distention, abdominal pain, constipation, diarrhea, nausea and vomiting  Genitourinary: Negative for difficulty urinating, dysuria and flank pain  Musculoskeletal: Positive for arthralgias and gait problem  Skin: Negative for rash and wound  Neurological: Positive for weakness  Psychiatric/Behavioral: Negative for agitation, behavioral problems, confusion and sleep disturbance  All other systems reviewed and are negative  Historical Information   The patient history was reviewed as follows:    Past Medical History:   Diagnosis Date    Disorder of bone     Edema     Essential hypertension     Hyperlipemia     Hyperlipidemia     Hypertension      No past surgical history on file    Family History   Family history unknown: Yes      Social History   Social History     Substance and Sexual Activity   Alcohol Use Yes    Alcohol/week: 7 0 standard drinks    Types: 7 Glasses of wine per week     Social History     Substance and Sexual Activity   Drug Use No     Social History     Tobacco Use   Smoking Status Former Smoker   Smokeless Tobacco Never Used       Medications:     Current Outpatient Medications:     amLODIPine (NORVASC) 5 mg tablet, TAKE 1 TABLET DAILY, Disp: 90 tablet, Rfl: 3    aspirin 81 MG tablet, Take 81 mg by mouth, Disp: , Rfl:     atorvastatin (LIPITOR) 40 mg tablet, TAKE 1 TABLET DAILY, Disp: 90 tablet, Rfl: 3    benazepril (LOTENSIN) 20 mg tablet, TAKE 1 TABLET DAILY, Disp: 90 tablet, Rfl: 3    Calcium Carbonate-Vit D-Min (CALCIUM 1200 PO), Take by mouth Take 2 tablets daily, Disp: , Rfl:     cholecalciferol (VITAMIN D3) 1,000 units tablet, Take 1,000 Units by mouth daily, Disp: , Rfl:     levothyroxine 25 mcg tablet, TAKE ONE AND ONE-HALF TABLETS DAILY, Disp: 135 tablet, Rfl: 3    Myrbetriq 50 MG TB24, TAKE 1 TABLET DAILY, Disp: 90 tablet, Rfl: 3    sertraline (ZOLOFT) 100 mg tablet, Take 1 5 tablet daily  , Disp: 135 tablet, Rfl: 2    acetaminophen (TYLENOL) 500 mg tablet, Take 2 tablets (1,000 mg total) by mouth every 8 (eight) hours, Disp: 540 each, Rfl: 1    Diclofenac Sodium (VOLTAREN) 1 %, Apply 4 g topically 4 (four) times a day Apply to R knee and R hip 3-4 times per day , Disp: 350 g, Rfl: 1  No Known Allergies    OBJECTIVE    Vitals:   Vitals:    05/04/21 1406   BP: 136/64   BP Location: Right arm   Patient Position: Sitting   Cuff Size: Standard   Pulse: 68   Resp: 18   SpO2: 93%   Weight: 68 9 kg (152 lb)           Physical Exam  Vitals signs reviewed  Constitutional:       General: She is not in acute distress  Appearance: She is well-developed  She is not diaphoretic  HENT:      Head: Normocephalic and atraumatic  Right Ear: External ear normal       Left Ear: External ear normal       Mouth/Throat:      Pharynx: No oropharyngeal exudate  Eyes:      General: No scleral icterus  Extraocular Movements: Extraocular movements intact  Conjunctiva/sclera: Conjunctivae normal       Pupils: Pupils are equal, round, and reactive to light  Neck:      Vascular: No JVD  Trachea: No tracheal deviation  Cardiovascular:      Rate and Rhythm: Normal rate and regular rhythm  Heart sounds: Normal heart sounds  No murmur  No friction rub  No gallop  Pulmonary:      Effort: No respiratory distress  Breath sounds: Normal breath sounds  Abdominal:      General: Bowel sounds are normal  There is no distension  Palpations: Abdomen is soft  Tenderness: There is no abdominal tenderness  Musculoskeletal:         General: No swelling or deformity  Skin:     General: Skin is warm and dry  Coloration: Skin is not pale  Findings: No erythema or rash  Neurological:      Mental Status: She is alert and oriented to person, place, and time  Cranial Nerves: No cranial nerve deficit  Gait: Gait abnormal (antalgic gait favoring RLE  ambulates with walker    unsteady  )

## 2021-05-04 NOTE — PATIENT INSTRUCTIONS
1) start taking tylenol, 1,000 mg every 8 hours around the clock  2) Start Voltaren Gel, apply 4 g to your R hip and R knee 3 times per day  3) Please consider another trial of physical therapy to help your strength and balance  We can refer to [facility]'s PT  Call my office if you decide to move forward with it  4) please follow up with Dr Ynes Calles of Ortho

## 2021-05-05 NOTE — ASSESSMENT & PLAN NOTE
· Limiting her ambulation significantly  · We had a long conversation today regarding benefits of PT and pain management  · She has agreed to return to Dr Naeem Spring office of Ortho for repeat steroid injection  · Declines PT again at this time, states she is not compliant with home exercises  · Strongly encouraged to consider PT, as her ambulation will only continue to decline if she remains sedentary

## 2021-05-05 NOTE — ASSESSMENT & PLAN NOTE
· With recurrent falls and near falls  · We discussed role of PT and pain management  She declines at this time  · I have again expressed concerns regarding her ability to stay [place]  · Encouraged to reconsider PT referral as well as pain management and Ortho  · Encouraged to stay physically active

## 2021-05-05 NOTE — ASSESSMENT & PLAN NOTE
· Patient received SJI per Ortho in November 2020 with significant improvement  · Patient continues to decline PT referral at this time  · Strongly encouraged to re-consider  · Will increase tylenol to 1000 mg Q8 hrs add voltaren gel to R hip and R knee QID

## 2021-05-17 ENCOUNTER — OFFICE VISIT (OUTPATIENT)
Dept: OBGYN CLINIC | Facility: CLINIC | Age: 86
End: 2021-05-17
Payer: MEDICARE

## 2021-05-17 VITALS — WEIGHT: 152 LBS | BODY MASS INDEX: 26.93 KG/M2 | HEIGHT: 63 IN

## 2021-05-17 DIAGNOSIS — M16.11 PRIMARY OSTEOARTHRITIS OF ONE HIP, RIGHT: ICD-10-CM

## 2021-05-17 DIAGNOSIS — M70.61 TROCHANTERIC BURSITIS OF RIGHT HIP: ICD-10-CM

## 2021-05-17 DIAGNOSIS — M17.11 PRIMARY OSTEOARTHRITIS OF RIGHT KNEE: Primary | ICD-10-CM

## 2021-05-17 PROCEDURE — 99213 OFFICE O/P EST LOW 20 MIN: CPT | Performed by: ORTHOPAEDIC SURGERY

## 2021-05-17 PROCEDURE — 20610 DRAIN/INJ JOINT/BURSA W/O US: CPT | Performed by: ORTHOPAEDIC SURGERY

## 2021-05-17 RX ORDER — LIDOCAINE HYDROCHLORIDE 10 MG/ML
1 INJECTION, SOLUTION INFILTRATION; PERINEURAL
Status: COMPLETED | OUTPATIENT
Start: 2021-05-17 | End: 2021-05-17

## 2021-05-17 RX ORDER — BUPIVACAINE HYDROCHLORIDE 2.5 MG/ML
2 INJECTION, SOLUTION INFILTRATION; PERINEURAL
Status: COMPLETED | OUTPATIENT
Start: 2021-05-17 | End: 2021-05-17

## 2021-05-17 RX ORDER — BUPIVACAINE HYDROCHLORIDE 2.5 MG/ML
1 INJECTION, SOLUTION EPIDURAL; INFILTRATION; INTRACAUDAL
Status: COMPLETED | OUTPATIENT
Start: 2021-05-17 | End: 2021-05-17

## 2021-05-17 RX ORDER — BETAMETHASONE SODIUM PHOSPHATE AND BETAMETHASONE ACETATE 3; 3 MG/ML; MG/ML
6 INJECTION, SUSPENSION INTRA-ARTICULAR; INTRALESIONAL; INTRAMUSCULAR; SOFT TISSUE
Status: COMPLETED | OUTPATIENT
Start: 2021-05-17 | End: 2021-05-17

## 2021-05-17 RX ADMIN — BUPIVACAINE HYDROCHLORIDE 1 ML: 2.5 INJECTION, SOLUTION EPIDURAL; INFILTRATION; INTRACAUDAL at 15:30

## 2021-05-17 RX ADMIN — BETAMETHASONE SODIUM PHOSPHATE AND BETAMETHASONE ACETATE 6 MG: 3; 3 INJECTION, SUSPENSION INTRA-ARTICULAR; INTRALESIONAL; INTRAMUSCULAR; SOFT TISSUE at 15:30

## 2021-05-17 RX ADMIN — LIDOCAINE HYDROCHLORIDE 1 ML: 10 INJECTION, SOLUTION INFILTRATION; PERINEURAL at 15:30

## 2021-05-17 RX ADMIN — BUPIVACAINE HYDROCHLORIDE 2 ML: 2.5 INJECTION, SOLUTION INFILTRATION; PERINEURAL at 15:30

## 2021-05-17 NOTE — PROGRESS NOTES
Assessment:  1  Primary osteoarthritis of right knee     2  Primary osteoarthritis of one hip, right     3  Trochanteric bursitis of right hip       Patient Active Problem List   Diagnosis    Stress incontinence of urine    Essential hypertension    AAA (abdominal aortic aneurysm) without rupture ([facility] [address] )    Depression    Other hyperlipidemia    Right hip pain    Ambulatory dysfunction    Overactive bladder    Acquired hypothyroidism    Osteopenia after menopause    Primary osteoarthritis of one hip, right    Greater trochanteric bursitis of right hip    Primary osteoarthritis of right knee    Skin lesion of right lower extremity           Plan        Cortisone injection into the right knee we will also order lubricant injections for the right knee   Cortisone injection to the greater trochanteric bursal region right hip  We talked about intra-articular hip injections if the hip injection does not work for her she will call us and we will send her to interventional radiology for an intra-articular hip injection  Also talked about hip replacement surgery she is concerned because she keeps falling from the knee issue if we can resolve the knee issue we may need to consider hip replacement but we also need to make sure that she is medically optimized for that surgery and realizes the risks and benefits  Subjective:     Patient ID:    Chief Complaint:Jolene CELE North 80 y o  female      HPI    Patient comes in today with regards to her hip and right knee  She has been having pain for quite some time we last saw her in November 2020 get her her a injection into the greater trochanteric region and also injection into the knee  She reports that the knee injection helped does not feel that the hip injection helped  She has known severe osteoarthritis of the right hip        The following portions of the patient's history were reviewed and updated as appropriate: allergies, current medications, past family history, past social history, past surgical history and problem list     All organ systems normal    Social History     Socioeconomic History    Marital status: Single     Spouse name: Not on file    Number of children: Not on file    Years of education: Not on file    Highest education level: Not on file   Occupational History    Not on file   Social Needs    Financial resource strain: Not on file    Food insecurity     Worry: Not on file     Inability: Not on file    Transportation needs     Medical: Not on file     Non-medical: Not on file   Tobacco Use    Smoking status: Former Smoker    Smokeless tobacco: Never Used   Substance and Sexual Activity    Alcohol use: Yes     Alcohol/week: 7 0 standard drinks     Types: 7 Glasses of wine per week    Drug use: No    Sexual activity: Not on file   Lifestyle    Physical activity     Days per week: Not on file     Minutes per session: Not on file    Stress: Not on file   Relationships    Social connections     Talks on phone: Not on file     Gets together: Not on file     Attends Judaism service: Not on file     Active member of club or organization: Not on file     Attends meetings of clubs or organizations: Not on file     Relationship status: Not on file    Intimate partner violence     Fear of current or ex partner: Not on file     Emotionally abused: Not on file     Physically abused: Not on file     Forced sexual activity: Not on file   Other Topics Concern    Not on file   Social History Narrative    Not on file     Past Medical History:   Diagnosis Date    Disorder of bone     Edema     Essential hypertension     Hyperlipemia     Hyperlipidemia     Hypertension      No past surgical history on file    No Known Allergies  Current Outpatient Medications on File Prior to Visit   Medication Sig Dispense Refill    acetaminophen (TYLENOL) 500 mg tablet Take 2 tablets (1,000 mg total) by mouth every 8 (eight) hours 540 each 1    amLODIPine (NORVASC) 5 mg tablet TAKE 1 TABLET DAILY 90 tablet 3    aspirin 81 MG tablet Take 81 mg by mouth      atorvastatin (LIPITOR) 40 mg tablet TAKE 1 TABLET DAILY 90 tablet 3    benazepril (LOTENSIN) 20 mg tablet TAKE 1 TABLET DAILY 90 tablet 3    Calcium Carbonate-Vit D-Min (CALCIUM 1200 PO) Take by mouth Take 2 tablets daily      cholecalciferol (VITAMIN D3) 1,000 units tablet Take 1,000 Units by mouth daily      Diclofenac Sodium (VOLTAREN) 1 % Apply 4 g topically 4 (four) times a day Apply to R knee and R hip 3-4 times per day  350 g 1    levothyroxine 25 mcg tablet TAKE ONE AND ONE-HALF TABLETS DAILY 135 tablet 3    Myrbetriq 50 MG TB24 TAKE 1 TABLET DAILY 90 tablet 3    sertraline (ZOLOFT) 100 mg tablet Take 1 5 tablet daily  135 tablet 2     No current facility-administered medications on file prior to visit  Objective:    Large joint arthrocentesis: R knee  Universal Protocol:  Consent given by: patient    Supporting Documentation  Indications: pain   Procedure Details  Location: knee - R knee  Needle size: 22 G  Ultrasound guidance: no  Approach: anterolateral  Medications administered: 1 mL lidocaine 1 %; 6 mg betamethasone acetate-betamethasone sodium phosphate 6 (3-3) mg/mL; 1 mL bupivacaine (PF) 0 25 %      Large joint arthrocentesis: R greater trochanteric bursa  Universal Protocol:  Consent given by: patient    Supporting Documentation  Indications: pain   Procedure Details  Location: hip - R greater trochanteric bursa  Needle size: 22 G  Approach: lateral  Medications administered: 2 mL bupivacaine 0 25 %; 1 mL lidocaine 1 %; 6 mg betamethasone acetate-betamethasone sodium phosphate 6 (3-3) mg/mL    Patient tolerance: patient tolerated the procedure well with no immediate complications            Ortho Exam   no he internal external rotation of the hip  She is pinpoint tender with palpation just inferior to the greater trochanter    Strength is otherwise with thin normal limits  With regards to her knee range of motion strength within normal limits she is tender on the medial lateral joint line  Elton's test causes pain but is not specific to the posterior medial posterior lateral joint  She is pinpoint tender underneath the lateral patellar facet patellar grind test is also positive  Bounce test also causes some slight pain as well  I have personally reviewed pertinent films in PACS and my interpretation is Previous x-rays reviewed showing severe osteoarthritis of the right hip  There is also mild moderate arthritis of the right knee       Portions of the record may have been created with voice recognition software   Occasional wrong word or "sound a like" substitutions may have occurred due to the inherent limitations of voice recognition software   Read the chart carefully and recognize, using context, where substitutions have occurred

## 2021-05-25 ENCOUNTER — OFFICE VISIT (OUTPATIENT)
Dept: GERIATRICS | Facility: OTHER | Age: 86
End: 2021-05-25
Payer: MEDICARE

## 2021-05-25 VITALS
DIASTOLIC BLOOD PRESSURE: 64 MMHG | OXYGEN SATURATION: 96 % | SYSTOLIC BLOOD PRESSURE: 140 MMHG | TEMPERATURE: 97 F | HEART RATE: 73 BPM | BODY MASS INDEX: 26.93 KG/M2 | RESPIRATION RATE: 18 BRPM | HEIGHT: 63 IN

## 2021-05-25 DIAGNOSIS — E03.9 ACQUIRED HYPOTHYROIDISM: ICD-10-CM

## 2021-05-25 DIAGNOSIS — R26.2 AMBULATORY DYSFUNCTION: ICD-10-CM

## 2021-05-25 DIAGNOSIS — F32.A DEPRESSION, UNSPECIFIED DEPRESSION TYPE: Primary | ICD-10-CM

## 2021-05-25 DIAGNOSIS — M16.11 PRIMARY OSTEOARTHRITIS OF ONE HIP, RIGHT: ICD-10-CM

## 2021-05-25 DIAGNOSIS — M17.11 PRIMARY OSTEOARTHRITIS OF RIGHT KNEE: ICD-10-CM

## 2021-05-25 DIAGNOSIS — I10 ESSENTIAL HYPERTENSION: ICD-10-CM

## 2021-05-25 PROCEDURE — 99214 OFFICE O/P EST MOD 30 MIN: CPT | Performed by: FAMILY MEDICINE

## 2021-05-25 NOTE — ASSESSMENT & PLAN NOTE
· Limiting her ambulation significantly  · Patient has now seen Dr Jessica Nguyen of Ortho for [facility] with some improvement of her pain  · Reports compliance with Tyelnol and voltaren gel with some improvement as well  · She is willing to revisit the idea of PT once her R hip pain improves  · Will also follow up with Dr Jessica Nguyen for lubricant injections to her R knee

## 2021-05-25 NOTE — ASSESSMENT & PLAN NOTE
·   With recurrent falls and near falls  · Patient has had several courses of physical therapy in the past but fails to keep up a home exercise after discharge  · Again we discussed concerns regarding her ability to stay independent in her independent living apartment safely  · I have strongly encouraged her to reconsider physical therapy course referral     · She has now seen Ortho for her chronic hip and knee pain  · We discussed, as the pain improves we will consider re-consulting physical therapy

## 2021-05-25 NOTE — ASSESSMENT & PLAN NOTE
·   Recently seen by with 0 05/04/2021, underwent SG I     · Patient states her hip pain has remained fairly unchanged  · Per Dr Conner's  Note reviewed, if SJI to R hip not helpful they may consider IR referral for image guided injection  · Continue Tylenol 1000 mg q 8hrs and voltaren gel

## 2021-05-25 NOTE — ASSESSMENT & PLAN NOTE
·   Patient with previous history of severe depression, reports history of ATC about 60 years ago  · She is currently on sertraline 150 mg daily and reports being compliant with medication  · She denies feeling hopeless/ helpless or sad  Denies SI HI ideation  · She is now agreeable to establishing care with Dr Mart Pena of Psychiatry  · Will make referral as Dr Mart Pena comes to the facility

## 2021-05-25 NOTE — PROGRESS NOTES
Chico Villarreal 4/9/1931 female MRN: [id number]    Geriatric Medicine Follow-up Visit    ASSESSMENT/PLAN  Depression  ·   Patient with previous history of severe depression, reports history of ATC about 60 years ago  · She is currently on sertraline 150 mg daily and reports being compliant with medication  · She denies feeling hopeless/ helpless or sad  Denies SI HI ideation  · She is now agreeable to establishing care with Dr John Murray of Psychiatry  · Will make referral as Dr John Murray comes to the facility  Ambulatory dysfunction  ·   With recurrent falls and near falls  · Patient has had several courses of physical therapy in the past but fails to keep up a home exercise after discharge  · Again we discussed concerns regarding her ability to stay independent in her independent living apartment safely  · I have strongly encouraged her to reconsider physical therapy course referral     · She has now seen Ortho for her chronic hip and knee pain  · We discussed, as the pain improves we will consider re-consulting physical therapy  Primary osteoarthritis of one hip, right  ·   Recently seen by with Jayne 05/04/2021, underwent SG I     · Patient states her hip pain has remained fairly unchanged  · Per Dr Conner's  Note reviewed, if SJI to R hip not helpful they may consider IR referral for image guided injection  · Continue Tylenol 1000 mg q 8hrs and voltaren gel  Primary osteoarthritis of right knee  · Limiting her ambulation significantly  · Patient has now seen Dr Katrin Isabel of Ortho for [facility] with some improvement of her pain  · Reports compliance with Tyelnol and voltaren gel with some improvement as well  · She is willing to revisit the idea of PT once her R hip pain improves  · Will also follow up with Dr Katrin Isabel for lubricant injections to her R knee  Essential hypertension  · BP stable and well controlled  · Continue Benazepril  · Reviewed recent BMP, unremarkable  Acquired hypothyroidism  ·  Most recent TSH slightly elevated at 4 34 with normal free T4 at 0 88  · Continue levothyroxine at current dose for now and monitor TSH  Future Appointments   Date Time Provider Chidi Sandhu   6/14/2021  1:15 PM Dayana Lawson, DO ORTHO [facility]   6/15/2021 10:00 AM Isa Devine LCSW [facility]   6/21/2021  1:30 PM Dayana Lawson DO ORTHO AND [facility]   6/28/2021  1:30 PM Dayana Lawson DO ORTHO AND [facility]   7/27/2021  1:00  Vinny Epperson MD [facility]          SUBJECTIVE  CC: Follow-up (review ortho consult )      HPI:  Kellie Khan is a 80 y o  female who presents for follow up after Ortho visit  She recently saw Dr Bishop Campbell for her chronic R hip and R knee pain in the setting of severe OA that is limiting her ambulation/adls  She underwent SJI of both R hip and R knee on 5/4 with some improvement in her R knee pain  States her R hip pain remains unchanged and continues to limit her physical activity  She reports her mood is also interfering with her physical activity, although she denies feeling helpless/hopeless or sad  She denies SI/HI  Otherwise offers no new complaints  Denies any fever/chills, chest pain/shortness of breath, abdominal discomfort, nausea/vomiting, diarrhea/constipation or dysuria  Review of Systems   Constitutional: Positive for activity change  Negative for appetite change, fatigue, fever and unexpected weight change  HENT: Negative for congestion, dental problem, hearing loss and trouble swallowing  Eyes: Negative for visual disturbance  Respiratory: Negative for apnea, cough, choking, chest tightness and shortness of breath  Cardiovascular: Negative for chest pain, palpitations and leg swelling  Gastrointestinal: Negative for abdominal distention, abdominal pain, constipation, diarrhea, nausea and vomiting     Genitourinary: Negative for difficulty urinating, dysuria and flank pain  Musculoskeletal: Positive for arthralgias and gait problem  Skin: Negative for rash and wound  Psychiatric/Behavioral: Negative for agitation, behavioral problems, confusion and sleep disturbance  All other systems reviewed and are negative  Historical Information   The patient history was reviewed as follows:    Past Medical History:   Diagnosis Date    Disorder of bone     Edema     Essential hypertension     Hyperlipemia     Hyperlipidemia     Hypertension      No past surgical history on file    Family History   Family history unknown: Yes      Social History   Social History     Substance and Sexual Activity   Alcohol Use Yes    Alcohol/week: 7 0 standard drinks    Types: 7 Glasses of wine per week     Social History     Substance and Sexual Activity   Drug Use No     Social History     Tobacco Use   Smoking Status Former Smoker   Smokeless Tobacco Never Used       Medications:     Current Outpatient Medications:     acetaminophen (TYLENOL) 500 mg tablet, Take 2 tablets (1,000 mg total) by mouth every 8 (eight) hours, Disp: 540 each, Rfl: 1    amLODIPine (NORVASC) 5 mg tablet, TAKE 1 TABLET DAILY, Disp: 90 tablet, Rfl: 3    aspirin 81 MG tablet, Take 81 mg by mouth, Disp: , Rfl:     atorvastatin (LIPITOR) 40 mg tablet, TAKE 1 TABLET DAILY, Disp: 90 tablet, Rfl: 3    benazepril (LOTENSIN) 20 mg tablet, TAKE 1 TABLET DAILY, Disp: 90 tablet, Rfl: 3    Calcium Carbonate-Vit D-Min (CALCIUM 1200 PO), Take by mouth Take 2 tablets daily, Disp: , Rfl:     cholecalciferol (VITAMIN D3) 1,000 units tablet, Take 1,000 Units by mouth daily, Disp: , Rfl:     Diclofenac Sodium (VOLTAREN) 1 %, Apply 4 g topically 4 (four) times a day Apply to R knee and R hip 3-4 times per day , Disp: 350 g, Rfl: 1    levothyroxine 25 mcg tablet, TAKE ONE AND ONE-HALF TABLETS DAILY, Disp: 135 tablet, Rfl: 3    Myrbetriq 50 MG TB24, TAKE 1 TABLET DAILY, Disp: 90 tablet, Rfl: 3    sertraline (ZOLOFT) 100 mg tablet, Take 1 5 tablet daily  , Disp: 135 tablet, Rfl: 2  No Known Allergies    OBJECTIVE    Vitals:   Vitals:    05/25/21 1417   BP: 140/64   BP Location: Left arm   Patient Position: Sitting   Cuff Size: Standard   Pulse: 73   Resp: 18   Temp: (!) 97 °F (36 1 °C)   TempSrc: Tympanic   SpO2: 96%   Height: 5' 3" (1 6 m)           Physical Exam  Vitals signs reviewed  Constitutional:       General: She is not in acute distress  Appearance: She is well-developed  She is not diaphoretic  HENT:      Head: Normocephalic and atraumatic  Right Ear: External ear normal       Left Ear: External ear normal       Mouth/Throat:      Mouth: Mucous membranes are moist       Pharynx: Oropharynx is clear  No oropharyngeal exudate  Eyes:      General: No scleral icterus  Right eye: No discharge  Left eye: No discharge  Conjunctiva/sclera: Conjunctivae normal       Pupils: Pupils are equal, round, and reactive to light  Neck:      Vascular: No JVD  Trachea: No tracheal deviation  Cardiovascular:      Rate and Rhythm: Normal rate and regular rhythm  Heart sounds: Normal heart sounds  No murmur  No friction rub  No gallop  Pulmonary:      Effort: No respiratory distress  Breath sounds: Normal breath sounds  Abdominal:      General: Bowel sounds are normal  There is no distension  Palpations: Abdomen is soft  Tenderness: There is no abdominal tenderness  Musculoskeletal:         General: No tenderness or deformity  Comments: R knee brace in place  Skin:     Coloration: Skin is not pale  Findings: No erythema or rash  Neurological:      Mental Status: She is oriented to person, place, and time  Cranial Nerves: No cranial nerve deficit                      5/25/2021

## 2021-05-25 NOTE — ASSESSMENT & PLAN NOTE
·  Most recent TSH slightly elevated at 4 34 with normal free T4 at 0 88  · Continue levothyroxine at current dose for now and monitor TSH

## 2021-05-26 ENCOUNTER — TELEPHONE (OUTPATIENT)
Dept: OBGYN CLINIC | Facility: OTHER | Age: 86
End: 2021-05-26

## 2021-05-26 DIAGNOSIS — M17.11 PRIMARY OSTEOARTHRITIS OF RIGHT KNEE: ICD-10-CM

## 2021-05-26 DIAGNOSIS — M16.11 PRIMARY OSTEOARTHRITIS OF ONE HIP, RIGHT: Primary | ICD-10-CM

## 2021-05-26 NOTE — TELEPHONE ENCOUNTER
Patient called in to inform you that the injection from last week is not working  She would like to know what she can do instead?     C/b # [phone] , can leave a message

## 2021-05-26 NOTE — TELEPHONE ENCOUNTER
Patient stated her knees are great it is the hip giving her trouble still  She reports that the hips is not red, hot, or swelling  Asking for the IR injection to be ordered  Please advise

## 2021-05-27 NOTE — NURSING NOTE
Call placed to patient to discuss upcoming  Right hip injection at [facility]  Allergies reviewed  Per patient found out she was allergic to cobalt when allergy tested at her dermatologists office  Verified with patient current anticoagulation medication of ASA 81 mg daily, but not required to stop per Periprocedural Management of Coagulation Status and Hemostasis Risk in Percutaneous Image Guided Procedures  Pre procedure instructions including diet, taking own medications and need for  discussed with patient  Procedure and post procedure expectations and instructions reviewed with the patient  Patient verbalizes understanding  Per patient no further questions at this time  Patient reminded of the location, date and time of the expected procedure  Contact number provided in case patient has any further questions

## 2021-05-30 DIAGNOSIS — I10 ESSENTIAL HYPERTENSION: ICD-10-CM

## 2021-06-01 RX ORDER — BENAZEPRIL HYDROCHLORIDE 20 MG/1
TABLET ORAL
Qty: 90 TABLET | Refills: 3 | Status: SHIPPED | OUTPATIENT
Start: 2021-06-01 | End: 2022-05-04

## 2021-06-02 ENCOUNTER — HOSPITAL ENCOUNTER (OUTPATIENT)
Dept: RADIOLOGY | Facility: HOSPITAL | Age: 86
Discharge: HOME | End: 2021-06-02
Attending: ORTHOPAEDIC SURGERY | Admitting: RADIOLOGY
Payer: MEDICARE

## 2021-06-02 DIAGNOSIS — M16.11 PRIMARY OSTEOARTHRITIS OF ONE HIP, RIGHT: ICD-10-CM

## 2021-06-02 PROCEDURE — 20610 DRAIN/INJ JOINT/BURSA W/O US: CPT

## 2021-06-02 PROCEDURE — 77002 NEEDLE LOCALIZATION BY XRAY: CPT

## 2021-06-02 RX ORDER — BUPIVACAINE HYDROCHLORIDE 2.5 MG/ML
2 INJECTION, SOLUTION EPIDURAL; INFILTRATION; INTRACAUDAL ONCE
Status: COMPLETED | OUTPATIENT
Start: 2021-06-02 | End: 2021-06-02

## 2021-06-02 RX ORDER — METHYLPREDNISOLONE ACETATE 80 MG/ML
80 INJECTION, SUSPENSION INTRA-ARTICULAR; INTRALESIONAL; INTRAMUSCULAR; SOFT TISSUE ONCE
Status: COMPLETED | OUTPATIENT
Start: 2021-06-02 | End: 2021-06-02

## 2021-06-02 RX ORDER — LIDOCAINE HYDROCHLORIDE 10 MG/ML
7 INJECTION, SOLUTION EPIDURAL; INFILTRATION; INTRACAUDAL; PERINEURAL ONCE
Status: COMPLETED | OUTPATIENT
Start: 2021-06-02 | End: 2021-06-02

## 2021-06-02 RX ADMIN — LIDOCAINE HYDROCHLORIDE 7 ML: 10 INJECTION, SOLUTION EPIDURAL; INFILTRATION; INTRACAUDAL at 14:05

## 2021-06-02 RX ADMIN — METHYLPREDNISOLONE ACETATE 80 MG: 80 INJECTION, SUSPENSION INTRA-ARTICULAR; INTRALESIONAL; INTRAMUSCULAR; SOFT TISSUE at 14:05

## 2021-06-02 RX ADMIN — IOHEXOL 3 ML: 300 INJECTION, SOLUTION INTRAVENOUS at 14:05

## 2021-06-02 RX ADMIN — BUPIVACAINE HYDROCHLORIDE 2 ML: 2.5 INJECTION, SOLUTION EPIDURAL; INFILTRATION; INTRACAUDAL; PERINEURAL at 14:05

## 2021-06-14 ENCOUNTER — OFFICE VISIT (OUTPATIENT)
Dept: OBGYN CLINIC | Facility: CLINIC | Age: 86
End: 2021-06-14
Payer: MEDICARE

## 2021-06-14 VITALS
HEART RATE: 69 BPM | BODY MASS INDEX: 26.93 KG/M2 | SYSTOLIC BLOOD PRESSURE: 154 MMHG | DIASTOLIC BLOOD PRESSURE: 73 MMHG | HEIGHT: 63 IN | WEIGHT: 152 LBS

## 2021-06-14 DIAGNOSIS — M17.11 PRIMARY OSTEOARTHRITIS OF RIGHT KNEE: Primary | ICD-10-CM

## 2021-06-14 PROCEDURE — 99212 OFFICE O/P EST SF 10 MIN: CPT | Performed by: ORTHOPAEDIC SURGERY

## 2021-06-14 PROCEDURE — 20610 DRAIN/INJ JOINT/BURSA W/O US: CPT | Performed by: ORTHOPAEDIC SURGERY

## 2021-06-14 RX ORDER — HYALURONATE SODIUM 10 MG/ML
20 SYRINGE (ML) INTRAARTICULAR
Status: COMPLETED | OUTPATIENT
Start: 2021-06-14 | End: 2021-06-14

## 2021-06-14 RX ADMIN — Medication 20 MG: at 13:23

## 2021-06-14 NOTE — PROGRESS NOTES
1  Primary osteoarthritis of right knee  Large joint arthrocentesis     Patient is here for her 1st injection of Euflexxa into the right knee  Patient reports knee pain worse with activity, feels like its going to give out sometimes  Physical exam of the knee shows no effusion no ecchymosis  All other organ systems normal    Large joint arthrocentesis: R knee  Universal Protocol:  Consent given by: patient  Time out: Immediately prior to procedure a "time out" was called to verify the correct patient, procedure, equipment, support staff and site/side marked as required    Site marked: the operative site was marked  Supporting Documentation  Indications: pain   Procedure Details  Location: knee - R knee  Preparation: Patient was prepped and draped in the usual sterile fashion  Needle size: 22 G  Ultrasound guidance: no  Approach: anterolateral  Medications administered: 20 mg Sodium Hyaluronate 20 MG/2ML    Patient tolerance: patient tolerated the procedure well with no immediate complications  Dressing:  Sterile dressing applied          Patient tolerated procedure follow up 1 week

## 2021-06-21 ENCOUNTER — PROCEDURE VISIT (OUTPATIENT)
Dept: OBGYN CLINIC | Facility: CLINIC | Age: 86
End: 2021-06-21
Payer: MEDICARE

## 2021-06-21 VITALS
HEIGHT: 63 IN | WEIGHT: 152 LBS | SYSTOLIC BLOOD PRESSURE: 146 MMHG | DIASTOLIC BLOOD PRESSURE: 70 MMHG | HEART RATE: 73 BPM | BODY MASS INDEX: 26.93 KG/M2

## 2021-06-21 DIAGNOSIS — M17.11 PRIMARY OSTEOARTHRITIS OF RIGHT KNEE: Primary | ICD-10-CM

## 2021-06-21 PROCEDURE — 20610 DRAIN/INJ JOINT/BURSA W/O US: CPT | Performed by: ORTHOPAEDIC SURGERY

## 2021-06-21 RX ORDER — HYALURONATE SODIUM 10 MG/ML
20 SYRINGE (ML) INTRAARTICULAR
Status: COMPLETED | OUTPATIENT
Start: 2021-06-21 | End: 2021-06-21

## 2021-06-21 RX ADMIN — Medication 20 MG: at 13:44

## 2021-06-21 NOTE — PROGRESS NOTES
1  Primary osteoarthritis of right knee       Patient is here for her  2nd injection of  Euflexxa into the right knee  Patient reports some irritation yesterday in today  The hip is also bothering her  All organ systems normal  Physical exam of the knee shows no effusion no ecchymosis        Large joint arthrocentesis: R knee  Universal Protocol:  Consent given by: patient    Supporting Documentation  Indications: pain   Procedure Details  Location: knee - R knee  Needle size: 22 G  Ultrasound guidance: no  Approach: anterolateral  Medications administered: 20 mg Sodium Hyaluronate 20 MG/2ML    Patient tolerance: patient tolerated the procedure well with no immediate complications          Patient tolerated procedure follow up one week

## 2021-06-28 ENCOUNTER — PROCEDURE VISIT (OUTPATIENT)
Dept: OBGYN CLINIC | Facility: CLINIC | Age: 86
End: 2021-06-28
Payer: MEDICARE

## 2021-06-28 VITALS — HEIGHT: 63 IN | BODY MASS INDEX: 26.93 KG/M2 | WEIGHT: 152 LBS

## 2021-06-28 DIAGNOSIS — M17.11 PRIMARY OSTEOARTHRITIS OF RIGHT KNEE: Primary | ICD-10-CM

## 2021-06-28 PROCEDURE — 20610 DRAIN/INJ JOINT/BURSA W/O US: CPT | Performed by: ORTHOPAEDIC SURGERY

## 2021-06-28 RX ORDER — HYALURONATE SODIUM 10 MG/ML
20 SYRINGE (ML) INTRAARTICULAR
Status: COMPLETED | OUTPATIENT
Start: 2021-06-28 | End: 2021-06-28

## 2021-06-28 RX ADMIN — Medication 20 MG: at 13:25

## 2021-06-28 NOTE — PROGRESS NOTES
No diagnosis found  Patient is here for her 3rd injection of Euflexxa into the right knee  Patient reports less stiffness  Physical exam of the knee shows no effusion no ecchymosis  All other organ systems normal    Large joint arthrocentesis: R knee  Universal Protocol:  Consent: Verbal consent obtained  Written consent not obtained  Risks and benefits: risks, benefits and alternatives were discussed  Consent given by: patient  Time out: Immediately prior to procedure a "time out" was called to verify the correct patient, procedure, equipment, support staff and site/side marked as required    Patient understanding: patient states understanding of the procedure being performed  Site marked: the operative site was marked  Patient identity confirmed: verbally with patient    Supporting Documentation  Indications: pain and joint swelling   Procedure Details  Location: knee - R knee  Preparation: Patient was prepped and draped in the usual sterile fashion  Needle size: 22 G  Medications administered: 20 mg Sodium Hyaluronate 20 MG/2ML    Patient tolerance: patient tolerated the procedure well with no immediate complications  Dressing:  Sterile dressing applied          Patient tolerated procedure follow up PRN

## 2021-07-09 ENCOUNTER — TELEPHONE (OUTPATIENT)
Dept: GERIATRICS | Facility: OTHER | Age: 86
End: 2021-07-09

## 2021-07-23 ENCOUNTER — IN HOME VISIT (OUTPATIENT)
Dept: GERIATRICS | Facility: OTHER | Age: 86
End: 2021-07-23
Payer: MEDICARE

## 2021-07-23 DIAGNOSIS — E78.49 OTHER HYPERLIPIDEMIA: ICD-10-CM

## 2021-07-23 DIAGNOSIS — I10 ESSENTIAL HYPERTENSION: Primary | ICD-10-CM

## 2021-07-23 DIAGNOSIS — E03.9 ACQUIRED HYPOTHYROIDISM: ICD-10-CM

## 2021-07-23 DIAGNOSIS — M16.11 PRIMARY OSTEOARTHRITIS OF ONE HIP, RIGHT: ICD-10-CM

## 2021-07-23 DIAGNOSIS — F32.A DEPRESSION, UNSPECIFIED DEPRESSION TYPE: ICD-10-CM

## 2021-07-23 DIAGNOSIS — L98.9 SKIN LESION: ICD-10-CM

## 2021-07-23 DIAGNOSIS — R26.2 AMBULATORY DYSFUNCTION: ICD-10-CM

## 2021-07-23 DIAGNOSIS — N32.81 OVERACTIVE BLADDER: ICD-10-CM

## 2021-07-23 DIAGNOSIS — M17.11 PRIMARY OSTEOARTHRITIS OF RIGHT KNEE: ICD-10-CM

## 2021-07-23 PROCEDURE — 99336 PR DOM/R-HOME E/M EST PT MOD HI SEVERITY 40 MINUTES: CPT | Performed by: NURSE PRACTITIONER

## 2021-07-23 NOTE — PROGRESS NOTES
[address]  Progress Note  POS 13    ASSESSMENT AND PLAN:  1  Essential hypertension  Assessment & Plan:  · BP stable and controlled today at 130/67  · Continue Benazepril 20 mg daily and Amlodipine 5 mg daily  · Will monitor electrolytes and renal function      2  Acquired hypothyroidism  Assessment & Plan:  ·  TSH slightly elevated at 4 34 with normal T4 at 0 88   · Continue levothyroxine 25 mcg p o  daily   · Will repeat TSH with free T4 level      3  Skin lesion  Assessment & Plan:  ·  To suspicious skin lesions   Noted upon examination  One on anterior right shoulder,  The other left distal axilla  · Will consult dermatology to evaluate and treat  · Continue warm compresses multiple times throughout the day to left axilla  4  Overactive bladder  Assessment & Plan:  ·   Unchanged with Myrbetriq  ·   Will hold Myrbetriq for one month to see if there is a change and consider discontinuing Myrbetriq if there is no change  · Patient declined Uro-Gyn in the past  · Continue bladder training exercises      5  Ambulatory dysfunction  Assessment & Plan:  · History of recurrent falls in the past  · Patient had several courses of physical therapies in the past  · Continue home exercises as recommended by Physical therapy  · Will re-consult Physical therapy with ambulation declines  ·  continue OT services for therapeutic activity and ADL training  ·  maintain fall precautions      6  Depression, unspecified depression type  Assessment & Plan:  ·  Mood currently stable   · Continue sertraline 150 mg daily  · Follow-up with Psychiatry, Dr Kimberlee Cabello  · Continue frequent reassurance      7  Other hyperlipidemia  Assessment & Plan:  ·  Stable, continue atorvastatin 40 mg p o  q h s        8  Primary osteoarthritis of right knee  Assessment & Plan:  · Currently stable after Euflexxa injection with Dr Justyn Lowry on 06/28/2021  · Continue tylenol and voltaren gel  · PT services as needed  ·  continue OT services for therapeutic activity and ADL training  · Follow-up with Dr Rozelle Felty      9  Primary osteoarthritis of one hip, right  Assessment & Plan:  · Stable, continue tylenol and voltaren gel  · Follow-up with Dr Rozelle Felty  ·   Continue OT services for therapeutic activity and ADL training            HPI:    We had the pleasure of evaluating Omega Colon who is a 80 y o  female in her personal care living apartment at [facility] to follow-up on acute and chronic medical conditions  Her past medical history includes hypothyroidism, essential hypertension, osteoarthritis, overactive bladder, ambulatory dysfunction, depression, hyperlipidemia  Upon examination, patient is calm, cooperative and in no acute distress  She recently moved from independent living at [facility] to the personal care setting  She states that she is still experiencing frequency of urination and is not sure the the medication that she is on, Myrbetriq, is helping her  Her blood pressures have been stable and controlled, today it is 130/67  She has chronic pain in her right knee and right hip pain  She follows with ortho for Euflexxa injections regularly  Her last injection to her right knee was on 06/28/2021  ROS: Review of Systems   HENT: Positive for hearing loss  Negative for congestion, sinus pressure and sinus pain  Bilateral hearing aids   Eyes: Negative for visual disturbance  Glasses for distance and TV, read without them   Respiratory: Negative for cough, chest tightness, shortness of breath and wheezing  Cardiovascular: Positive for leg swelling  Negative for chest pain and palpitations  Left leg greater than the right leg   Gastrointestinal: Positive for constipation  Negative for abdominal pain, blood in stool, diarrhea, nausea and vomiting  Genitourinary: Positive for frequency  Negative for difficulty urinating, dysuria, flank pain and hematuria          Frequency mostly at night, every 2 hours   Musculoskeletal: Positive for arthralgias, gait problem and myalgias  Negative for back pain, joint swelling, neck pain and neck stiffness  Right knee and hip pain, chronic  Skin: Negative for color change, pallor and rash  Cyst under left arm, painful, worse with movement  Chronic lesion of right shoulder   Neurological: Positive for weakness  Negative for dizziness, speech difficulty, numbness and headaches  Leg weakness   Psychiatric/Behavioral: Positive for confusion and sleep disturbance  Negative for dysphoric mood  The patient is not nervous/anxious  Been more forgetful       Allergies   Allergen Reactions    Cobalt Other (See Comments)     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  Medications:    Current Outpatient Medications on File Prior to Visit   Medication Sig Dispense Refill    acetaminophen (TYLENOL) 500 mg tablet Take 2 tablets (1,000 mg total) by mouth every 8 (eight) hours 540 each 1    amLODIPine (NORVASC) 5 mg tablet TAKE 1 TABLET DAILY 90 tablet 3    aspirin 81 MG tablet Take 81 mg by mouth      atorvastatin (LIPITOR) 40 mg tablet TAKE 1 TABLET DAILY 90 tablet 3    benazepril (LOTENSIN) 20 mg tablet TAKE 1 TABLET DAILY 90 tablet 3    Calcium Carbonate-Vit D-Min (CALCIUM 1200 PO) Take by mouth Take 2 tablets daily      cholecalciferol (VITAMIN D3) 1,000 units tablet Take 1,000 Units by mouth daily      Diclofenac Sodium (VOLTAREN) 1 % Apply 4 g topically 4 (four) times a day Apply to R knee and R hip 3-4 times per day  350 g 1    levothyroxine 25 mcg tablet TAKE ONE AND ONE-HALF TABLETS DAILY 135 tablet 3    Myrbetriq 50 MG TB24 TAKE 1 TABLET DAILY 90 tablet 3    sertraline (ZOLOFT) 100 mg tablet Take 1 5 tablet daily  135 tablet 2     No current facility-administered medications on file prior to visit         History:  Past Medical History:   Diagnosis Date    Disorder of bone     Edema     Essential hypertension     Hyperlipemia     Hyperlipidemia     Hypertension      Past Surgical History:   Procedure Laterality Date    FL INJECTION RIGHT HIP (NON ARTHROGRAM)  6/2/2021     Family History   Family history unknown: Yes     Social History     Socioeconomic History    Marital status: Single     Spouse name: Not on file    Number of children: Not on file    Years of education: Not on file    Highest education level: Not on file   Occupational History    Not on file   Tobacco Use    Smoking status: Former Smoker    Smokeless tobacco: Never Used   Substance and Sexual Activity    Alcohol use: Yes     Alcohol/week: 7 0 standard drinks     Types: 7 Glasses of wine per week    Drug use: No    Sexual activity: Not on file   Other Topics Concern    Not on file   Social History Narrative    Not on file     Social Determinants of Health     Financial Resource Strain:     Difficulty of Paying Living Expenses:    Food Insecurity:     Worried About Running Out of Food in the Last Year:     [address] in the Last Year:    Transportation Needs:     Lack of Transportation (Medical):      Lack of Transportation (Non-Medical):    Physical Activity:     Days of Exercise per Week:     Minutes of Exercise per Session:    Stress:     Feeling of Stress :    Social Connections:     Frequency of Communication with Friends and Family:     Frequency of Social Gatherings with Friends and Family:     Attends Bahai Services:     Active Member of Clubs or Organizations:     Attends Club or Organization Meetings:     Marital Status:    Intimate Partner Violence:     Fear of Current or Ex-Partner:     Emotionally Abused:     Physically Abused:     Sexually Abused:      Past Surgical History:   Procedure Laterality Date    FL INJECTION RIGHT HIP (NON ARTHROGRAM)  6/2/2021       OBJECTIVE:  Vitals:    07/23/21 1000   BP: 130/67   Pulse: 61   Resp: 16   Temp: (!) 96 8 °F (36 °C)     There is no height or weight on file to calculate BMI  Physical Exam  Cardiovascular:      Rate and Rhythm: Normal rate and regular rhythm  Pulses: Normal pulses  Heart sounds: No murmur heard  No friction rub  No gallop  Pulmonary:      Effort: Pulmonary effort is normal  No respiratory distress  Breath sounds: Normal breath sounds  No wheezing or rhonchi  Chest:      Chest wall: No tenderness  Musculoskeletal:      Cervical back: Normal range of motion and neck supple  No rigidity  Lymphadenopathy:      Cervical: No cervical adenopathy  Skin:     Findings: Lesion present  Comments: Red irregular shaped skin lesion of right shoulder  Flesh colored lesion with black scabbed center of left axilla         Labs & Imaging:  Lab Results   Component Value Date    WBC 4 39 10/08/2015    HGB 14 6 10/08/2015    HCT 43 6 10/08/2015    MCV 92 10/08/2015     10/08/2015     Lab Results   Component Value Date    K 3 7 10/08/2015     10/08/2015    CO2 28 10/08/2015    BUN 20 10/08/2015    CREATININE 0 76 10/08/2015    CALCIUM 8 5 10/08/2015     Lab Results   Component Value Date    TJSXIZUF19 483 02/25/2014     Lab Results   Component Value Date    EWX2YVHGSHSZ 5 500 (H) 10/08/2015     No results found for: Eileen Wolfe   Results for orders placed during the hospital encounter of 01/04/21    CT head wo contrast    Narrative  CT BRAIN - WITHOUT CONTRAST    INDICATION:   Head trauma, minor (Age => 65y)  fall  COMPARISON:  December 27, 2017    TECHNIQUE:  CT examination of the brain was performed  In addition to axial images, sagittal and coronal 2D reformatted images were created and submitted for interpretation  Radiation dose length product (DLP) for this visit:  996 32 mGy-cm     This examination, like all CT scans performed in the [facility], was performed utilizing techniques to minimize radiation dose exposure, including the use of iterative  reconstruction and automated exposure control  IMAGE QUALITY:  Diagnostic  FINDINGS:    PARENCHYMA: Decreased attenuation is noted in periventricular and subcortical white matter demonstrating an appearance that is statistically most likely to represent mild microangiopathic change; this appearance is similar when compared to most recent  prior examination  Calcified paraclinoid right meningioma measures 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

## 2021-07-23 NOTE — PATIENT INSTRUCTIONS
1  Hold Myrbetriq for one month  2  Consult [facility] re: skin lesion of right shoulder and left axilla, Dr Imtiaz Posada, [place] office  3  TSH with free T4 level, CBC with platelet, CMP on Monday 07/26/2021  4   Follow-up in one month

## 2021-07-25 VITALS
SYSTOLIC BLOOD PRESSURE: 130 MMHG | HEART RATE: 61 BPM | RESPIRATION RATE: 16 BRPM | TEMPERATURE: 96.8 F | DIASTOLIC BLOOD PRESSURE: 67 MMHG

## 2021-07-25 NOTE — ASSESSMENT & PLAN NOTE
·  Mood currently stable   · Continue sertraline 150 mg daily  · Follow-up with Psychiatry, Dr Sam Guy  · Continue frequent reassurance

## 2021-07-25 NOTE — ASSESSMENT & PLAN NOTE
·  TSH slightly elevated at 4 34 with normal T4 at 0 88   · Continue levothyroxine 25 mcg p o  daily   · Will repeat TSH with free T4 level

## 2021-07-25 NOTE — ASSESSMENT & PLAN NOTE
· Currently stable after Euflexxa injection with Dr Nicole Canada on 06/28/2021  · Continue tylenol and voltaren gel  · PT services as needed  ·  continue OT services for therapeutic activity and ADL training  · Follow-up with Dr Nicole Canada

## 2021-07-25 NOTE — ASSESSMENT & PLAN NOTE
·  To suspicious skin lesions   Noted upon examination  One on anterior right shoulder,  The other left distal axilla  · Will consult dermatology to evaluate and treat  · Continue warm compresses multiple times throughout the day to left axilla

## 2021-07-25 NOTE — ASSESSMENT & PLAN NOTE
· History of recurrent falls in the past  · Patient had several courses of physical therapies in the past  · Continue home exercises as recommended by Physical therapy  · Will re-consult Physical therapy with ambulation declines  ·  continue OT services for therapeutic activity and ADL training  ·  maintain fall precautions

## 2021-07-25 NOTE — ASSESSMENT & PLAN NOTE
· Stable, continue tylenol and voltaren gel  · Follow-up with Dr Pavan Elder  ·   Continue OT services for therapeutic activity and ADL training Mohs Rapid Report Verbiage: The area of clinically evident tumor was marked with skin marking ink and appropriately hatched.  The initial incision was made following the Mohs approach through the skin.  The specimen was taken to the lab, divided into the necessary number of pieces, chromacoded and processed according to the Mohs protocol.  This was repeated in successive stages until a tumor free defect was achieved.

## 2021-07-25 NOTE — ASSESSMENT & PLAN NOTE
·   Unchanged with Myrbetriq  ·   Will hold Myrbetriq for one month to see if there is a change and consider discontinuing Myrbetriq if there is no change  · Patient declined Uro-Gyn in the past  · Continue bladder training exercises

## 2021-07-28 ENCOUNTER — TELEPHONE (OUTPATIENT)
Dept: OTHER | Facility: OTHER | Age: 86
End: 2021-07-28

## 2021-07-28 NOTE — TELEPHONE ENCOUNTER
[phone]/[id number] from [facility] is Sydnee Mustafa  [id number]  Pt fell and has a red lump/bruise on her mid back  Coreen Mueller was paged [phone]    Please allow 20-30 mins for the provider to call you back  If you do not here from them within that timeframe, please call us back

## 2021-08-23 DIAGNOSIS — E87.6 HYPOKALEMIA: Primary | ICD-10-CM

## 2021-08-25 DIAGNOSIS — I10 ESSENTIAL HYPERTENSION: Primary | ICD-10-CM

## 2021-08-25 RX ORDER — POTASSIUM CHLORIDE 20 MEQ/1
TABLET, EXTENDED RELEASE ORAL
COMMUNITY
Start: 2021-07-29 | End: 2021-08-25 | Stop reason: SDUPTHER

## 2021-08-26 RX ORDER — POTASSIUM CHLORIDE 20 MEQ/1
20 TABLET, EXTENDED RELEASE ORAL DAILY
Qty: 30 TABLET | Refills: 0 | Status: SHIPPED | OUTPATIENT
Start: 2021-08-26 | End: 2022-01-10 | Stop reason: SDUPTHER

## 2021-08-26 RX ORDER — POTASSIUM CHLORIDE 20 MEQ/1
TABLET, EXTENDED RELEASE ORAL
Qty: 31 TABLET | Refills: 0 | Status: SHIPPED | OUTPATIENT
Start: 2021-08-26 | End: 2021-11-30 | Stop reason: SDUPTHER

## 2021-09-01 DIAGNOSIS — I10 HYPERTENSION, UNSPECIFIED TYPE: ICD-10-CM

## 2021-09-01 RX ORDER — AMLODIPINE BESYLATE 5 MG/1
TABLET ORAL
Qty: 90 TABLET | Refills: 3 | Status: SHIPPED | OUTPATIENT
Start: 2021-09-01 | End: 2022-04-07 | Stop reason: ALTCHOICE

## 2021-09-14 ENCOUNTER — TELEPHONE (OUTPATIENT)
Dept: GERIATRICS | Facility: OTHER | Age: 86
End: 2021-09-14

## 2021-09-14 NOTE — TELEPHONE ENCOUNTER
Patient's last potassium level is unremarkable  She is not on any potassium lowering medications  She may discontinue

## 2021-09-15 NOTE — TELEPHONE ENCOUNTER
Spoke with patient and relayed message from [facility] and that we will be stopping her potassium medication  Patient was in agreement

## 2021-09-30 DIAGNOSIS — F32.A DEPRESSION, UNSPECIFIED DEPRESSION TYPE: ICD-10-CM

## 2021-10-05 ENCOUNTER — TELEPHONE (OUTPATIENT)
Dept: GERIATRICS | Facility: OTHER | Age: 86
End: 2021-10-05

## 2021-10-05 DIAGNOSIS — F32.A DEPRESSION, UNSPECIFIED DEPRESSION TYPE: ICD-10-CM

## 2021-10-05 RX ORDER — SERTRALINE HYDROCHLORIDE 100 MG/1
TABLET, FILM COATED ORAL
Qty: 135 TABLET | Refills: 3 | Status: SHIPPED | OUTPATIENT
Start: 2021-10-05 | End: 2021-11-21 | Stop reason: SDUPTHER

## 2021-10-14 RX ORDER — SERTRALINE HYDROCHLORIDE 100 MG/1
TABLET, FILM COATED ORAL
Qty: 135 TABLET | Refills: 3 | Status: SHIPPED | OUTPATIENT
Start: 2021-10-14

## 2021-11-19 ENCOUNTER — IN HOME VISIT (OUTPATIENT)
Dept: GERIATRICS | Facility: OTHER | Age: 86
End: 2021-11-19
Payer: MEDICARE

## 2021-11-19 DIAGNOSIS — R60.0 EDEMA OF LEFT LOWER EXTREMITY: Primary | ICD-10-CM

## 2021-11-19 PROCEDURE — 99335 PR DOM/R-HOME E/M EST PT LW MOD SEVERITY 25 MINUTES: CPT | Performed by: NURSE PRACTITIONER

## 2021-11-21 PROBLEM — R60.0 EDEMA OF LEFT LOWER EXTREMITY: Status: ACTIVE | Noted: 2021-11-19

## 2021-11-30 DIAGNOSIS — E87.6 HYPOKALEMIA: ICD-10-CM

## 2021-11-30 RX ORDER — POTASSIUM CHLORIDE 20 MEQ/1
40 TABLET, EXTENDED RELEASE ORAL DAILY
Qty: 31 TABLET | Refills: 0 | Status: SHIPPED | OUTPATIENT
Start: 2021-11-30 | End: 2022-01-10 | Stop reason: ALTCHOICE

## 2021-12-01 DIAGNOSIS — E78.5 HYPERLIPIDEMIA, UNSPECIFIED HYPERLIPIDEMIA TYPE: ICD-10-CM

## 2021-12-01 RX ORDER — ATORVASTATIN CALCIUM 40 MG/1
TABLET, FILM COATED ORAL
Qty: 90 TABLET | Refills: 3 | Status: SHIPPED | OUTPATIENT
Start: 2021-12-01

## 2021-12-10 ENCOUNTER — IN HOME VISIT (OUTPATIENT)
Dept: GERIATRICS | Facility: OTHER | Age: 86
End: 2021-12-10
Payer: MEDICARE

## 2021-12-10 DIAGNOSIS — M79.89 CYST OF SOFT TISSUE: Primary | ICD-10-CM

## 2021-12-10 PROBLEM — M25.512 LEFT SHOULDER PAIN: Status: ACTIVE | Noted: 2021-12-10

## 2021-12-10 PROCEDURE — 99334 PR DOM/R-HOME E/M EST PT SELF-LMTD/MINOR 15 MINUTES: CPT | Performed by: NURSE PRACTITIONER

## 2022-01-10 DIAGNOSIS — E87.6 HYPOKALEMIA: ICD-10-CM

## 2022-01-10 DIAGNOSIS — I10 ESSENTIAL HYPERTENSION: ICD-10-CM

## 2022-01-10 RX ORDER — POTASSIUM CHLORIDE 20 MEQ/1
20 TABLET, EXTENDED RELEASE ORAL DAILY
Qty: 30 TABLET | Refills: 0 | Status: SHIPPED | OUTPATIENT
Start: 2022-01-10

## 2022-03-01 DIAGNOSIS — E03.9 ACQUIRED HYPOTHYROIDISM: ICD-10-CM

## 2022-03-01 RX ORDER — LEVOTHYROXINE SODIUM 0.03 MG/1
TABLET ORAL
Qty: 135 TABLET | Refills: 3 | Status: SHIPPED | OUTPATIENT
Start: 2022-03-01

## 2022-03-10 NOTE — ASSESSMENT & PLAN NOTE
Cardiology Progress Note        Patient: Madie Barriga        Sex: female          DOA: 3/2/2022  YOB: 1940      Age:  80 y.o.        LOS:  LOS: 7 days   Assessment/Plan     Principal Problem:    Acute respiratory failure with hypoxemia ([address] (3/2/2022)    Active Problems:    Elevated troponin (4/23/2021)      HTN (hypertension) (4/23/2021)      Acute on chronic diastolic congestive heart failure (HCC) (4/23/2021)      Atrial fibrillation ([address] (3/2/2022)      Stage 3 chronic kidney disease ([address] (3/3/2022)      SERENA (obstructive sleep apnea) (3/3/2022)      Adult BMI 39.0-39.9 kg/sq m (3/3/2022)      Fracture of neck of right femur ([address] (3/8/2022)        Plan:    Patient denied chest pain or shortness of breath   Atrial fibrillation rate controlled   Patient is scheduled for hip surgery tomorrow  Discussed with patient's daughter  Resume anticoagulation after surgery. Patient is diagnosed with hip fracture and being scheduled for surgery  I have long lengthy discussion with patient and daughter Larue Krabbe in the room  Patient have negative stress test in April 2021  EF is stable  Patient is with elevated but acceptable risk for hip surgery  Eliquis can be hold  Consider DVT prophylaxis anticoagulation from tomorrow patient have taken morning dose of Eliquis      Patient denied any chest pain   Mitral calcification without stenosis  Continue with current medical treatment    Patient continues to do well. H&H stable  Patient will need diuretic on discharge probably Lasix 40 mg twice daily  Discussed with patient and family in the room      A. fib rate controlled  Patient will need p.o.  Lasix on discharge  Continue Eliquis 2.5 mg twice daily  Discussed with patient and family      Continue with Lasix 40 mg twice daily  I have long lengthy discussion with the patient and daughter about anticoagulation both of them agreed with low-dose Eliquis 2.5 mg twice daily  DC · BP stable and controlled today at 130/67  · Continue Benazepril 20 mg daily and Amlodipine 5 mg daily  · Will monitor electrolytes and renal function Lovenox   start Eliquis 2.5 mg twice daily from a.m. Discussed with patient and daughter findings of echocardiogram including mitral valve disease, prefer not to do transesophageal echocardiogram  Continue with current medical treatment                        Subjective:    cc:      Shortness of breath  A. fib        REVIEW OF SYSTEMS:     General: No fevers or chills. Cardiovascular: No chest pain or pressure. No palpitations. mild ankle swelling  Pulmonary: ++SOB, orthopnea, PND  Gastrointestinal: No nausea, vomiting or diarrhea      Objective:      Visit Vitals  BP (!) 92/58   Pulse 80   Temp 97.9 °F (36.6 °C)   Resp 16   Ht 5' 5\" (1.651 m)   Wt 104.2 kg (229 lb 12.8 oz)   SpO2 100%   Breastfeeding No   BMI 38.24 kg/m²     Body mass index is 38.24 kg/m². Physical Exam:  General Appearance: Comfortable, not using accessory muscles of respiration. NECK: No JVD, no thyroidomeglay. LUNGS: Clear bilaterally. HEART: S1 irregular +S2 audible,    ABD: Non-tender, BS Audible    EXT: No edema, and no cysnosis. VASCULAR EXAM: Pulses are intact. PSYCHIATRIC EXAM: Mood is appropriate.     Medication:  Current Facility-Administered Medications   Medication Dose Route Frequency    nystatin (MYCOSTATIN) 100,000 unit/mL oral suspension 500,000 Units  500,000 Units Oral QID    cefTRIAXone (ROCEPHIN) 1 g in 0.9% sodium chloride (MBP/ADV) 50 mL MBP  1 g IntraVENous Q24H    furosemide (LASIX) injection 40 mg  40 mg IntraVENous Q12H    pantoprazole (PROTONIX) tablet 40 mg  40 mg Oral ACB    [Held by provider] apixaban (ELIQUIS) tablet 2.5 mg  2.5 mg Oral BID    sodium chloride (NS) flush 5-40 mL  5-40 mL IntraVENous Q8H    sodium chloride (NS) flush 5-40 mL  5-40 mL IntraVENous PRN    acetaminophen (TYLENOL) tablet 650 mg  650 mg Oral Q6H PRN    Or    acetaminophen (TYLENOL) suppository 650 mg  650 mg Rectal Q6H PRN    polyethylene glycol (MIRALAX) packet 17 g  17 g Oral DAILY PRN    ondansetron (ZOFRAN ODT) tablet 4 mg  4 mg Oral Q8H PRN    Or    ondansetron (ZOFRAN) injection 4 mg  4 mg IntraVENous Q6H PRN    [Held by provider] potassium chloride (K-DUR, KLOR-CON M20) SR tablet 20 mEq  20 mEq Oral DAILY    carvediloL (COREG) tablet 3.125 mg  3.125 mg Oral BID WITH MEALS    aspirin delayed-release tablet 81 mg  81 mg Oral DAILY    acetaminophen (TYLENOL) tablet 650 mg  650 mg Oral Q6H PRN               Lab/Data Reviewed:  Procedures/imaging: see electronic medical records for all procedures/Xrays   and details which were not copied into this note but were reviewed prior to creation of Plan       All lab results for the last 24 hours reviewed.      Recent Labs     03/09/22 1812 03/07/22  0345   WBC 7.7 5.6   HGB 11.8* 11.5*   HCT 40.0 35.1    166     Recent Labs     03/09/22  1812 03/07/22  0345    137   K 4.4 4.4    99*   CO2 41* 33*   * 115*   BUN 64* 69*   CREA 1.39* 1.87*   CA 10.3* 9.8       RADIOLOGY:  CT Results  (Last 48 hours)    None        CXR Results  (Last 48 hours)    None            Cardiology Procedures:   Results for orders placed or performed during the hospital encounter of 03/02/22   EKG, 12 LEAD, INITIAL   Result Value Ref Range    Ventricular Rate 102 BPM    QRS Duration 72 ms    Q-T Interval 336 ms    QTC Calculation (Bezet) 437 ms    Calculated R Axis -81 degrees    Calculated T Axis -20 degrees    Diagnosis       Atrial fibrillation with rapid ventricular response with premature   ventricular or aberrantly conducted complexes  Left axis deviation  Low voltage QRS  Abnormal ECG  When compared with ECG of 23-APR-2021 17:11,  Atrial fibrillation has replaced Sinus rhythm  Confirmed by Jovana Frances MD. ([id number]) on 3/2/2022 10:26:52 PM        Echo Results  (Last 48 hours)    None       Cardiolite (Tc-99m Sestamibi) stress test    Signed By: Patricia Chris MD     March 9, 2022

## 2022-04-01 ENCOUNTER — OFFICE VISIT (OUTPATIENT)
Dept: GERIATRICS | Facility: OTHER | Age: 87
End: 2022-04-01
Payer: MEDICARE

## 2022-04-01 VITALS
HEIGHT: 63 IN | OXYGEN SATURATION: 95 % | RESPIRATION RATE: 16 BRPM | TEMPERATURE: 97.6 F | BODY MASS INDEX: 26.93 KG/M2 | DIASTOLIC BLOOD PRESSURE: 70 MMHG | SYSTOLIC BLOOD PRESSURE: 140 MMHG | HEART RATE: 62 BPM

## 2022-04-01 DIAGNOSIS — M85.80 OSTEOPENIA AFTER MENOPAUSE: ICD-10-CM

## 2022-04-01 DIAGNOSIS — N32.81 OVERACTIVE BLADDER: ICD-10-CM

## 2022-04-01 DIAGNOSIS — R26.2 AMBULATORY DYSFUNCTION: ICD-10-CM

## 2022-04-01 DIAGNOSIS — I10 ESSENTIAL HYPERTENSION: ICD-10-CM

## 2022-04-01 DIAGNOSIS — M25.512 CHRONIC LEFT SHOULDER PAIN: ICD-10-CM

## 2022-04-01 DIAGNOSIS — G89.29 CHRONIC LEFT SHOULDER PAIN: ICD-10-CM

## 2022-04-01 DIAGNOSIS — Z78.0 OSTEOPENIA AFTER MENOPAUSE: ICD-10-CM

## 2022-04-01 DIAGNOSIS — I71.4 AAA (ABDOMINAL AORTIC ANEURYSM) WITHOUT RUPTURE (HCC): ICD-10-CM

## 2022-04-01 DIAGNOSIS — F32.A DEPRESSION, UNSPECIFIED DEPRESSION TYPE: ICD-10-CM

## 2022-04-01 DIAGNOSIS — E03.9 ACQUIRED HYPOTHYROIDISM: ICD-10-CM

## 2022-04-01 DIAGNOSIS — M17.11 PRIMARY OSTEOARTHRITIS OF RIGHT KNEE: Primary | ICD-10-CM

## 2022-04-01 DIAGNOSIS — R60.0 EDEMA OF LEFT FOOT: ICD-10-CM

## 2022-04-01 DIAGNOSIS — E78.49 OTHER HYPERLIPIDEMIA: ICD-10-CM

## 2022-04-01 PROCEDURE — 99214 OFFICE O/P EST MOD 30 MIN: CPT | Performed by: NURSE PRACTITIONER

## 2022-04-01 RX ORDER — MIRABEGRON 50 MG/1
1 TABLET, FILM COATED, EXTENDED RELEASE ORAL DAILY
Qty: 90 TABLET | Refills: 1 | Status: SHIPPED | OUTPATIENT
Start: 2022-04-01

## 2022-04-01 NOTE — PATIENT INSTRUCTIONS
1  Please assist patient in putting compression stockings on daily and off at HS  2  Blood work ordered for Tuesday 04/05/2022  3  Dexa scan ordered to be scheduled at [facility]  4  Abdominal ultrasound routine with [facility] if they do this study  5  Left foot and ankle x-ray routine with [facility] re: left foot/ankle edema  6  Discontinue ASA  7   Follow-up in 3 months

## 2022-04-01 NOTE — PROGRESS NOTES
ASSESSMENT AND PLAN:  1  Primary osteoarthritis of right knee  Assessment & Plan:  · Continue current pain medication regime  · Follow-up with Ortho for Euflexxa injections      2  Chronic left shoulder pain  Assessment & Plan:  · Continue Voltaren gel and stretching exercises  · Will continue to monitor      3  Ambulatory dysfunction  Assessment & Plan:  · History of falls, no recent falls reported  · Continue home exercises as ordered by PT  · Continue assistance with roller walker and wheelchair for mobility  · Maintain fall precautions  4  Overactive bladder  Assessment & Plan:  · Continue Myrbetriq 50 mg daily  · Encourage reduced fluid intake several hours before bedtime    Orders:  -     Mirabegron ER (Myrbetriq) 50 MG TB24; Take 1 tablet (50 mg total) by mouth daily    5  Essential hypertension  Assessment & Plan:  · BP stable and controlled today at 140/70  · Continue Benazepril 20 mg daily and Amlodipine 5 mg daily  · Will monitor electrolytes and renal function    Orders:  -     CBC and differential; Future; Expected date: 04/05/2022  -     Comprehensive metabolic panel; Future; Expected date: 04/05/2022    6  AAA (abdominal aortic aneurysm) without rupture (HCC)  Assessment & Plan:  · Patient declined surveillance in the past   · Patient states that she is now agreeable to an ultrasound  · Will order Abdominal aorta ultrasound    Orders:  -     [address] AAA; Future; Expected date: 04/01/2022    7  Osteopenia after menopause  Assessment & Plan:  · Repeat DXA scan ordered  · Continue calcium and vitamin D    Orders:  -     DXA bone density spine hip and pelvis; Future; Expected date: 04/01/2022    8  Acquired hypothyroidism  Assessment & Plan:  · Continue levothyroxine 25 mcg PO daily  · Repeat TSH with free T4 level ordered    Orders:  -     TSH, 3rd generation with T4 reflex; Future; Expected date: 04/05/2022    9   Depression, unspecified depression type  Assessment & Plan:  · Mood currently stable   · Continue sertraline 150 mg daily  · Follow-up with Psychiatry, Dr Steve Collins  · Continue frequent reassurance and supportive care      10  Other hyperlipidemia  Assessment & Plan:  · Continue statin  · Will monitor lipid panel    Orders:  -     Lipid panel; Future; Expected date: 04/05/2022    11  Edema of left foot  Assessment & Plan:  · Left foot/ankle  x-ray ordered  · Continue leg elevation and compression stockings  · Dopplers 11/20/2021 negative for DVT     Orders:  -     XR foot 3+ vw left; Future; Expected date: 04/01/2022  -     XR ankle 3+ vw left; Future; Expected date: 04/01/2022          HPI:      Mckenzie Dhaliwal is a 80year old female patient of [address] [place] [address] personal care unit seen and examined today in the office to follow-up with acute and chronic medical conditions  Upon examination, patient states that she has chronic pain in her  right hip and knee and some pain on her  left side  She states that she has a cyst in her  left axilla, dermatology states that this cyst is not the cause of the  pain  She complains of left arm pain and rates this pain as an 8/10 with movement  She states that sometimes her  legs bother her in bed  She is using Aspercreme and scheduled Tylenol with mild relief  She is ambulatory and walks to dining room and around unit with a roller walker and long distances with a wheelchair  No recent falls reported  ROS: Review of Systems   Constitutional: Negative for chills, diaphoresis, fatigue and fever  HENT: Negative for congestion, rhinorrhea, sinus pressure and sinus pain  Eyes: Negative for photophobia, pain, discharge, redness, itching and visual disturbance  Respiratory: Negative for cough, chest tightness, shortness of breath and wheezing  Cardiovascular: Positive for leg swelling  Negative for chest pain and palpitations  Gastrointestinal: Negative for abdominal pain, constipation, diarrhea, nausea and vomiting  Genitourinary: Positive for frequency  Negative for dysuria and urgency  Musculoskeletal: Positive for arthralgias, gait problem, joint swelling and myalgias  Negative for back pain, neck pain and neck stiffness  Left ankle swelling   Neurological: Negative for dizziness, weakness, numbness and headaches  Psychiatric/Behavioral: Negative for confusion, dysphoric mood and sleep disturbance  The patient is not nervous/anxious  Allergies   Allergen Reactions    Cobalt Other (See Comments)     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  Medications:    Current Outpatient Medications on File Prior to Visit   Medication Sig Dispense Refill    acetaminophen (TYLENOL) 500 mg tablet Take 2 tablets (1,000 mg total) by mouth every 8 (eight) hours 540 each 1    amLODIPine (NORVASC) 5 mg tablet TAKE 1 TABLET DAILY 90 tablet 3    atorvastatin (LIPITOR) 40 mg tablet TAKE 1 TABLET DAILY 90 tablet 3    benazepril (LOTENSIN) 20 mg tablet TAKE 1 TABLET DAILY 90 tablet 3    Calcium Carbonate-Vit D-Min (CALCIUM 1200 PO) Take by mouth Take 2 tablets daily      cholecalciferol (VITAMIN D3) 1,000 units tablet Take 1,000 Units by mouth daily      levothyroxine 25 mcg tablet TAKE ONE AND ONE-HALF TABLETS DAILY 135 tablet 3    potassium chloride (K-DUR,KLOR-CON) 20 mEq tablet Take 1 tablet (20 mEq total) by mouth daily 30 tablet 0    sertraline (ZOLOFT) 100 mg tablet TAKE ONE AND ONE-HALF TABLETS DAILY 135 tablet 3     No current facility-administered medications on file prior to visit  History:  Past Medical History:   Diagnosis Date    Cyst of soft tissue 12/10/2021    Disorder of bone     Edema     Essential hypertension     Hyperlipemia     Hyperlipidemia     Hypertension     Left shoulder pain 12/10/2021     Past Surgical History:   Procedure Laterality Date    [place] INJECTION RIGHT HIP (NON ARTHROGRAM)  6/2/2021     Family History   Family history unknown:  Yes Social History     Socioeconomic History    Marital status: Single     Spouse name: Not on file    Number of children: Not on file    Years of education: Not on file    Highest education level: Not on file   Occupational History    Not on file   Tobacco Use    Smoking status: Former Smoker    Smokeless tobacco: Never Used   Substance and Sexual Activity    Alcohol use: Yes     Alcohol/week: 7 0 standard drinks     Types: 7 Glasses of wine per week    Drug use: No    Sexual activity: Not on file   Other Topics Concern    Not on file   Social History Narrative    Not on file     Social Determinants of Health     Financial Resource Strain: Not on file   Food Insecurity: Not on file   Transportation Needs: Not on file   Physical Activity: Not on file   Stress: Not on file   Social Connections: Not on file   Intimate Partner Violence: Not on file   Housing Stability: Not on file     Past Surgical History:   Procedure Laterality Date    [place] INJECTION RIGHT HIP (NON ARTHROGRAM)  6/2/2021       OBJECTIVE:  Vitals:    04/01/22 1326   BP: 140/70   BP Location: Left arm   Patient Position: Sitting   Cuff Size: Standard   Pulse: 62   Resp: 16   Temp: 97 6 °F (36 4 °C)   TempSrc: Temporal   SpO2: 95%   Height: 5' 3" (1 6 m)     Body mass index is 26 93 kg/m²  Physical Exam  Constitutional:       General: She is not in acute distress  Appearance: Normal appearance  She is not ill-appearing, toxic-appearing or diaphoretic  HENT:      Head: Normocephalic and atraumatic  Right Ear: Tympanic membrane, ear canal and external ear normal  There is no impacted cerumen  Left Ear: Tympanic membrane, ear canal and external ear normal  There is no impacted cerumen  Nose: Nose normal  No congestion or rhinorrhea  Mouth/Throat:      Mouth: Mucous membranes are moist       Pharynx: Oropharynx is clear  No oropharyngeal exudate or posterior oropharyngeal erythema     Eyes:      General: No scleral icterus  Right eye: No discharge  Left eye: No discharge  Conjunctiva/sclera: Conjunctivae normal       Pupils: Pupils are equal, round, and reactive to light  Cardiovascular:      Rate and Rhythm: Normal rate and regular rhythm  Pulses: Normal pulses  Heart sounds: Normal heart sounds  No murmur heard  No friction rub  No gallop  Pulmonary:      Effort: Pulmonary effort is normal  No respiratory distress  Breath sounds: Normal breath sounds  No wheezing, rhonchi or rales  Abdominal:      General: Bowel sounds are normal  There is no distension  Palpations: Abdomen is soft  There is no mass  Tenderness: There is no abdominal tenderness  There is no guarding  Musculoskeletal:         General: No tenderness, deformity or signs of injury  Normal range of motion  Cervical back: Normal range of motion and neck supple  No rigidity  Right lower leg: Edema present  Left lower leg: Edema present  Skin:     General: Skin is warm and dry  Coloration: Skin is not pale  Findings: No bruising, erythema or rash  Neurological:      General: No focal deficit present  Mental Status: She is alert and oriented to person, place, and time  Cranial Nerves: No cranial nerve deficit  Sensory: No sensory deficit  Motor: No weakness  Gait: Gait abnormal    Psychiatric:         Mood and Affect: Mood normal          Behavior: Behavior normal          Thought Content:  Thought content normal          Judgment: Judgment normal          [employer]agin2021           BASIC METABOLIC PNL              GLUCOSE 88 mg/dL 65-99  Final              BUN 29 mg/dL 7-25 H Final             CREATININE 0 64 mg/dL 0 40-1 10  Final             SODIUM 143 mmol/L 135-145  Final             POTASSIUM 4 1 mmol/L 3 5-5 2  Final             CHLORIDE 106 mmol/L 100-109  Final             CARBON DIOXIDE 31 mmol/L 23-31  Final             CALCIUM 9 8 mg/dL 8  5-10 1  Final             ANION GAP 6  3-11  Final             eGFR, NON  AM 79  >60  Final

## 2022-04-04 NOTE — ASSESSMENT & PLAN NOTE
· History of falls, no recent falls reported  · Continue home exercises as ordered by PT  · Continue assistance with roller walker and wheelchair for mobility  · Maintain fall precautions

## 2022-04-04 NOTE — ASSESSMENT & PLAN NOTE
· BP stable and controlled today at 140/70  · Continue Benazepril 20 mg daily and Amlodipine 5 mg daily  · Will monitor electrolytes and renal function

## 2022-04-04 NOTE — ASSESSMENT & PLAN NOTE
·  Mood currently stable   · Continue sertraline 150 mg daily  · Follow-up with Psychiatry, Dr Laura Bond  · Continue frequent reassurance and supportive care

## 2022-04-04 NOTE — ASSESSMENT & PLAN NOTE
· Patient declined surveillance in the past   · Patient states that she is now agreeable to an ultrasound  · Will order Abdominal aorta ultrasound

## 2022-04-04 NOTE — ASSESSMENT & PLAN NOTE
· Left foot/ankle  x-ray ordered  · Continue leg elevation and compression stockings  · Dopplers 11/20/2021 negative for DVT

## 2022-04-07 ENCOUNTER — IN HOME VISIT (OUTPATIENT)
Dept: GERIATRICS | Facility: OTHER | Age: 87
End: 2022-04-07
Payer: MEDICARE

## 2022-04-07 DIAGNOSIS — E03.9 ACQUIRED HYPOTHYROIDISM: ICD-10-CM

## 2022-04-07 DIAGNOSIS — I71.4 AAA (ABDOMINAL AORTIC ANEURYSM) WITHOUT RUPTURE (HCC): Primary | ICD-10-CM

## 2022-04-07 DIAGNOSIS — I10 ESSENTIAL HYPERTENSION: ICD-10-CM

## 2022-04-07 DIAGNOSIS — E78.49 OTHER HYPERLIPIDEMIA: ICD-10-CM

## 2022-04-07 PROCEDURE — 99336 PR DOM/R-HOME E/M EST PT MOD HI SEVERITY 40 MINUTES: CPT | Performed by: NURSE PRACTITIONER

## 2022-04-07 RX ORDER — ASPIRIN 81 MG/1
81 TABLET, CHEWABLE ORAL DAILY
COMMUNITY

## 2022-04-07 NOTE — ASSESSMENT & PLAN NOTE
· BP stable and controlled at 124/65  · Continue Benazepril at current dose  · Will monitor electrolytes and renal function

## 2022-04-07 NOTE — ASSESSMENT & PLAN NOTE
· Patient declined surveillance in the past   · Patient was agreeable to have an ultrasound which reveals distal aortic  aneurysm at 3 6 cm  · Patient declines vascular surgery consult  · She is agreeable to yearly surveillance  · Will reduce risk factors  · Continue good blood pressure control  · Continue ASA and Statin

## 2022-04-07 NOTE — ASSESSMENT & PLAN NOTE
· TSH unremarkable, T4 slightly decreased  · Will repeat TSH with free T4 level in one month  · Continue levothyroxine 25 mcg daily

## 2022-04-07 NOTE — PROGRESS NOTES
[address]  [address] [facility]  [phone]  [facility]   Progress Note  POS 13       NAME: Eugenio Moon  AGE: 80 y o  SEX: female  :  1931  DATE OF ENCOUNTER: 2022    Chief Complaint   Patient seen and examined to follow-up on AAA, hyperlipidemia,     History of Present Illness     Mckenzie Dhaliwal is a 80year old female seen and examined today to follow-up on lab and imaging results  Upon examination, patient states that she is feeling tired today due to the weather  She is without acute complaint, she denies chest pain, sob, abdominal pain, nausea, vomiting, diarrhea, headache, dizziness  The following portions of the patient's history were reviewed and updated as appropriate: allergies, current medications, past family history, past medical history, past social history, past surgical history and problem list     Review of Systems     A review of systems was performed  All negative, except as per HPI  History     Past Medical History:   Diagnosis Date    Cyst of soft tissue 12/10/2021    Disorder of bone     Edema     Essential hypertension     Hyperlipemia     Hyperlipidemia     Hypertension     Left shoulder pain 12/10/2021     Past Surgical History:   Procedure Laterality Date    FL INJECTION RIGHT HIP (NON ARTHROGRAM)  2021     Family History   Family history unknown: Yes     Social History     Socioeconomic History    Marital status: Single     Spouse name: Not on file    Number of children: Not on file    Years of education: Not on file    Highest education level: Not on file   Occupational History    Not on file   Tobacco Use    Smoking status: Former Smoker    Smokeless tobacco: Never Used   Substance and Sexual Activity    Alcohol use:  Yes     Alcohol/week: 7 0 standard drinks     Types: 7 Glasses of wine per week    Drug use: No    Sexual activity: Not on file   Other Topics Concern    Not on file   Social History Narrative    Not on file     Social Determinants of Health     Financial Resource Strain: Not on file   Food Insecurity: Not on file   Transportation Needs: Not on file   Physical Activity: Not on file   Stress: Not on file   Social Connections: Not on file   Intimate Partner Violence: Not on file   Housing Stability: Not on file     Allergies   Allergen Reactions    Cobalt Other (See Comments)     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          Objective     Vital Signs  BP:  124/65       HR: 69  T: 97 6    RR: 18  O2Sat: 97% RA W: 144 4 lbs   General: NAD, Well Nourished, Well Developed  CV: S1, S2, normal rate, regular rhythm, no murmur appreciated  Pulmonary: Lung sounds clear to air, no wheezing, rhonchi, rales  Abdominal:BS + x4 in all quadrants, soft, no mass, no tenderness  Extremities: + left ankle edema, +ROM, +Strength  Skin: Warm, Dry, no lesions, no rash, no erythema present, no ecchymosis present  Psych: Alert and oriented times 3, no mood, no affect    Pertinent Laboratory/Diagnostic Studies:           CBC WITH DIFF        GLUCOSE 99 mg/dL 65-99  Final             BUN 30 mg/dL 7-25 H Final             CREATININE 0 66 mg/dL 0 40-1 10  Final             SODIUM 145 mmol/L 135-145  Final             POTASSIUM 4 3 mmol/L 3 5-5 2  Final             CHLORIDE 108 mmol/L 100-109  Final             CARBON DIOXIDE 33 mmol/L 23-31 H Final             CALCIUM 9 1 mg/dL 8 5-10 1  Final             ALKALINE PHOSPHATASE 51 U/L   Final             ALBUMIN 3 3 g/dL 3 5-4 8 L Final             BILIRUBIN,TOTAL 0 3 mg/dL 0 2-1 0  Final     Use of this assay is not recommended for patients undergoing treatment   with eltrombopag due to the potential for falsely elevated results         PROTEIN, TOTAL 6 1 g/dL 6 3-8 3 L Final             AST 15 U/L <41  Final             ALT 17 U/L <56  Final             ANION GAP 4  3-11  Final             eGFRcr 83  >59  Final     COMP METAB PANEL        CHOLESTEROL 171 mg/dL <200  Final             TRIGLYCERIDE 84 mg/dL <150  Final             CHOL, HDL, DIRECT 65 mg/dL >40  Final             CHOL, NON- mg/dL <160  Final     Note: For NCEP interpretive guidelines please refer to the Laboratory   Handbook         CHOL, LDL, CALC 89 mg/dL <130  Final     LDL Cholesterol was calculated using the Friedewald equation  Direct   measurement of LDL is not indicated for this patient based on [facility]'s   analytical algorithm for measurement of LDL Cholesterol         CHOL/HDL RATIO 2 63    Final     Relative Risk   1/2 Average Risk          3 27   Average Risk              4 44   2X Average Risk           7 05   3X Average Risk          11 04      THYROID STIM HORMONE 1 99 uIU/mL 0 36-3 74  Final                     DUPLEX SCAN OF AORTA LTD STATUS: Final   INTERPRETATION:  Reason for Study: G89 29 OTHER CHRONIC PAIN  Principal Result InterpreterCaroantoinette Muller ([id number])  Technician: Emerald Conte ([email or web address])  Transcription Technician: AGAPITO    DUPLEX SCAN OF AORTA LTD  FINDINGS: Duplex Aorta Ultrasound Limited: Theproximal aorta is 2 3 cm  The mid aorta is 2 6 cm The distal aorta is 3 6 cm  Thecommon iliac vessels are not dilated  CONCLUSION: Distal aorta aneurysm to 3 6 cm  DANYA Fierro  4/4/2022 1:33:39 PM EDT  PROCEDURE: FOOT AP AND LAT 2V STATUS: Final   INTERPRETATION:  Reason for Study: R60 0 LOCALIZED EDEMA  Principal Result : Cinda Bermeo ([id number])  Technician: Arlen Georges (Maricel Abrams)  Transcription Technician: AGAPITO    FOOT AP and LAT 2V, LEFT  FINDINGS: No acute fracture or dislocation  The osseous structures appear intact  Joint spaces are narrowed  Soft tissues are unremarkable  CONCLUSION: No acute findings  Degenerative changes  Consider a repeat study if symptoms continue to persist or progress    DANYA Santiago  4/1/2022 8:06:28 PM EDT    Stanislav Robini AP AND LAT 2V STATUS: Final   INTERPRETATION:  Reason for Study: R60 0 LOCALIZED EDEMA  Principal Result : Inderjit Osuna ([id number])  Technician: Josie Jerez ([address] [place] [address] Dr)  Transcription Technician: LE    ANKLE AP and LAT 2V, LEFT  FINDINGS: No acute fracture or dislocation  The osseous structures appear intact  Joint spaces are narrowed  Soft tissues are unremarkable  CONCLUSION: No acute findings  Degenerative changes  Consider a repeat study if symptoms continue to persist or progress  DANYA Trevino  4/1/2022 8:06:28 PM EDT  Current Medications     Current Medications Reviewed and updated in Nursing Home EMR      Assessment and Plan     AAA (abdominal aortic aneurysm) without rupture (HCC)  · Patient declined surveillance in the past   · Patient was agreeable to have an ultrasound which reveals distal aortic  aneurysm at 3 6 cm  · Patient declines vascular surgery consult  · She is agreeable to yearly surveillance  · Will reduce risk factors  · Continue good blood pressure control  · Continue ASA and Statin    Essential hypertension  · BP stable and controlled at 124/65  · Continue Benazepril at current dose  · Will monitor electrolytes and renal function    Other hyperlipidemia  · Lipid panel reviewed and is unremarkable  · Continue statin    Acquired hypothyroidism  · TSH unremarkable, T4 slightly decreased  · Will repeat TSH with free T4 level in one month  · Continue levothyroxine 25 mcg daily      [facility]  4/7/2022

## 2022-05-04 DIAGNOSIS — I10 ESSENTIAL HYPERTENSION: ICD-10-CM

## 2022-05-04 RX ORDER — BENAZEPRIL HYDROCHLORIDE 20 MG/1
TABLET ORAL
Qty: 90 TABLET | Refills: 3 | Status: SHIPPED | OUTPATIENT
Start: 2022-05-04

## 2022-05-16 DIAGNOSIS — I10 PRIMARY HYPERTENSION: Primary | ICD-10-CM

## 2022-05-16 RX ORDER — AMLODIPINE BESYLATE 5 MG/1
5 TABLET ORAL DAILY
Qty: 90 TABLET | Refills: 0 | Status: SHIPPED | OUTPATIENT
Start: 2022-05-16

## 2022-05-26 NOTE — ASSESSMENT & PLAN NOTE
-Blood pressure well controlled  -Continue current medications  -reviewed most recent BMP  Renal function electrolytes within normal limits 
-Patient ambulates with a walker at baseline  She admits to poor compliance with home exercises  Denies any recent falls but admits to fear of falling which has made her drop many activities she used to enjoy, such as [employer]  Discussed possibility of resuming PT to work on fear of falling and balance, she declined at this time and wishes to wait to discuss at next visit     -Strongly encouraged to resume home exercises as prescribed by PT 
Patient currently on Detrol daily  She is tolerating it well  States she is having more incontinence overnight with frequent awakenings to void  Willing to try Myrbetriq  Willl stat 25mg po daily, lowest available dose  And follow up in 4 weeks 
moood not great  She thinks she has stopped many activities, mostly because she is afraid of falling  She would be willing to increase dose of zoloft  I have recommended to return to PT to work on her fear of falling  She would like to wait and think about it  Will discuss at next visit 
Xray Chest 2 Views PA/Lat

## 2022-05-28 DIAGNOSIS — M17.11 PRIMARY OSTEOARTHRITIS OF RIGHT KNEE: ICD-10-CM

## 2022-05-28 DIAGNOSIS — M16.11 PRIMARY OSTEOARTHRITIS OF ONE HIP, RIGHT: ICD-10-CM

## 2022-05-28 RX ORDER — ACETAMINOPHEN 500 MG
1000 TABLET ORAL EVERY 12 HOURS
Qty: 540 TABLET | Refills: 1 | Status: SHIPPED | OUTPATIENT
Start: 2022-05-28

## 2022-06-21 ENCOUNTER — IN HOME VISIT (OUTPATIENT)
Dept: GERIATRICS | Facility: OTHER | Age: 87
End: 2022-06-21
Payer: MEDICARE

## 2022-06-21 DIAGNOSIS — M17.11 PRIMARY OSTEOARTHRITIS OF RIGHT KNEE: ICD-10-CM

## 2022-06-21 DIAGNOSIS — R29.898 WEAKNESS OF LEFT LOWER EXTREMITY: Primary | ICD-10-CM

## 2022-06-21 PROCEDURE — 99335 PR DOM/R-HOME E/M EST PT LW MOD SEVERITY 25 MINUTES: CPT | Performed by: NURSE PRACTITIONER

## 2022-06-21 NOTE — PROGRESS NOTES
[address]  [address] [facility]  [phone] [address]   Acute Visit Note  POS 13      NAME: Miguel Reynaga  AGE: 80 y o  SEX: female  :  1931  DATE OF ENCOUNTER: 2022      ASSESSMENT AND PLAN:  1  Weakness of left lower extremity  Assessment & Plan:  · Increased LLE weakness  No recent falls or injury reported  · Will order PT to evaluate and treat  · Maintain fall precautions       2  Primary osteoarthritis of right knee  Assessment & Plan:  · Patient with chronic pain in RLE due to osteoarthritis  · She received steroid injections by Ortho in the past  · Will consult in house pain management physician, Dr Mckeon Learn to evaluate and treat  · Continue current pain medication regime  · PT consult to evaluate and treat          Chief Complaint   Patient seen and examined for increased  weakness of bilateral lower extremities  History of Present Illness     Chinedu Wilson is a 80year old female patient seen and examined today per nursing request for increased bilateral lower extremity weakness, LLE > RLE,  per nursing request   Patient has a history of osteoarthritis of her right knee and has received steroid injections in the past with Ortho  ROS: Review of Systems   Constitutional: Negative for chills, diaphoresis, fatigue and fever  Respiratory: Negative for chest tightness, shortness of breath and wheezing  Cardiovascular: Positive for leg swelling  Negative for chest pain and palpitations  Musculoskeletal: Positive for arthralgias, gait problem and myalgias  Negative for neck pain and neck stiffness  Chronic right lower extremity pain   Skin: Negative for color change, pallor, rash and wound  Neurological: Positive for weakness  Negative for dizziness, numbness and headaches          Bilateral lower extremity weakness, LLE > RLE       Allergies   Allergen Reactions    Cobalt Other (See Comments)     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  Medications:    Current Outpatient Medications on File Prior to Visit   Medication Sig Dispense Refill    acetaminophen (TYLENOL) 500 mg tablet Take 2 tablets (1,000 mg total) by mouth every 12 (twelve) hours 540 tablet 1    amLODIPine (NORVASC) 5 mg tablet Take 1 tablet (5 mg total) by mouth in the morning  90 tablet 0    aspirin 81 mg chewable tablet Chew 81 mg daily      atorvastatin (LIPITOR) 40 mg tablet TAKE 1 TABLET DAILY 90 tablet 3    benazepril (LOTENSIN) 20 mg tablet TAKE 1 TABLET DAILY 90 tablet 3    Calcium Carbonate-Vit D-Min (CALCIUM 1200 PO) Take by mouth Take 2 tablets daily      cholecalciferol (VITAMIN D3) 1,000 units tablet Take 1,000 Units by mouth daily      levothyroxine 25 mcg tablet TAKE ONE AND ONE-HALF TABLETS DAILY 135 tablet 3    Mirabegron ER (Myrbetriq) 50 MG TB24 Take 1 tablet (50 mg total) by mouth daily 90 tablet 1    potassium chloride (K-DUR,KLOR-CON) 20 mEq tablet Take 1 tablet (20 mEq total) by mouth daily 30 tablet 0    sertraline (ZOLOFT) 100 mg tablet TAKE ONE AND ONE-HALF TABLETS DAILY 135 tablet 3     No current facility-administered medications on file prior to visit         History:  Past Medical History:   Diagnosis Date    Cyst of soft tissue 12/10/2021    Disorder of bone     Edema     Essential hypertension     Hyperlipemia     Hyperlipidemia     Hypertension     Left shoulder pain 12/10/2021     Past Surgical History:   Procedure Laterality Date    FL INJECTION RIGHT HIP (NON ARTHROGRAM)  6/2/2021     Family History   Family history unknown: Yes     Social History     Socioeconomic History    Marital status: Single     Spouse name: Not on file    Number of children: Not on file    Years of education: Not on file    Highest education level: Not on file   Occupational History    Not on file   Tobacco Use    Smoking status: Former Smoker    Smokeless tobacco: Never Used   Substance and Sexual Activity    Alcohol use: Yes     Alcohol/week: 7 0 standard drinks     Types: 7 Glasses of wine per week    Drug use: No    Sexual activity: Not on file   Other Topics Concern    Not on file   Social History Narrative    Not on file     Social Determinants of Health     Financial Resource Strain: Not on file   Food Insecurity: Not on file   Transportation Needs: Not on file   Physical Activity: Not on file   Stress: Not on file   Social Connections: Not on file   Intimate Partner Violence: Not on file   Housing Stability: Not on file     Past Surgical History:   Procedure Laterality Date    FL INJECTION RIGHT HIP (NON ARTHROGRAM)  6/2/2021       OBJECTIVE:  /46, HR 69, Temp: 97 0, RR 18, SpO2 95% RA, Wgt: 143 6 lbs    Physical Exam  Constitutional:       General: She is not in acute distress  Appearance: Normal appearance  She is not ill-appearing, toxic-appearing or diaphoretic  HENT:      Right Ear: External ear normal       Left Ear: External ear normal    Cardiovascular:      Rate and Rhythm: Normal rate and regular rhythm  Pulses: Normal pulses  Heart sounds: Normal heart sounds  Pulmonary:      Effort: Pulmonary effort is normal  No respiratory distress  Breath sounds: Normal breath sounds  No wheezing, rhonchi or rales  Musculoskeletal:         General: No tenderness or deformity  Normal range of motion  Right lower leg: Edema present  Left lower leg: Edema present  Skin:     General: Skin is warm and dry  Coloration: Skin is not jaundiced or pale  Findings: No erythema or rash  Neurological:      General: No focal deficit present  Mental Status: She is alert and oriented to person, place, and time  Cranial Nerves: No cranial nerve deficit  Sensory: No sensory deficit  Motor: Weakness present        Gait: Gait abnormal       Comments: Bilateral lower extremity weakness   Psychiatric:         Mood and Affect: Mood normal          Behavior: Behavior normal          Thought Content: Thought content normal          Judgment: Judgment normal            Labs & Imaging:  Reviewed in nursing home EMR

## 2022-06-26 PROBLEM — R29.898 WEAKNESS OF LEFT LOWER EXTREMITY: Status: ACTIVE | Noted: 2022-06-21

## 2022-06-26 NOTE — ASSESSMENT & PLAN NOTE
· Increased LLE weakness   No recent falls or injury reported  · Will order PT to evaluate and treat  · Maintain fall precautions

## 2022-06-26 NOTE — ASSESSMENT & PLAN NOTE
· Patient with chronic pain in RLE due to osteoarthritis  · She received steroid injections by Ortho in the past  · Will consult in house pain management physician, Dr Lenka Bolanos to evaluate and treat  · Continue current pain medication regime  · PT consult to evaluate and treat

## 2022-07-07 ENCOUNTER — OFFICE VISIT (OUTPATIENT)
Dept: GERIATRICS | Facility: OTHER | Age: 87
End: 2022-07-07
Payer: MEDICARE

## 2022-07-07 VITALS
BODY MASS INDEX: 25.23 KG/M2 | OXYGEN SATURATION: 96 % | DIASTOLIC BLOOD PRESSURE: 74 MMHG | HEART RATE: 64 BPM | HEIGHT: 63 IN | WEIGHT: 142.4 LBS | TEMPERATURE: 97.8 F | SYSTOLIC BLOOD PRESSURE: 126 MMHG | RESPIRATION RATE: 16 BRPM

## 2022-07-07 DIAGNOSIS — Z78.0 OSTEOPENIA AFTER MENOPAUSE: ICD-10-CM

## 2022-07-07 DIAGNOSIS — I10 ESSENTIAL HYPERTENSION: ICD-10-CM

## 2022-07-07 DIAGNOSIS — E03.9 ACQUIRED HYPOTHYROIDISM: Primary | ICD-10-CM

## 2022-07-07 DIAGNOSIS — M85.80 OSTEOPENIA AFTER MENOPAUSE: ICD-10-CM

## 2022-07-07 DIAGNOSIS — M25.512 LEFT SHOULDER PAIN, UNSPECIFIED CHRONICITY: ICD-10-CM

## 2022-07-07 DIAGNOSIS — D70.9 NEUTROPENIA, UNSPECIFIED TYPE (HCC): ICD-10-CM

## 2022-07-07 DIAGNOSIS — I71.40 AAA (ABDOMINAL AORTIC ANEURYSM) WITHOUT RUPTURE: ICD-10-CM

## 2022-07-07 PROCEDURE — 99214 OFFICE O/P EST MOD 30 MIN: CPT | Performed by: FAMILY MEDICINE

## 2022-07-07 RX ORDER — LIDOCAINE 40 MG/G
CREAM TOPICAL 2 TIMES DAILY
Qty: 30 G | Refills: 5 | Status: SHIPPED | OUTPATIENT
Start: 2022-07-07

## 2022-07-07 NOTE — PATIENT INSTRUCTIONS
-Follow up in 1 month  -Have blood work drawn at least one week prior to next visit  -Okay to take morning tylenol with levothyroxine as long as both taken on empty stomach with water- wait at least 30 minutes before eating or other meds  -Physical therapy and occupational therapy referrals for left shoulder pain   -Ok to cancel DXA scan

## 2022-07-07 NOTE — PROGRESS NOTES
[address] [facility]    NAME: Caroline Scott  AGE: 80 y o  SEX: female    DATE OF ENCOUNTER: 7/7/22    Assessment and Plan     Acquired hypothyroidism  Continue levothyroxine  Most recent TSH WNL with low free T4  Repeat TSH and free T4 ordered    Essential hypertension  Goal <140/90; ideally 130/80 in setting of AAA  Avoid hypotension given age  Continue amlodipine, benazepril  BMP ordered- at risk for hyperkalemia on KCl supplementation and ACE-i    AAA (abdominal aortic aneurysm) without rupture (HCC)  Reviewed recent abdominal aortic ultrasound; distal aortic aneurysm 3 6cm, well below 5 5cm cut off; this is chronic and stable overall for patient; she would not desire surgical repair; further follow up imaging declined in this setting  BP management as outlined    Left shoulder pain  Acute on chronic; suspect underlying OA with rotator cuff pathology  Continue scheduled acetaminophen 975mg TID  Start topical aspercreme 4% lidocaine twice daily  PT and OT referrals  If fails conservative management, will refer to orthopedics     Osteopenia after menopause  Continue calcium and Vitamin D supplementation  Patient would not want to initiate antiresorptive therapy if DXA shows osteoporosis/ high fracture risk; risks vs benefits reviewed; patient would like study cancelled in this setting- d/c DXA scan      Orders Placed This Encounter   Procedures    TSH, 3rd generation    T4, free    Basic metabolic panel    CBC and differential    Ambulatory Referral to Physical Therapy    Ambulatory Referral to Occupational Therapy       Chief Complaint     Chief Complaint   Patient presents with    Follow-up     3 months       History of Present Illness     80year old female evaluated for routine follow up of chronic medical conditions  Notes worsening of chronic pain of her left shoulder; no fall or injury; patient is right handed   Has been taking acetaminophen three times daily with some relief; associated decreased ROM  Uses topical aspercreme on her hip which helps; has been going to physical therapy for chronic RLE weakness  Reviewed recent abdominal aortic ultrasound; distal aortic aneurysm 3 6cm, well below 5 5cm cut off; this is chronic and stable overall for patient; she would not desire surgical repair; further follow up imaging declined in this setting  Continues on amlodipine and benazepril for hypertension, /74 in office today  Patient with baseline osteopenia; on calcium and Vitamin D supplementation; she questions necessity of DXA scan  Patient would not want to initiate antiresorptive therapy if DXA shows osteoporosis; risks vs benefits reviewed; patient would like study cancelled in this setting  Continues on levothyroxine for hypothyroidism; compliant in taking medication first thing in the morning  Most recent free T4 low with normal TSH; due for thyroid studies  The following portions of the patient's history were reviewed and updated as appropriate: allergies, current medications, past family history, past medical history, past social history, past surgical history and problem list     Review of Systems     Review of Systems   Constitutional: Negative for chills, fever and unexpected weight change  Respiratory: Negative for cough and shortness of breath  Musculoskeletal: Positive for arthralgias  Negative for neck pain and neck stiffness  Neurological: Positive for weakness (chronic right leg)  All other systems reviewed and are negative        Active Problem List     Patient Active Problem List   Diagnosis    Stress incontinence of urine    Essential hypertension    AAA (abdominal aortic aneurysm) without rupture ([facility] [address] )    Depression    Other hyperlipidemia    Right hip pain    Ambulatory dysfunction    Overactive bladder    Acquired hypothyroidism    Osteopenia after menopause    Primary osteoarthritis of one hip, right    Greater trochanteric bursitis of right hip    Primary osteoarthritis of right knee    Skin lesion    Edema of left foot    Cyst of soft tissue    Left shoulder pain    Weakness of left lower extremity       Objective     /74 (BP Location: Left arm, Patient Position: Sitting, Cuff Size: Standard)   Pulse 64   Temp 97 8 °F (36 6 °C) (Tympanic)   Resp 16   Ht 5' 3" (1 6 m) Comment: as per last vital  Wt 64 6 kg (142 lb 6 4 oz) Comment: w shoes  SpO2 96%   BMI 25 23 kg/m²     Physical Exam  Vitals and nursing note reviewed  Constitutional:       General: She is awake  She is not in acute distress  Appearance: She is well-developed and well-groomed  She is not ill-appearing, toxic-appearing or diaphoretic  HENT:      Head: Normocephalic and atraumatic  Right Ear: External ear normal       Left Ear: External ear normal       Nose: No rhinorrhea  Mouth/Throat:      Mouth: Mucous membranes are moist    Eyes:      General: No scleral icterus  Right eye: No discharge  Left eye: No discharge  Conjunctiva/sclera: Conjunctivae normal    Pulmonary:      Effort: Pulmonary effort is normal  No respiratory distress  Abdominal:      General: There is no distension  Musculoskeletal:      Right shoulder: No swelling, tenderness or bony tenderness  Left shoulder: Swelling present  No tenderness, bony tenderness or crepitus  Decreased range of motion  Decreased strength  Left elbow: No effusion  No tenderness  Cervical back: No rigidity or tenderness  Left lower le+ Edema present  Comments: Left shoulder extension limited to  degrees due to pain  Internal and external left shoulder rotation severely reduced with limitation due to pain   Skin:     General: Skin is warm and dry  Coloration: Skin is not jaundiced or pale  Neurological:      Mental Status: She is alert  Cranial Nerves: No dysarthria or facial asymmetry     Psychiatric: Attention and Perception: Attention and perception normal          Mood and Affect: Mood and affect normal          Speech: Speech normal          Behavior: Behavior normal  Behavior is cooperative  Thought Content:  Thought content normal          Cognition and Memory: Cognition and memory normal          Judgment: Judgment normal          Pertinent Laboratory/Diagnostic Studies:  Ultrasound abdominal aorta (4/4/22)- 3 6cm dilation of distal aorta  CMP, Free T4, Lipid panel, TSH, CBC (4/15/22)    Current Medications     Current Outpatient Medications:     acetaminophen (TYLENOL) 500 mg tablet, Take 2 tablets (1,000 mg total) by mouth every 12 (twelve) hours, Disp: 540 tablet, Rfl: 1    amLODIPine (NORVASC) 5 mg tablet, Take 1 tablet (5 mg total) by mouth in the morning , Disp: 90 tablet, Rfl: 0    aspirin 81 mg chewable tablet, Chew 81 mg daily, Disp: , Rfl:     atorvastatin (LIPITOR) 40 mg tablet, TAKE 1 TABLET DAILY, Disp: 90 tablet, Rfl: 3    benazepril (LOTENSIN) 20 mg tablet, TAKE 1 TABLET DAILY, Disp: 90 tablet, Rfl: 3    Calcium Carbonate-Vit D-Min (CALCIUM 1200 PO), Take by mouth Take 2 tablets daily, Disp: , Rfl:     cholecalciferol (VITAMIN D3) 1,000 units tablet, Take 1,000 Units by mouth daily, Disp: , Rfl:     levothyroxine 25 mcg tablet, TAKE ONE AND ONE-HALF TABLETS DAILY, Disp: 135 tablet, Rfl: 3    lidocaine (LMX) 4 % cream, Apply topically 2 (two) times a day, Disp: 30 g, Rfl: 5    Mirabegron ER (Myrbetriq) 50 MG TB24, Take 1 tablet (50 mg total) by mouth daily, Disp: 90 tablet, Rfl: 1    potassium chloride (K-DUR,KLOR-CON) 20 mEq tablet, Take 1 tablet (20 mEq total) by mouth daily, Disp: 30 tablet, Rfl: 0    sertraline (ZOLOFT) 100 mg tablet, TAKE ONE AND ONE-HALF TABLETS DAILY, Disp: 135 tablet, Rfl: 3    Health Maintenance     Health Maintenance   Topic Date Due    Medicare Annual Wellness Visit (AWV)  Never done    BMI: Followup Plan  Never done    Fall Risk  Never done    Pneumococcal Vaccine: 65+ Years (1 - PCV) Never done    COVID-19 Vaccine (4 - Booster for Moderna series) 03/11/2022    Influenza Vaccine (1) 09/01/2022    BMI: Adult  07/07/2023    DTaP,Tdap,and Td Vaccines (2 - Td or Tdap) 04/14/2026    HIB Vaccine  Aged Out    Hepatitis B Vaccine  Aged Out    IPV Vaccine  Aged Out    Hepatitis A Vaccine  Aged Out    Meningococcal ACWY Vaccine  Aged Out    HPV Vaccine  Aged Out     Immunization History   Administered Date(s) Administered    COVID-19 MODERNA VACC 0 5 ML IM 02/09/2021, 03/09/2021, 11/11/2021    Influenza Quadrivalent, 6-35 Months IM 10/14/2015    Influenza, high dose seasonal 0 7 mL 10/29/2019, 09/29/2020    Tdap 04/14/2016    Zoster 12/03/2013     Jaki Davila, DO  7/7/22

## 2022-07-08 NOTE — ASSESSMENT & PLAN NOTE
Goal <140/90; ideally 130/80 in setting of AAA  Avoid hypotension given age  Continue amlodipine, benazepril  BMP ordered- at risk for hyperkalemia on KCl supplementation and ACE-i

## 2022-07-08 NOTE — ASSESSMENT & PLAN NOTE
Acute on chronic; suspect underlying OA with rotator cuff pathology  Continue scheduled acetaminophen 975mg TID  Start topical aspercreme 4% lidocaine twice daily  PT and OT referrals  If fails conservative management, will refer to orthopedics

## 2022-07-08 NOTE — ASSESSMENT & PLAN NOTE
Continue calcium and Vitamin D supplementation  Patient would not want to initiate antiresorptive therapy if DXA shows osteoporosis/ high fracture risk; risks vs benefits reviewed; patient would like study cancelled in this setting- d/c DXA scan

## 2022-07-08 NOTE — ASSESSMENT & PLAN NOTE
Reviewed recent abdominal aortic ultrasound; distal aortic aneurysm 3 6cm, well below 5 5cm cut off; this is chronic and stable overall for patient; she would not desire surgical repair; further follow up imaging declined in this setting  BP management as outlined

## 2022-08-09 ENCOUNTER — OFFICE VISIT (OUTPATIENT)
Dept: GERIATRICS | Facility: OTHER | Age: 87
End: 2022-08-09
Payer: MEDICARE

## 2022-08-09 VITALS
RESPIRATION RATE: 16 BRPM | SYSTOLIC BLOOD PRESSURE: 130 MMHG | HEART RATE: 63 BPM | DIASTOLIC BLOOD PRESSURE: 70 MMHG | BODY MASS INDEX: 24.98 KG/M2 | WEIGHT: 141 LBS | TEMPERATURE: 97.6 F | OXYGEN SATURATION: 95 % | HEIGHT: 63 IN

## 2022-08-09 DIAGNOSIS — G89.29 CHRONIC LEFT SHOULDER PAIN: ICD-10-CM

## 2022-08-09 DIAGNOSIS — M19.012 OSTEOARTHRITIS OF LEFT SHOULDER, UNSPECIFIED OSTEOARTHRITIS TYPE: Primary | ICD-10-CM

## 2022-08-09 DIAGNOSIS — M16.11 PRIMARY OSTEOARTHRITIS OF ONE HIP, RIGHT: ICD-10-CM

## 2022-08-09 DIAGNOSIS — R26.2 AMBULATORY DYSFUNCTION: ICD-10-CM

## 2022-08-09 DIAGNOSIS — M17.11 PRIMARY OSTEOARTHRITIS OF RIGHT KNEE: ICD-10-CM

## 2022-08-09 DIAGNOSIS — E03.9 ACQUIRED HYPOTHYROIDISM: ICD-10-CM

## 2022-08-09 DIAGNOSIS — M25.512 CHRONIC LEFT SHOULDER PAIN: ICD-10-CM

## 2022-08-09 DIAGNOSIS — I10 ESSENTIAL HYPERTENSION: ICD-10-CM

## 2022-08-09 DIAGNOSIS — M70.61 GREATER TROCHANTERIC BURSITIS OF RIGHT HIP: ICD-10-CM

## 2022-08-09 PROCEDURE — 99214 OFFICE O/P EST MOD 30 MIN: CPT | Performed by: FAMILY MEDICINE

## 2022-08-09 NOTE — PATIENT INSTRUCTIONS
Continue PT/OT  Can use Voltaren gel twice daily  Referral to Sports Medicine for left shoulder x-ray was placed today  Will follow-up in the office in 6 weeks

## 2022-08-09 NOTE — PROGRESS NOTES
[facility]  [address], [address] [place] [address], [address] [place] [address]    [facility]      NAME: Arsh Gomes  AGE: 80 y o  SEX: female    DATE OF ENCOUNTER: 8/9/2022    Assessment and Plan     1  Acquired hypothyroidism  - TSH was WNL 2 1 (8/2/22)  - continue levothyroxine daily    2  Essential hypertension  - BP stable, at goal  - continue current regimen with amlodipine and benazepril  - GFR 83 (8/2/22)    3  Chronic left shoulder pain  - suspected OA  Unlikely infectious pathology at this point    - Ambulatory Referral to Sports Medicine for Xray shoulder and possible steroid injection  Order placed today  4  Primary osteoarthritis of one hip, right  - Currently in no pain    5  Greater trochanteric bursitis of right hip  - stable, no pain    6  Primary osteoarthritis of right knee  - wears brace when in pain  - may use voltaren gel     7  Ambulatory dysfunction  - OA in multiple sites  - requires wheelchair at baseline  - fall precautions    8  Osteoarthritis of left shoulder, unspecified osteoarthritis type  - Ambulatory Referral to Sports Medicine; Future    No orders of the defined types were placed in this encounter       - Counseling Documentation: patient was counseled regarding: instructions for management and patient and family education    Chief Complaint     Chief Complaint   Patient presents with    Follow-up     4 weeks       History of Present Illness     HPI  Patient presents to the office today for follow-up her acute and chronic conditions  She had her blood work done last week  Results discussed today  Her left shoulder pain has not improved with Tylenol and lidocaine cream  She is able to put on her clothes, but unable to raise her left arm  She has sleep disturbance due to this pain some nights  She also has left ankle edema for which she is using compression socks      The following portions of the patient's history were reviewed and updated as appropriate: allergies, current medications, past family history, past medical history, past social history, past surgical history and problem list     Review of Systems     Review of Systems   Constitutional: Negative for appetite change, fatigue and unexpected weight change  HENT: Negative for trouble swallowing  Respiratory: Negative for shortness of breath  Cardiovascular: Negative for chest pain  Gastrointestinal: Negative for abdominal pain and constipation  Genitourinary: Negative for dysuria  Musculoskeletal: Positive for arthralgias (left shoulder), gait problem and joint swelling (left shoulder and left ankle)  Negative for neck pain  Neurological: Negative for headaches  Psychiatric/Behavioral: Positive for sleep disturbance (due to shoulder pain)  Active Problem List     Patient Active Problem List   Diagnosis    Stress incontinence of urine    Essential hypertension    AAA [facility]abdominal aortic aneurysm) without rupture ([facility] [address] )    Depression    Other hyperlipidemia    Right hip pain    Ambulatory dysfunction    Overactive bladder    Acquired hypothyroidism    Osteopenia after menopause    Primary osteoarthritis of one hip, right    Greater trochanteric bursitis of right hip    Primary osteoarthritis of right knee    Skin lesion    Edema of left foot    Cyst of soft tissue    Left shoulder pain    Weakness of left lower extremity       Objective     /70 (BP Location: Left arm, Patient Position: Sitting, Cuff Size: Standard)   Pulse 63   Temp 97 6 °F (36 4 °C) (Tympanic)   Resp 16   Ht 5' 3" (1 6 m)   Wt 64 kg (141 lb) Comment: as per patient on 7/16/22  SpO2 95%   BMI 24 98 kg/m²     Physical Exam  Vitals and nursing note reviewed  Constitutional:       General: She is not in acute distress  Appearance: She is not toxic-appearing  HENT:      Head: Atraumatic        Nose: Nose normal       Mouth/Throat:      Mouth: Mucous membranes are moist    Eyes:      General:         Right eye: No discharge  Left eye: No discharge  Conjunctiva/sclera: Conjunctivae normal    Cardiovascular:      Rate and Rhythm: Normal rate and regular rhythm  Heart sounds: No murmur heard  Pulmonary:      Effort: Pulmonary effort is normal       Breath sounds: Normal breath sounds  No rales  Abdominal:      General: Bowel sounds are normal  There is no distension  Palpations: Abdomen is soft  Tenderness: There is no abdominal tenderness  Musculoskeletal:         General: No swelling  Left shoulder: Tenderness (deltoid and bicipital groove) present  No deformity, effusion, bony tenderness or crepitus  Decreased range of motion (limited flexion and abduction)  Arms:       Cervical back: Neck supple  Skin:     General: Skin is warm  Neurological:      Mental Status: She is alert and oriented to person, place, and time     Psychiatric:         Behavior: Behavior normal          Pertinent Laboratory/Diagnostic Studies:      Current Medications     Current Outpatient Medications:     acetaminophen (TYLENOL) 500 mg tablet, Take 2 tablets (1,000 mg total) by mouth every 12 (twelve) hours, Disp: 540 tablet, Rfl: 1    amLODIPine (NORVASC) 5 mg tablet, Take 1 tablet (5 mg total) by mouth in the morning , Disp: 90 tablet, Rfl: 0    aspirin 81 mg chewable tablet, Chew 81 mg daily, Disp: , Rfl:     atorvastatin (LIPITOR) 40 mg tablet, TAKE 1 TABLET DAILY, Disp: 90 tablet, Rfl: 3    benazepril (LOTENSIN) 20 mg tablet, TAKE 1 TABLET DAILY, Disp: 90 tablet, Rfl: 3    Calcium Carbonate-Vit D-Min (CALCIUM 1200 PO), Take by mouth Take 2 tablets daily, Disp: , Rfl:     cholecalciferol (VITAMIN D3) 1,000 units tablet, Take 1,000 Units by mouth daily, Disp: , Rfl:     levothyroxine 25 mcg tablet, TAKE ONE AND ONE-HALF TABLETS DAILY, Disp: 135 tablet, Rfl: 3    lidocaine (LMX) 4 % cream, Apply topically 2 (two) times a day, Disp: 30 g, Rfl: 5    Mirabegron ER (Myrbetriq) 50 MG TB24, Take 1 tablet (50 mg total) by mouth daily, Disp: 90 tablet, Rfl: 1    potassium chloride (K-DUR,KLOR-CON) 20 mEq tablet, Take 1 tablet (20 mEq total) by mouth daily, Disp: 30 tablet, Rfl: 0    sertraline (ZOLOFT) 100 mg tablet, TAKE ONE AND ONE-HALF TABLETS DAILY, Disp: 135 tablet, Rfl: 3    Health Maintenance     Health Maintenance   Topic Date Due    Medicare Annual Wellness Visit (AWV)  Never done    Fall Risk  Never done    Urinary Incontinence Screening  Never done    Pneumococcal Vaccine: 65+ Years (1 - PCV) Never done    COVID-19 Vaccine (4 - Booster for Moderna series) 03/11/2022    Influenza Vaccine (1) 09/01/2022    BMI: Adult  07/07/2023    HIB Vaccine  Aged Out    Hepatitis B Vaccine  Aged Out    IPV Vaccine  Aged Out    Hepatitis A Vaccine  Aged Out    Meningococcal ACWY Vaccine  Aged Out    HPV Vaccine  Aged Out     Immunization History   Administered Date(s) Administered    COVID-19 MODERNA VACC 0 5 ML IM 02/09/2021, 03/09/2021, 11/11/2021    Influenza Quadrivalent, 6-35 Months IM 10/14/2015    Influenza, high dose seasonal 0 7 mL 10/29/2019, 09/29/2020    Tdap 04/14/2016    Zoster 12/03/2013

## 2022-08-15 DIAGNOSIS — I10 PRIMARY HYPERTENSION: ICD-10-CM

## 2022-08-15 RX ORDER — AMLODIPINE BESYLATE 5 MG/1
TABLET ORAL
Qty: 90 TABLET | Refills: 3 | Status: SHIPPED | OUTPATIENT
Start: 2022-08-15

## 2022-09-08 DIAGNOSIS — N32.81 OVERACTIVE BLADDER: ICD-10-CM

## 2022-09-08 RX ORDER — MIRABEGRON 50 MG/1
TABLET, FILM COATED, EXTENDED RELEASE ORAL
Qty: 90 TABLET | Refills: 3 | Status: SHIPPED | OUTPATIENT
Start: 2022-09-08

## 2022-09-26 ENCOUNTER — APPOINTMENT (OUTPATIENT)
Dept: RADIOLOGY | Facility: AMBULARY SURGERY CENTER | Age: 87
End: 2022-09-26
Attending: ORTHOPAEDIC SURGERY
Payer: MEDICARE

## 2022-09-26 ENCOUNTER — OFFICE VISIT (OUTPATIENT)
Dept: OBGYN CLINIC | Facility: CLINIC | Age: 87
End: 2022-09-26
Payer: MEDICARE

## 2022-09-26 VITALS — HEIGHT: 63 IN | WEIGHT: 141 LBS | BODY MASS INDEX: 24.98 KG/M2

## 2022-09-26 DIAGNOSIS — M19.012 OSTEOARTHRITIS OF LEFT SHOULDER, UNSPECIFIED OSTEOARTHRITIS TYPE: ICD-10-CM

## 2022-09-26 DIAGNOSIS — M25.512 CHRONIC LEFT SHOULDER PAIN: ICD-10-CM

## 2022-09-26 DIAGNOSIS — M12.812 ROTATOR CUFF ARTHROPATHY OF LEFT SHOULDER: ICD-10-CM

## 2022-09-26 DIAGNOSIS — G89.29 CHRONIC LEFT SHOULDER PAIN: ICD-10-CM

## 2022-09-26 PROCEDURE — 73030 X-RAY EXAM OF SHOULDER: CPT

## 2022-09-26 PROCEDURE — 20610 DRAIN/INJ JOINT/BURSA W/O US: CPT | Performed by: ORTHOPAEDIC SURGERY

## 2022-09-26 PROCEDURE — 99213 OFFICE O/P EST LOW 20 MIN: CPT | Performed by: ORTHOPAEDIC SURGERY

## 2022-09-26 RX ORDER — BUPIVACAINE HYDROCHLORIDE 2.5 MG/ML
1 INJECTION, SOLUTION INFILTRATION; PERINEURAL
Status: COMPLETED | OUTPATIENT
Start: 2022-09-26 | End: 2022-09-26

## 2022-09-26 RX ORDER — BETAMETHASONE SODIUM PHOSPHATE AND BETAMETHASONE ACETATE 3; 3 MG/ML; MG/ML
6 INJECTION, SUSPENSION INTRA-ARTICULAR; INTRALESIONAL; INTRAMUSCULAR; SOFT TISSUE
Status: COMPLETED | OUTPATIENT
Start: 2022-09-26 | End: 2022-09-26

## 2022-09-26 RX ORDER — LIDOCAINE HYDROCHLORIDE 10 MG/ML
0.5 INJECTION, SOLUTION INFILTRATION; PERINEURAL
Status: COMPLETED | OUTPATIENT
Start: 2022-09-26 | End: 2022-09-26

## 2022-09-26 RX ADMIN — LIDOCAINE HYDROCHLORIDE 0.5 ML: 10 INJECTION, SOLUTION INFILTRATION; PERINEURAL at 13:06

## 2022-09-26 RX ADMIN — BUPIVACAINE HYDROCHLORIDE 1 ML: 2.5 INJECTION, SOLUTION INFILTRATION; PERINEURAL at 13:06

## 2022-09-26 RX ADMIN — BETAMETHASONE SODIUM PHOSPHATE AND BETAMETHASONE ACETATE 6 MG: 3; 3 INJECTION, SUSPENSION INTRA-ARTICULAR; INTRALESIONAL; INTRAMUSCULAR; SOFT TISSUE at 13:06

## 2022-09-26 NOTE — PROGRESS NOTES
Assessment:  1  Rotator cuff arthropathy of left shoulder  Ambulatory Referral to Sports Medicine    XR shoulder 2+ vw left   2  Osteoarthritis of left shoulder, unspecified osteoarthritis type  Ambulatory Referral to Sports Medicine    XR shoulder 2+ vw left     Patient Active Problem List   Diagnosis    Stress incontinence of urine    Essential hypertension    AAA (abdominal aortic aneurysm) without rupture ([address] )    Depression    Other hyperlipidemia    Right hip pain    Ambulatory dysfunction    Overactive bladder    Acquired hypothyroidism    Osteopenia after menopause    Primary osteoarthritis of one hip, right    Greater trochanteric bursitis of right hip    Primary osteoarthritis of right knee    Skin lesion    Edema of left foot    Cyst of soft tissue    Left shoulder pain    Weakness of left lower extremity           Plan      Upon review of the X-rays, a thorough history and my examination, Ehsan Vargas is presenting with signs and symptoms consistent with osteoarthritis of the left shoulder and rotator cuff arthropathy  Diagnosis and treatment options were discussed with the patient and her nurse  She was given the opportunity to ask questions  A corticosteroid injection was offered and accepted  Procedure tolerated well  Post injection protocol advised  She may follow up in 3-4 months for repeat injections  Subjective:     Patient ID:    Chief Complaint:Jolene OAKLEY [place] Yogesh 80 y o  female      HPI    Patient comes in today with regards to initial evaluation of left shoulder pain  Patient states her pain started a few months ago, she denies any injury or trauma  She describes her pain as a constant ache that is worsened with movement  Abduction and flexion aggravate while rest alleviates  She has taken tylenol for her pain but states it does not help  She indicates both the anterior and posterior shoulder as the location of her pain        The following portions of the patient's history were reviewed and updated as appropriate: allergies, current medications, past family history, past social history, past surgical history and problem list         Social History     Socioeconomic History    Marital status: Single     Spouse name: Not on file    Number of children: Not on file    Years of education: Not on file    Highest education level: Not on file   Occupational History    Not on file   Tobacco Use    Smoking status: Former Smoker    Smokeless tobacco: Never Used   Substance and Sexual Activity    Alcohol use: Yes     Alcohol/week: 7 0 standard drinks     Types: 7 Glasses of wine per week    Drug use: No    Sexual activity: Not on file   Other Topics Concern    Not on file   Social History Narrative    Not on file     Social Determinants of Health     Financial Resource Strain: Not on file   Food Insecurity: Not on file   Transportation Needs: Not on file   Physical Activity: Not on file   Stress: Not on file   Social Connections: Not on file   Intimate Partner Violence: Not on file   Housing Stability: Not on file     Past Medical History:   Diagnosis Date    Cyst of soft tissue 12/10/2021    Disorder of bone     Edema     Essential hypertension     Hyperlipemia     Hyperlipidemia     Hypertension     Left shoulder pain 12/10/2021     Past Surgical History:   Procedure Laterality Date    FL INJECTION RIGHT HIP (NON ARTHROGRAM)  6/2/2021     Allergies   Allergen Reactions    Cobalt Other (See Comments)     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        Current Outpatient Medications on File Prior to Visit   Medication Sig Dispense Refill    acetaminophen (TYLENOL) 500 mg tablet Take 2 tablets (1,000 mg total) by mouth every 12 (twelve) hours 540 tablet 1    amLODIPine (NORVASC) 5 mg tablet TAKE 1 TABLET IN THE MORNING 90 tablet 3    aspirin 81 mg chewable tablet Chew 81 mg daily      atorvastatin (LIPITOR) 40 mg tablet TAKE 1 TABLET DAILY 90 tablet 3    benazepril (LOTENSIN) 20 mg tablet TAKE 1 TABLET DAILY 90 tablet 3    Calcium Carbonate-Vit D-Min (CALCIUM 1200 PO) Take by mouth Take 2 tablets daily      cholecalciferol (VITAMIN D3) 1,000 units tablet Take 1,000 Units by mouth daily      levothyroxine 25 mcg tablet TAKE ONE AND ONE-HALF TABLETS DAILY 135 tablet 3    lidocaine (LMX) 4 % cream Apply topically 2 (two) times a day 30 g 5    Myrbetriq 50 MG TB24 TAKE 1 TABLET DAILY 90 tablet 3    potassium chloride (K-DUR,KLOR-CON) 20 mEq tablet Take 1 tablet (20 mEq total) by mouth daily 30 tablet 0    sertraline (ZOLOFT) 100 mg tablet TAKE ONE AND ONE-HALF TABLETS DAILY 135 tablet 3     No current facility-administered medications on file prior to visit  Objective:    Review of Systems   Constitutional: Negative for chills and fever  HENT: Negative for ear pain and sore throat  Eyes: Negative for pain and visual disturbance  Respiratory: Negative for cough and shortness of breath  Cardiovascular: Negative for chest pain and palpitations  Gastrointestinal: Negative for abdominal pain and vomiting  Genitourinary: Negative for dysuria and hematuria  Musculoskeletal: Negative for arthralgias and back pain  Skin: Negative for color change and rash  Neurological: Negative for seizures and syncope  All other systems reviewed and are negative  Left Shoulder Exam     Muscle Strength   Abduction: 3/5   Internal rotation: 4/5     Other   Erythema: absent  Sensation: normal     Comments:  Positive crepitus with range of motion     3/5 empty can position   3/5 flexion     Edema in joint               Physical Exam  HENT:      Head: Normocephalic  Eyes:      Pupils: Pupils are equal, round, and reactive to light  Cardiovascular:      Rate and Rhythm: Normal rate  Pulmonary:      Effort: Pulmonary effort is normal    Musculoskeletal:         General: Normal range of motion        Cervical back: Normal range of motion  Skin:     General: Skin is warm and dry  Neurological:      General: No focal deficit present  Mental Status: She is alert  Psychiatric:         Behavior: Behavior normal          Large joint arthrocentesis: L glenohumeral  Universal Protocol:  Consent: Verbal consent obtained  Risks and benefits: risks, benefits and alternatives were discussed  Consent given by: patient  Timeout called at: 9/26/2022 12:59 PM   Patient understanding: patient states understanding of the procedure being performed  Patient identity confirmed: verbally with patient    Supporting Documentation  Indications: pain and diagnostic evaluation   Procedure Details  Location: shoulder - L glenohumeral  Preparation: Patient was prepped and draped in the usual sterile fashion  Needle size: 22 G  Ultrasound guidance: no  Medications administered: 1 mL bupivacaine 0 25 %; 0 5 mL lidocaine 1 %; 6 mg betamethasone acetate-betamethasone sodium phosphate 6 (3-3) mg/mL               I have personally reviewed pertinent films in PACS and my interpretation is X-rays of the left shoulder show significant osteoarthritis and rotator cuff arthropathy  Scribe Attestation    I,:  Carina Wilson am acting as a scribe while in the presence of the attending physician :       I,:  Ludivina Suarez, DO personally performed the services described in this documentation    as scribed in my presence :               Portions of the record may have been created with voice recognition software   Occasional wrong word or "sound a like" substitutions may have occurred due to the inherent limitations of voice recognition software   Read the chart carefully and recognize, using context, where substitutions have occurred

## 2022-10-31 DIAGNOSIS — I10 ESSENTIAL HYPERTENSION: ICD-10-CM

## 2022-10-31 RX ORDER — POTASSIUM CHLORIDE 20 MEQ/1
40 TABLET, EXTENDED RELEASE ORAL DAILY
Qty: 180 TABLET | Refills: 0 | Status: SHIPPED | OUTPATIENT
Start: 2022-10-31

## 2022-11-11 DIAGNOSIS — M16.11 PRIMARY OSTEOARTHRITIS OF ONE HIP, RIGHT: ICD-10-CM

## 2022-11-11 DIAGNOSIS — M17.11 PRIMARY OSTEOARTHRITIS OF RIGHT KNEE: ICD-10-CM

## 2022-11-11 RX ORDER — PSEUDOEPHED/ACETAMINOPH/DIPHEN 30MG-500MG
TABLET ORAL
Qty: 540 TABLET | Refills: 1 | Status: SHIPPED | OUTPATIENT
Start: 2022-11-11

## 2022-11-18 ENCOUNTER — HOSPITAL ENCOUNTER (EMERGENCY)
Facility: HOSPITAL | Age: 87
Discharge: HOME | End: 2022-11-18
Attending: EMERGENCY MEDICINE

## 2022-11-18 ENCOUNTER — APPOINTMENT (EMERGENCY)
Dept: CT IMAGING | Facility: HOSPITAL | Age: 87
End: 2022-11-18

## 2022-11-18 ENCOUNTER — TELEPHONE (OUTPATIENT)
Dept: OTHER | Facility: OTHER | Age: 87
End: 2022-11-18

## 2022-11-18 VITALS
BODY MASS INDEX: 26.95 KG/M2 | OXYGEN SATURATION: 93 % | RESPIRATION RATE: 20 BRPM | TEMPERATURE: 97.9 F | WEIGHT: 152.12 LBS | SYSTOLIC BLOOD PRESSURE: 145 MMHG | DIASTOLIC BLOOD PRESSURE: 65 MMHG | HEART RATE: 75 BPM
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
## 2022-11-18 RX ORDER — ACETAMINOPHEN 325 MG/1
650 TABLET ORAL ONCE
Status: COMPLETED | OUTPATIENT
Start: 2022-11-18 | End: 2022-11-18

## 2022-11-18 RX ADMIN — ACETAMINOPHEN 650 MG: 325 TABLET, FILM COATED ORAL at 08:13

## 2022-11-18 NOTE — ED NOTES
CAMILO Hayes transport requested vis round trip     [facility]  11/18/22 8054

## 2022-11-18 NOTE — ED NOTES
Offered patient a snack and/or something to drink  Patient denies needing anything at this time  Updated patient on the time she will be transferred back home        Ximena Costa RN  11/18/22 0318

## 2022-11-18 NOTE — DISCHARGE INSTRUCTIONS
Diagnosis;  fall/ head injury     - activity as tolerated     - her ct scan of head was negative     - she tolerated ambulation  with walker at baseline prior to er d/c

## 2022-11-18 NOTE — TELEPHONE ENCOUNTER
VASILE/[phone]/Mabel from [facility] /Pt had an unwitnessed fall and unsure if she hit her head  Pt being sent out  No bruises or injuries but pt on aspirin      Dr Kari Strong was paged via [facility] [place]

## 2022-11-22 NOTE — ED PROVIDER NOTES
History  Chief Complaint   Patient presents with   • Fall     89 yr female from local long term care facility  - states fell trying to get up from toilet -- states hit head-- no loc- no medical symptoms caused fall- was not on ground for long-- has no other comp - pt remembers entire event       History provided by:  Patient   used: No    Fall      Prior to Admission Medications   Prescriptions Last Dose Informant Patient Reported? Taking?    Acetaminophen Extra Strength 500 MG tablet   No No   Sig: TAKE 2 TABLETS EVERY 12 HOURS   Calcium Carbonate-Vit D-Min (CALCIUM 1200 PO)   Yes No   Sig: Take by mouth Take 2 tablets daily   Myrbetriq 50 MG TB24   No No   Sig: TAKE 1 TABLET DAILY   amLODIPine (NORVASC) 5 mg tablet   No No   Sig: TAKE 1 TABLET IN THE MORNING   aspirin 81 mg chewable tablet   Yes No   Sig: Chew 81 mg daily   atorvastatin (LIPITOR) 40 mg tablet   No No   Sig: TAKE 1 TABLET DAILY   benazepril (LOTENSIN) 20 mg tablet   No No   Sig: TAKE 1 TABLET DAILY   cholecalciferol (VITAMIN D3) 1,000 units tablet   Yes No   Sig: Take 1,000 Units by mouth daily   levothyroxine 25 mcg tablet   No No   Sig: TAKE ONE AND ONE-HALF TABLETS DAILY   lidocaine (LMX) 4 % cream   No No   Sig: Apply topically 2 (two) times a day   potassium chloride (K-DUR,KLOR-CON) 20 mEq tablet   No No   Sig: Take 2 tablets (40 mEq total) by mouth daily   sertraline (ZOLOFT) 100 mg tablet   No No   Sig: TAKE ONE AND ONE-HALF TABLETS DAILY      Facility-Administered Medications: None       Past Medical History:   Diagnosis Date   • Cyst of soft tissue 12/10/2021   • Disorder of bone    • Edema    • Essential hypertension    • Hyperlipemia    • Hyperlipidemia    • Hypertension    • Left shoulder pain 12/10/2021       Past Surgical History:   Procedure Laterality Date   • FL INJECTION RIGHT HIP (NON ARTHROGRAM)  6/2/2021       Family History   Family history unknown: Yes     I have reviewed and agree with the history as documented  E-Cigarette/Vaping     E-Cigarette/Vaping Substances     Social History     Tobacco Use   • Smoking status: Former   • Smokeless tobacco: Never   Substance Use Topics   • Alcohol use: Yes     Alcohol/week: 7 0 standard drinks     Types: 7 Glasses of wine per week   • Drug use: No       Review of Systems   Constitutional: Negative  HENT: Negative  Eyes: Negative  Respiratory: Negative  Cardiovascular: Negative  Gastrointestinal: Negative  Endocrine: Negative  Genitourinary: Negative  Musculoskeletal: Negative  Skin: Negative  Allergic/Immunologic: Negative  Neurological: Negative  Head injury    Hematological: Negative  Psychiatric/Behavioral: Negative  Physical Exam  Physical Exam  Vitals and nursing note reviewed  Constitutional:       General: She is not in acute distress  Appearance: Normal appearance  She is not ill-appearing, toxic-appearing or diaphoretic  Comments: avss--htnsie- which decreased in the er -- pulse ox 94 % on ra- interpretation is normal- no intervention    HENT:      Head: Normocephalic and atraumatic  Comments: No scalp tenderness/contusion/ hematoma/lac     Right Ear: Tympanic membrane, ear canal and external ear normal  There is no impacted cerumen  Left Ear: Tympanic membrane, ear canal and external ear normal  There is no impacted cerumen  Nose: Nose normal       Mouth/Throat:      Mouth: Mucous membranes are moist    Eyes:      General: No scleral icterus  Right eye: No discharge  Left eye: No discharge  Extraocular Movements: Extraocular movements intact  Conjunctiva/sclera: Conjunctivae normal       Pupils: Pupils are equal, round, and reactive to light  Comments: Mm pink   Neck:      Vascular: No carotid bruit  Comments: No pmt c/t/l/s spine   Cardiovascular:      Rate and Rhythm: Normal rate and regular rhythm  Pulses: Normal pulses        Heart sounds: Murmur heard  No friction rub  No gallop  Pulmonary:      Effort: Pulmonary effort is normal  No respiratory distress  Breath sounds: Normal breath sounds  No stridor  No wheezing, rhonchi or rales  Chest:      Chest wall: No tenderness  Abdominal:      General: Bowel sounds are normal  There is no distension  Palpations: Abdomen is soft  There is no mass  Tenderness: There is no abdominal tenderness  There is no right CVA tenderness, left CVA tenderness, guarding or rebound  Hernia: No hernia is present  Comments: Soft nt/nd- no hsm- no cva tenderness- no ascites- no p eritoneal signs- no pulsatile abd mass/bruit/ tenderness   Musculoskeletal:         General: No swelling, tenderness, deformity or signs of injury  Normal range of motion  Cervical back: Normal range of motion and neck supple  No rigidity or tenderness  Right lower leg: No edema  Left lower leg: No edema  Comments: No signs of bue/ble injury- moving all bue/ble painfree    Lymphadenopathy:      Cervical: No cervical adenopathy  Skin:     General: Skin is warm  Capillary Refill: Capillary refill takes less than 2 seconds  Coloration: Skin is not jaundiced or pale  Findings: No bruising, erythema, lesion or rash  Neurological:      General: No focal deficit present  Mental Status: She is alert and oriented to person, place, and time  Mental status is at baseline  Cranial Nerves: No cranial nerve deficit  Sensory: No sensory deficit  Motor: No weakness        Coordination: Coordination normal       Gait: Gait normal       Comments: Normal non focal neuro exam   Psychiatric:         Mood and Affect: Mood normal          Behavior: Behavior normal          Vital Signs  ED Triage Vitals   Temperature Pulse Respirations Blood Pressure SpO2   11/18/22 0728 11/18/22 0728 11/18/22 0728 11/18/22 0729 11/18/22 0728   97 7 °F (36 5 °C) 71 16 (!) 173/72 94 %      Temp Source Heart Rate Source Patient Position - Orthostatic VS BP Location FiO2 (%)   11/18/22 0728 11/18/22 0728 11/18/22 1215 11/18/22 1215 --   Oral Monitor Lying Right arm       Pain Score       11/18/22 0734       5           Vitals:    11/18/22 0905 11/18/22 0910 11/18/22 0915 11/18/22 1215   BP:   143/65 145/65   Pulse: 70 71 71 75   Patient Position - Orthostatic VS:    Lying         Visual Acuity  Visual Acuity    Flowsheet Row Most Recent Value   L Pupil Size (mm) 2   R Pupil Size (mm) 2          ED Medications  Medications   acetaminophen (TYLENOL) tablet 650 mg (650 mg Oral Given 11/18/22 0813)       Diagnostic Studies  Results Reviewed     None                 CT head without contrast   Final Result by Kristopher Navarrete MD (11/18 1022)      No acute intracranial abnormality  Workstation performed: [id number]                    Procedures  Procedures         ED Course  ED Course as of 11/22/22 1015   Fri Nov 18, 2022   1034 - er md note- pt tolerated ambulation with walker at baseline- will d/c                                             MDM    Disposition  Final diagnoses:   Fall, initial encounter     Time reflects when diagnosis was documented in both MDM as applicable and the Disposition within this note     Time User Action Codes Description Comment    11/18/2022 10:34 AM Power Hendrickson Add [O28  Joan Celaya, initial encounter       ED Disposition     ED Disposition   Discharge    Condition   Stable    Date/Time   Fri Nov 18, 2022 10:34 AM    Comment   Ingrid Mathur discharge to home/self care                 Follow-up Information    None         Discharge Medication List as of 11/18/2022 10:55 AM      CONTINUE these medications which have NOT CHANGED    Details   Acetaminophen Extra Strength 500 MG tablet TAKE 2 TABLETS EVERY 12 HOURS, Normal      amLODIPine (NORVASC) 5 mg tablet TAKE 1 TABLET IN THE MORNING, Normal      aspirin 81 mg chewable tablet Chew 81 mg daily, Historical Med      atorvastatin (LIPITOR) 40 mg tablet TAKE 1 TABLET DAILY, Normal      benazepril (LOTENSIN) 20 mg tablet TAKE 1 TABLET DAILY, Normal      Calcium Carbonate-Vit D-Min (CALCIUM 1200 PO) Take by mouth Take 2 tablets daily, Historical Med      cholecalciferol (VITAMIN D3) 1,000 units tablet Take 1,000 Units by mouth daily, Historical Med      levothyroxine 25 mcg tablet TAKE ONE AND ONE-HALF TABLETS DAILY, Normal      lidocaine (LMX) 4 % cream Apply topically 2 (two) times a day, Starting Thu 7/7/2022, Normal      Myrbetriq 50 MG TB24 TAKE 1 TABLET DAILY, Normal      potassium chloride (K-DUR,KLOR-CON) 20 mEq tablet Take 2 tablets (40 mEq total) by mouth daily, Starting Mon 10/31/2022, Normal      sertraline (ZOLOFT) 100 mg tablet TAKE ONE AND ONE-HALF TABLETS DAILY, Normal             No discharge procedures on file      PDMP Review     None          ED Provider  Electronically Signed by           Maryam Mancini MD  11/22/22 8756

## 2022-11-28 DIAGNOSIS — E78.5 HYPERLIPIDEMIA, UNSPECIFIED HYPERLIPIDEMIA TYPE: ICD-10-CM

## 2022-11-28 RX ORDER — ATORVASTATIN CALCIUM 40 MG/1
TABLET, FILM COATED ORAL
Qty: 90 TABLET | Refills: 3 | Status: SHIPPED | OUTPATIENT
Start: 2022-11-28

## 2022-12-01 NOTE — PROGRESS NOTES
[facility] Progress Note    Emeterio Jones     : 1951    DATE OF VISIT:  2022    Subjective:     Emeterio Jones is a 70 y.o. female who has a pressure ulcer located on the coccyx. Significant symptoms or pertinent wound history since last visit: feeling ok overall, mild pain at times, offloading well, mild-mod drainage, no fever. Additional ulcer(s) noted? no.      Her current medication list consists of Acapella, DULoxetine, Denosumab, Etanercept, Insulin Syringe-Needle U-100, Menthol (Topical Analgesic), Roflumilast, albuterol sulfate HFA, atorvastatin, azithromycin, blood glucose test strips, calcium carbonate, cetirizine, clopidogrel, cyclobenzaprine, docusate sodium, fluticasone, gabapentin, insulin glargine, insulin lispro, ipratropium, latanoprost, metoprolol succinate, morphine, naloxone, omeprazole, ondansetron, oxyCODONE-acetaminophen, predniSONE, sacubitril-valsartan, spironolactone, torsemide, and vitamin D. Allergies: Atenolol    Objective:     Vitals:    22 1024   BP: (!) 111/53   Pulse: 63   Resp: 18   Temp: 98.2 °F (36.8 °C)   TempSrc: Oral   Weight: 172 lb 12.8 oz (78.4 kg)   Height: 5' 8\" (1.727 m)     AAOx3, fatigued, NAD   No cellulitis, angitis, fluctuance  No acute arthritis or bursitis  No contact dermatitis or cutaneous Candidiasis  Blanca-ulcer skin: indurated, pink, warm, still a bit of maceration and hyperkeratosis, which overhangs a couple of wound edges  Ulcer(s):  probably a little smaller than last time, minimal true depth, red, granular, fibrin, biofilm, maybe a bit more undermining to one side again, a small focus of moist epidermal necrosis and epibole. Photos also saved in electronic chart.     Today's wound measurements, per RN documentation:  Wound 20 #2, Sacrum, Pressure Injury, Stage 4, Onset 2020-Wound Length (cm): 0.3 cm    Wound 20 #2, Sacrum, Pressure Injury, Stage 4, Onset 2020-Wound Width (cm): 0.3 Lynda Antoine 4/9/1931 female MRN: [id number]    Geriatric Medicine Follow-up Visit    ASSESSMENT/PLAN  Stress incontinence of urine  · Patient previously on Myrbetriq 50 mg po dialy, states it was helping throughout the day but she is still very uncomfortable at night with frequent awakenings  · I have recommended to try taking Myrbetriq at 3pm every day  · Recommend to avoid taking fluids 90 min prior to her bedtime  · Discussed at length other options and possible available interventions  She is reluctant to seeing Uro-Gyn but agreed to a referral and will think about making an appointment  Overactive bladder  · myrbetriq to be taken at 3 pm every day  · Referred to Dr Coni Matute of Uro-Gyn  Will think about it  Ambulatory dysfunction  · Completed PT course with some improvement  · Encouraged to stay as active as possible  · Continue to ambulate with rollator  · Continue fall precautions  Right hip pain  · Seen by Dr Elyssa Lara, now s/p trochanteric steroid injection for bursitis  · Now very much improved  Other hyperlipidemia  · Currently on lipitor 40 mg po daily  · Most recent lipid panel done in May 2019 stable wit hChol 178, tgc 80, HDL 75, LDL 87  Depression  · Mood remains stable  · Continue Sertraline 50 mg po QD  Essential hypertension  · Stable BP  · Continue Benazepril 20 mg daily  · Will order BMP to be done prior to next visit to monitor renal function and electrolytes  Acquired hypothyroidism  · Stable  · Continue current dose of levothyroxine  · Last TSH done 5/28/19 stable at 2 5  Future Appointments   Date Time Provider Chidi Sandhu   2/11/2020  1:30 PM Garland Hutchinson MD [facility]          SUBJECTIVE  CC: Follow-up (Pt here for routine visit- HTN,OB,Hypothyroidism, depression)      HPI:  Lynda Antoine is a 80 y o  female who presents for follow up on chronic conditions      Patient has hx of HTN, chronic edema, HLD, HTN   She was seen last 4 months ago  At that time we had referred her for R hip evaluation per Dr Suzette Murdock, concerned for trochanteric bursitis  She received a steroid injection with moderate rlef but does admit to persistent pain on her lateral R hip  States the pain is "nothing like it was before and I can live with it"  Non radiating pain, hurts more in the morning and also with ambulation  Pain improves with heating pad and also with tylenol  She has been taking Tylenol 1000 mg po q morning and states that provides relief  She has remained active but states she was never a social person  She admits she is not walking as much as she should  She is walking about 10 minutes every day  She does not do much all day  Sits around a lot and uses her Ipad often  She admits to feeling sleepy often during the day, mostly in the afternoon  She goes to bed around 130 am and gets out of bed around 8 am every morning  She thinks she is sleeping ok although she does have frequent awakenings to go use the bathroom  She has used Detrol in the past without relief  More recently I had prescribed Myrgbetric, currently on 50 MG SR daily which she was taking in the morning  States she found it helped throughout the day but she still was experiencing urge and frequency overnight  She stopped taking Myrbetric about 1 months ago  Review of Systems   Constitutional: Negative for activity change, appetite change, fatigue, fever and unexpected weight change  HENT: Positive for hearing loss  Negative for congestion, dental problem and trouble swallowing  Eyes: Negative for visual disturbance  Respiratory: Negative for apnea, cough, choking, chest tightness and shortness of breath  Cardiovascular: Negative for chest pain, palpitations and leg swelling  Gastrointestinal: Negative for abdominal distention, abdominal pain, constipation, diarrhea, nausea and vomiting     Genitourinary: Positive for frequency and cm    Wound 12/18/20 #2, Sacrum, Pressure Injury, Stage 4, Onset 5/2020-Wound Depth (cm): 0.3 cm    Assessment:     Patient Active Problem List   Diagnosis Code    Rheumatoid arthritis ([facility] [address] M06.9    Psoriasis L40.9    GERD (gastroesophageal reflux disease) K21.9    Anemia D64.9    Cylindrical bronchiectasis (HCC) J47.9    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    Immunocompromised state ([facility] [address] D84.9    Coronary artery disease I25.10    Bilateral lower extremity edema R60.0    Essential hypertension I10    Lumbar spondylosis M47.816    Mitral valve insufficiency and aortic valve insufficiency I08.0    Mixed hyperlipidemia E78.2    Myopia of both eyes H52.13    Osteoporosis M81.0    Other chronic sinusitis J32.8    Primary open angle glaucoma (POAG) of both eyes, mild stage H40.1131    Primary osteoarthritis of right hip M16.11    Type 2 diabetes mellitus with unspecified diabetic retinopathy without macular edema ([facility]) E11.319    Type 2 diabetes mellitus with diabetic peripheral angiopathy without gangrene, with long-term current use of insulin ([facility]) E11.51, Z79.4    Pressure ulcer of coccygeal region, stage 4 ([facility]) L89.154    Mild malnutrition ([facility]) E44.1    Chronic diastolic congestive heart failure ([facility]) I50.32    Chronic obstructive pulmonary disease ([facility]) J44.9    Hypergranulation L92.9    Nonrheumatic mitral (valve) insufficiency I34.0    Chronic respiratory failure with hypoxia ([facility]) J96.11    Venous insufficiency of left lower extremity I87.2       Assessment of today's active condition(s): RA, decreased mobility, multiple hospitalizations the last couple of years, development of a stage 4 coccyx pressure ulcer, no osteo, generally good progress with healing when she's well and able to remain at home, but things ARE moving slowly; in other circumstances I'd consider excising the wound edges to better saucerize the ulcer, but she heals so slowly, and IS making a bit of progress, so we'll see if we can continue to support healing despite that one area of epibole and undermining. Factors contributing to occurrence and/or persistence of the chronic ulcer include diabetes, chronic pressure, decreased mobility, shear force, and immunosuppression. Medical necessity of today's visit is shown by the above documentation. Sharp debridement is indicated today, based upon the exam findings in the wound(s) above. Procedure note:     Consent obtained. Time out performed per [address] arzate. Anesthetic  Anesthetic: 4% Lidocaine Cream     Using a curette and forceps, I sharply debrided the coccyx ulcer(s) down through and including the removal of dermis. The type(s) of tissue debrided included fibrin, biofilm, and necrotic/eschar. Total Surface Area Debrided: 1 sq cm. The ulcers were then irrigated with normal saline solution. The procedure was completed with a small amount of bleeding, and hemostasis was with pressure. The patient tolerated the procedure well, with no significant complications. The patient's level of pain during and after the procedure was monitored. Post-debridement measurements, if different from pre-debridement, are in the flowsheet as well. Discharge plan:     Treatment in the wound care center today, per RN documentation: Wound 12/18/20 #2, Sacrum, Pressure Injury, Stage 4, Onset 5/2020-Dressing/Treatment:  (Zno2(maegan), multidex,collagen, mepilex border). Keep focused on offloading and protein intake in particular. Home treatment: good handwashing before and after any dressing changes. Cleanse wound with saline or soap & water before dressing change. May use Vaseline (petrolatum), Aquaphor, Aveeno, CeraVe, Cetaphil, Eucerin, Lubriderm, etc for dry skin. Dressing type for home:  periwound Betadine and ZnO, then Vashe, Multidex powder, collagen tucked into that undermining, cover dressing , three times weekly. Written discharge instructions given to patient. Follow up in 2-3 weeks.     Electronically signed urgency  Negative for difficulty urinating, dysuria and flank pain  Musculoskeletal: Negative for arthralgias and gait problem  Skin: Negative for rash and wound  Psychiatric/Behavioral: Positive for sleep disturbance  Negative for agitation, behavioral problems and confusion  All other systems reviewed and are negative  Historical Information   The patient history was reviewed as follows:    Past Medical History:   Diagnosis Date    Disorder of bone     Edema     Essential hypertension     Hyperlipemia     Hyperlipidemia     Hypertension      No past surgical history on file    Family History   Family history unknown: Yes      Social History   Social History     Substance and Sexual Activity   Alcohol Use Yes    Alcohol/week: 7 0 standard drinks    Types: 7 Glasses of wine per week     Social History     Substance and Sexual Activity   Drug Use No     Social History     Tobacco Use   Smoking Status Former Smoker   Smokeless Tobacco Never Used       Medications:     Current Outpatient Medications:     amLODIPine (NORVASC) 5 mg tablet, Take 1 tablet (5 mg total) by mouth daily, Disp: 90 tablet, Rfl: 0    aspirin 81 MG tablet, Take 81 mg by mouth, Disp: , Rfl:     atorvastatin (LIPITOR) 40 mg tablet, TAKE 1 TABLET DAILY, Disp: 90 tablet, Rfl: 4    benazepril (LOTENSIN) 20 mg tablet, TAKE 1 TABLET DAILY, Disp: 90 tablet, Rfl: 3    Calcium Carbonate-Vit D-Min (CALCIUM 1200 PO), Take by mouth Take 2 tablets daily, Disp: , Rfl:     cholecalciferol (VITAMIN D3) 1,000 units tablet, Take 1,000 Units by mouth daily, Disp: , Rfl:     levothyroxine (SYNTHROID) 25 mcg tablet, Take 1 5 tablets (37 5 mcg total) by mouth daily, Disp: 90 tablet, Rfl: 3    Mirabegron ER (MYRBETRIQ) 50 MG TB24, Take by mouth daily, Disp: , Rfl:     sertraline (ZOLOFT) 50 mg tablet, Take 1 1/2 tablets daily, Disp: 135 tablet, Rfl: 3  No Known Allergies    OBJECTIVE    Vitals:   Vitals:    10/10/19 1046   BP: 138/70   BP Location: Left arm   Patient Position: Sitting   Cuff Size: Standard   Pulse: 60   Resp: 16   SpO2: 98%   Weight: 72 5 kg (159 lb 12 8 oz)   Height: 5' 3" (1 6 m)           Physical Exam   Constitutional: She is oriented to person, place, and time  She appears well-developed and well-nourished  No distress  HENT:   Head: Normocephalic and atraumatic  Mouth/Throat: Oropharynx is clear and moist  No oropharyngeal exudate  Eyes: Pupils are equal, round, and reactive to light  Conjunctivae and EOM are normal  Right eye exhibits no discharge  Left eye exhibits no discharge  Neck: Neck supple  Cardiovascular: Normal rate, regular rhythm and normal heart sounds  No murmur heard  Pulmonary/Chest: Effort normal and breath sounds normal  No respiratory distress  Abdominal: Soft  Bowel sounds are normal  She exhibits no distension  There is no tenderness  Musculoskeletal: She exhibits no edema, tenderness or deformity  Neurological: She is alert and oriented to person, place, and time  No cranial nerve deficit  Skin: Skin is warm and dry  No rash noted  She is not diaphoretic  No erythema  No pallor  Psychiatric: She has a normal mood and affect  Her behavior is normal  Judgment and thought content normal    Vitals reviewed  by Scarlet Naik MD on 12/1/2022 at 9:47 AM.

## 2022-12-08 ENCOUNTER — OFFICE VISIT (OUTPATIENT)
Dept: GERIATRICS | Facility: OTHER | Age: 87
End: 2022-12-08

## 2022-12-08 VITALS
HEIGHT: 63 IN | DIASTOLIC BLOOD PRESSURE: 70 MMHG | TEMPERATURE: 97.5 F | RESPIRATION RATE: 20 BRPM | BODY MASS INDEX: 26.95 KG/M2 | SYSTOLIC BLOOD PRESSURE: 146 MMHG | OXYGEN SATURATION: 96 % | HEART RATE: 88 BPM

## 2022-12-08 DIAGNOSIS — R29.898 WEAKNESS OF BOTH LOWER EXTREMITIES: Primary | ICD-10-CM

## 2022-12-08 DIAGNOSIS — E03.9 ACQUIRED HYPOTHYROIDISM: ICD-10-CM

## 2022-12-08 DIAGNOSIS — R26.2 AMBULATORY DYSFUNCTION: ICD-10-CM

## 2022-12-08 DIAGNOSIS — M17.11 PRIMARY OSTEOARTHRITIS OF RIGHT KNEE: ICD-10-CM

## 2022-12-08 DIAGNOSIS — M15.9 PRIMARY OSTEOARTHRITIS INVOLVING MULTIPLE JOINTS: ICD-10-CM

## 2022-12-08 PROBLEM — F51.01 PRIMARY INSOMNIA: Status: ACTIVE | Noted: 2022-12-08

## 2022-12-08 RX ORDER — ACETAMINOPHEN 500 MG
1000 TABLET ORAL 3 TIMES DAILY
Qty: 540 TABLET | Refills: 1
Start: 2022-12-08

## 2022-12-08 NOTE — ASSESSMENT & PLAN NOTE
With history of falls recent on 11/18/2022 and was evaluated in the emergency room    CT of head revealed no acute intracranial abnormality  Patient uses wheelchair at baseline  Continue precautions  Continue physical therapy

## 2022-12-08 NOTE — ASSESSMENT & PLAN NOTE
S/p fall 11/18/2022 in her apartment and was evaluated in the emergency room  She noted that she fell while trying to get off the toilet and hit her head  C/o increased BLE weakness without increase in osteoarthritis pain   Patient does not ambulate much, only to the bathroom, and uses wheelchair at baseline  Continue PT for increase endurance and strengthening, fall prevention measures  Fall Precautions

## 2022-12-08 NOTE — PROGRESS NOTES
[address] [facility]  POS: [facility]     NAME: Nely   AGE: 80 y o  SEX: female    DATE OF ENCOUNTER: 12/8/2022    Assessment and Plan     80year old female with:    Weakness of both lower extremities  S/p fall 11/18/2022 in her apartment and was evaluated in the emergency room  She noted that she fell while trying to get off the toilet and hit her head  C/o increased BLE weakness without increase in osteoarthritis pain  Patient does not ambulate much, only to the bathroom, and uses wheelchair at baseline  Continue PT for increase endurance and strengthening, fall prevention measures  Fall Precautions    Primary osteoarthritis involving multiple joints  Increase tylenol frequency to TID  Check LFT with next labs  Add diclofenac gel to right knee and right hip 3 times daily  Follow-up with orthopedics  Spoke to patient regarding pain management referral if needed    Ambulatory dysfunction  With history of falls recent on 11/18/2022 and was evaluated in the emergency room  CT of head revealed no acute intracranial abnormality  Patient uses wheelchair at baseline  Continue precautions  Continue physical therapy    Acquired hypothyroidism  Most recent TSH 2 01  Continue levothyroxine 25 mcg daily     Primary osteoarthritis right knee   See under primary osteoarthritis involving multiple joints    Orders Placed This Encounter   Procedures   • Hepatic function panel     - Counseling Documentation: patient was counseled regarding: diagnostic results, instructions for management and importance of compliance with treatment    Chief Complaint     Weakness BLE    History of Present Illness     27-year-old female who presents in office today for evaluation of reported decline in mobility  She presents in wheelchair and provides her own medical history  Patient provides her own medical history    Patient states that she does have weakness to bilateral lower extremities and is currently in physical therapy  Patient states that she has chronic pain mostly in her right hip and right knee which increases with ambulation  She agreed to the increase of acetaminophen  Patient follows with orthopedics for left shoulder and rotator cuff arthropathy and received corticosteroid injection  to the left shoulder on 9/26/2022 and feels this has helped her  Patient has limited mobility  of the left upper extremity  The following portions of the patient's history were reviewed and updated as appropriate: allergies, current medications, past family history, past medical history, past social history, past surgical history and problem list     Review of Systems     Review of Systems   Constitutional: Negative for diaphoresis  HENT: Negative for hearing loss  Respiratory: Negative for cough and shortness of breath  Cardiovascular: Negative for chest pain  Endocrine: Negative  Genitourinary: Negative for dysuria and flank pain  Nocturnal incontinence  Musculoskeletal: Negative for gait problem  Neurological: Negative for dizziness, syncope, speech difficulty, light-headedness, numbness and headaches  Psychiatric/Behavioral: Positive for sleep disturbance (Due to having to urinate  )  Negative for behavioral problems, dysphoric mood and hallucinations  All other systems reviewed and are negative        Active Problem List     Patient Active Problem List   Diagnosis   • Stress incontinence of urine   • Essential hypertension   • AAA (abdominal aortic aneurysm) without rupture   • Depression   • Other hyperlipidemia   • Right hip pain   • Ambulatory dysfunction   • Overactive bladder   • Acquired hypothyroidism   • Osteopenia after menopause   • Primary osteoarthritis of one hip, right   • Greater trochanteric bursitis of right hip   • Primary osteoarthritis of right knee   • Skin lesion   • Edema of left foot   • Cyst of soft tissue   • Left shoulder pain   • Weakness of left lower extremity   • Weakness of both lower extremities   • Primary osteoarthritis involving multiple joints       Objective     /70 (BP Location: Left arm, Patient Position: Sitting, Cuff Size: Standard)   Pulse 88   Temp 97 5 °F (36 4 °C) (Temporal)   Resp 20   Ht 5' 3" (1 6 m) Comment: as per last vital  SpO2 96%   BMI 26 95 kg/m²     Physical Exam  Vitals and nursing note reviewed  Constitutional:       General: She is not in acute distress  Appearance: She is not toxic-appearing or diaphoretic  HENT:      Head: Normocephalic  Nose: No congestion or rhinorrhea  Mouth/Throat:      Mouth: Mucous membranes are moist       Pharynx: No oropharyngeal exudate  Eyes:      General: No scleral icterus  Right eye: No discharge  Left eye: No discharge  Extraocular Movements: Extraocular movements intact  Conjunctiva/sclera: Conjunctivae normal       Pupils: Pupils are equal, round, and reactive to light  Cardiovascular:      Rate and Rhythm: Normal rate and regular rhythm  Pulses: Normal pulses  Pulmonary:      Effort: Pulmonary effort is normal  No respiratory distress  Breath sounds: Normal breath sounds  No wheezing, rhonchi or rales  Abdominal:      General: Bowel sounds are normal  There is no distension  Palpations: Abdomen is soft  Tenderness: There is no abdominal tenderness  There is no guarding  Musculoskeletal:         General: Deformity (Left ankle) present  Cervical back: Neck supple  No rigidity  Right lower leg: No edema  Left lower leg: No edema  Comments: Moves all 4 extremities  Lymphadenopathy:      Cervical: No cervical adenopathy  Skin:     General: Skin is warm and dry  Capillary Refill: Capillary refill takes less than 2 seconds  Neurological:      Mental Status: She is alert  Mental status is at baseline     Psychiatric:         Mood and Affect: Mood normal  Behavior: Behavior normal          Thought Content:  Thought content normal        Pertinent Laboratory/Diagnostic Studies: Reviewed most recent labs from August 2022    Current Medications     Current Outpatient Medications:   •  acetaminophen (Acetaminophen Extra Strength) 500 mg tablet, Take 2 tablets (1,000 mg total) by mouth 3 (three) times a day, Disp: 540 tablet, Rfl: 1  •  Diclofenac Sodium (VOLTAREN) 1 %, Apply 2 g topically 4 (four) times a day Apply 4 gms to right knee and right hip TID, Disp: 60 g, Rfl: 3  •  amLODIPine (NORVASC) 5 mg tablet, TAKE 1 TABLET IN THE MORNING, Disp: 90 tablet, Rfl: 3  •  aspirin 81 mg chewable tablet, Chew 81 mg daily, Disp: , Rfl:   •  atorvastatin (LIPITOR) 40 mg tablet, TAKE 1 TABLET DAILY, Disp: 90 tablet, Rfl: 3  •  benazepril (LOTENSIN) 20 mg tablet, TAKE 1 TABLET DAILY, Disp: 90 tablet, Rfl: 3  •  Calcium Carbonate-Vit D-Min (CALCIUM 1200 PO), Take by mouth Take 2 tablets daily, Disp: , Rfl:   •  cholecalciferol (VITAMIN D3) 1,000 units tablet, Take 1,000 Units by mouth daily, Disp: , Rfl:   •  levothyroxine 25 mcg tablet, TAKE ONE AND ONE-HALF TABLETS DAILY, Disp: 135 tablet, Rfl: 3  •  Myrbetriq 50 MG TB24, TAKE 1 TABLET DAILY, Disp: 90 tablet, Rfl: 3  •  potassium chloride (K-DUR,KLOR-CON) 20 mEq tablet, Take 2 tablets (40 mEq total) by mouth daily, Disp: 180 tablet, Rfl: 0  •  sertraline (ZOLOFT) 100 mg tablet, TAKE ONE AND ONE-HALF TABLETS DAILY, Disp: 135 tablet, Rfl: 3    Health Maintenance     Health Maintenance   Topic Date Due   • Medicare Annual Wellness Visit (AWV)  Never done   • Hepatitis B Vaccine (1 of 3 - 3-dose series) Never done   • BMI: Followup Plan  Never done   • Fall Risk  Never done   • Urinary Incontinence Screening  Never done   • Pneumococcal Vaccine: 65+ Years (1 - PCV) Never done   • COVID-19 Vaccine (4 - Booster for Moderna series) 03/11/2022   • Influenza Vaccine (1) 09/01/2022   • BMI: Adult  11/18/2023   • HIB Vaccine  Aged Out   • IPV Vaccine  Aged Out   • Hepatitis A Vaccine  Aged Out   • Meningococcal ACWY Vaccine  Aged Out   • HPV Vaccine  Aged Out     Immunization History   Administered Date(s) Administered   • COVID-19 MODERNA VACC 0 5 ML IM 02/09/2021, 03/09/2021, 11/11/2021   • Influenza Quadrivalent, 6-35 Months IM 10/14/2015   • Influenza, high dose seasonal 0 7 mL 10/29/2019, 09/29/2020   • Tdap 04/14/2016   • Zoster 12/03/2013     This note was completed in part utilizing [employer] direct voice recognition software  Grammatical errors, random word insertion, spelling mistakes, and incomplete sentences may be an occasional consequence of the system secondary to software limitations, ambient noise and hardware issues  At the time of dictation, efforts were made to edit, clarify and/or correct errors  Please read the chart carefully and recognize, using context, where substitutions have occurred  If you have any questions or concerns about the context, text or information contained within the body of this dictation, please contact myself, the provider, for further clarification      Darlene Wood [address] [place] [address]  12/8/2022 3:03 PM

## 2022-12-08 NOTE — ASSESSMENT & PLAN NOTE
Increase tylenol frequency to TID   Check LFT with next labs  Add diclofenac gel to right knee and right hip 3 times daily  Follow-up with orthopedics  Spoke to patient regarding pain management referral if needed

## 2022-12-15 ENCOUNTER — TELEPHONE (OUTPATIENT)
Age: 87
End: 2022-12-15

## 2022-12-15 NOTE — TELEPHONE ENCOUNTER
Dodie Mitchell with [employer] ([phone]) called to get clarification on directions for use of diclofenac sodium 1%  Please use reference #[id number] when returning this call  [facility] must receive a return call within 24 hours  This is the pharmacy final attempt to get this prescription filled

## 2023-01-03 ENCOUNTER — TELEPHONE (OUTPATIENT)
Dept: OTHER | Facility: OTHER | Age: 88
End: 2023-01-03

## 2023-01-04 ENCOUNTER — OFFICE VISIT (OUTPATIENT)
Dept: OBGYN CLINIC | Facility: CLINIC | Age: 88
End: 2023-01-04

## 2023-01-04 VITALS
WEIGHT: 152 LBS | HEART RATE: 72 BPM | HEIGHT: 63 IN | SYSTOLIC BLOOD PRESSURE: 136 MMHG | BODY MASS INDEX: 26.93 KG/M2 | DIASTOLIC BLOOD PRESSURE: 74 MMHG

## 2023-01-04 DIAGNOSIS — M12.812 ROTATOR CUFF ARTHROPATHY OF LEFT SHOULDER: Primary | ICD-10-CM

## 2023-01-04 RX ORDER — BUPIVACAINE HYDROCHLORIDE 2.5 MG/ML
2 INJECTION, SOLUTION INFILTRATION; PERINEURAL
Status: COMPLETED | OUTPATIENT
Start: 2023-01-04 | End: 2023-01-04

## 2023-01-04 RX ORDER — LIDOCAINE HYDROCHLORIDE 10 MG/ML
1 INJECTION, SOLUTION INFILTRATION; PERINEURAL
Status: COMPLETED | OUTPATIENT
Start: 2023-01-04 | End: 2023-01-04

## 2023-01-04 RX ORDER — BETAMETHASONE SODIUM PHOSPHATE AND BETAMETHASONE ACETATE 3; 3 MG/ML; MG/ML
6 INJECTION, SUSPENSION INTRA-ARTICULAR; INTRALESIONAL; INTRAMUSCULAR; SOFT TISSUE
Status: COMPLETED | OUTPATIENT
Start: 2023-01-04 | End: 2023-01-04

## 2023-01-04 RX ADMIN — BUPIVACAINE HYDROCHLORIDE 2 ML: 2.5 INJECTION, SOLUTION INFILTRATION; PERINEURAL at 13:55

## 2023-01-04 RX ADMIN — LIDOCAINE HYDROCHLORIDE 1 ML: 10 INJECTION, SOLUTION INFILTRATION; PERINEURAL at 13:55

## 2023-01-04 RX ADMIN — BETAMETHASONE SODIUM PHOSPHATE AND BETAMETHASONE ACETATE 6 MG: 3; 3 INJECTION, SUSPENSION INTRA-ARTICULAR; INTRALESIONAL; INTRAMUSCULAR; SOFT TISSUE at 13:55

## 2023-01-04 NOTE — PROGRESS NOTES
Assessment:  1  Rotator cuff arthropathy of left shoulder          Patient Active Problem List   Diagnosis   • Stress incontinence of urine   • Essential hypertension   • AAA (abdominal aortic aneurysm) without rupture   • Depression   • Other hyperlipidemia   • Right hip pain   • Ambulatory dysfunction   • Overactive bladder   • Acquired hypothyroidism   • Osteopenia after menopause   • Primary osteoarthritis of one hip, right   • Greater trochanteric bursitis of right hip   • Primary osteoarthritis of right knee   • Skin lesion   • Edema of left foot   • Cyst of soft tissue   • Left shoulder pain   • Weakness of left lower extremity   • Weakness of both lower extremities   • Primary osteoarthritis involving multiple joints   • Rotator cuff arthropathy of left shoulder           Plan       cortisone injection left shoulder glenohumeral joint  Follow up on as-needed basis            Subjective:     Patient ID:    Chief Complaint:Jolene Lima 80 y o  female      HPI     patient comes in today with regards to her left shoulder she has rotator cuff arthropathy  Has arthritic changes in the glenohumeral joint  She was given a cortisone injection over 3 months ago into the glenohumeral joint tolerated very well and had improvements  The following portions of the patient's history were reviewed and updated as appropriate: allergies, current medications, past family history, past social history, past surgical history and problem list     All organ systems normal    Social History     Socioeconomic History   • Marital status: Single     Spouse name: Not on file   • Number of children: Not on file   • Years of education: Not on file   • Highest education level: Not on file   Occupational History   • Not on file   Tobacco Use   • Smoking status: Former   • Smokeless tobacco: Never   Substance and Sexual Activity   • Alcohol use:  Yes     Alcohol/week: 7 0 standard drinks     Types: 7 Glasses of wine per week   • Drug use: No   • Sexual activity: Not on file   Other Topics Concern   • Not on file   Social History Narrative   • Not on file     Social Determinants of Health     Financial Resource Strain: Not on file   Food Insecurity: Not on file   Transportation Needs: Not on file   Physical Activity: Not on file   Stress: Not on file   Social Connections: Not on file   Intimate Partner Violence: Not on file   Housing Stability: Not on file     Past Medical History:   Diagnosis Date   • Cyst of soft tissue 12/10/2021   • Disorder of bone    • Edema    • Essential hypertension    • Hyperlipemia    • Hyperlipidemia    • Hypertension    • Left shoulder pain 12/10/2021     Past Surgical History:   Procedure Laterality Date   • FL INJECTION RIGHT HIP (NON ARTHROGRAM)  6/2/2021     Allergies   Allergen Reactions   • Cobalt Other (See Comments)     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        Current Outpatient Medications on File Prior to Visit   Medication Sig Dispense Refill   • acetaminophen (Acetaminophen Extra Strength) 500 mg tablet Take 2 tablets (1,000 mg total) by mouth 3 (three) times a day 540 tablet 1   • amLODIPine (NORVASC) 5 mg tablet TAKE 1 TABLET IN THE MORNING 90 tablet 3   • aspirin 81 mg chewable tablet Chew 81 mg daily     • atorvastatin (LIPITOR) 40 mg tablet TAKE 1 TABLET DAILY 90 tablet 3   • benazepril (LOTENSIN) 20 mg tablet TAKE 1 TABLET DAILY 90 tablet 3   • Calcium Carbonate-Vit D-Min (CALCIUM 1200 PO) Take by mouth Take 2 tablets daily     • cholecalciferol (VITAMIN D3) 1,000 units tablet Take 1,000 Units by mouth daily     • Diclofenac Sodium (VOLTAREN) 1 % Apply 2 g topically 4 (four) times a day Apply 4 gms to right knee and right hip TID 60 g 3   • levothyroxine 25 mcg tablet TAKE ONE AND ONE-HALF TABLETS DAILY 135 tablet 3   • Myrbetriq 50 MG TB24 TAKE 1 TABLET DAILY 90 tablet 3   • potassium chloride (K-DUR,KLOR-CON) 20 mEq tablet Take 2 tablets (40 mEq total) by mouth daily 180 tablet 0   • sertraline (ZOLOFT) 100 mg tablet TAKE ONE AND ONE-HALF TABLETS DAILY 135 tablet 3     No current facility-administered medications on file prior to visit  Objective:        Ortho Exam      No change in exam limited range of motion and positive crepitus with range of motion  Large joint arthrocentesis: L glenohumeral  Universal Protocol:  Consent given by: patient    Supporting Documentation  Indications: pain   Procedure Details  Location: shoulder - L glenohumeral  Needle size: 22 G  Approach: posterior  Medications administered: 2 mL bupivacaine 0 25 %; 1 mL lidocaine 1 %; 6 mg betamethasone acetate-betamethasone sodium phosphate 6 (3-3) mg/mL    Patient tolerance: patient tolerated the procedure well with no immediate complications            Portions of the record may have been created with voice recognition software   Occasional wrong word or "sound a like" substitutions may have occurred due to the inherent limitations of voice recognition software   Read the chart carefully and recognize, using context, where substitutions have occurred

## 2023-01-04 NOTE — TELEPHONE ENCOUNTER
BFF/[phone]/[id number] from [address] calling to advise that pt Fall and hit her head and refuses to go to the ED   Pt signed an AMA    Dr Harish Tadeo was paged via [facility]

## 2023-01-11 DIAGNOSIS — I10 ESSENTIAL HYPERTENSION: ICD-10-CM

## 2023-01-11 RX ORDER — POTASSIUM CHLORIDE 1500 MG/1
TABLET, EXTENDED RELEASE ORAL
Qty: 180 TABLET | Refills: 3 | Status: SHIPPED | OUTPATIENT
Start: 2023-01-11

## 2023-02-24 DIAGNOSIS — E03.9 ACQUIRED HYPOTHYROIDISM: ICD-10-CM

## 2023-02-24 RX ORDER — LEVOTHYROXINE SODIUM 0.03 MG/1
TABLET ORAL
Qty: 135 TABLET | Refills: 3 | Status: SHIPPED | OUTPATIENT
Start: 2023-02-24

## 2023-05-03 DIAGNOSIS — I10 ESSENTIAL HYPERTENSION: Primary | ICD-10-CM

## 2023-05-03 DIAGNOSIS — M85.80 OSTEOPENIA AFTER MENOPAUSE: ICD-10-CM

## 2023-05-03 DIAGNOSIS — Z78.0 OSTEOPENIA AFTER MENOPAUSE: ICD-10-CM

## 2023-05-03 RX ORDER — MELATONIN
1000 DAILY
Qty: 90 TABLET | Refills: 0 | Status: SHIPPED | OUTPATIENT
Start: 2023-05-03

## 2023-05-03 RX ORDER — BENAZEPRIL HYDROCHLORIDE 20 MG/1
20 TABLET ORAL DAILY
Qty: 90 TABLET | Refills: 3 | Status: SHIPPED | OUTPATIENT
Start: 2023-05-03

## 2023-05-11 ENCOUNTER — OFFICE VISIT (OUTPATIENT)
Dept: OBGYN CLINIC | Facility: CLINIC | Age: 88
End: 2023-05-11

## 2023-05-11 ENCOUNTER — APPOINTMENT (OUTPATIENT)
Dept: RADIOLOGY | Facility: AMBULARY SURGERY CENTER | Age: 88
End: 2023-05-11
Attending: ORTHOPAEDIC SURGERY

## 2023-05-11 VITALS
WEIGHT: 152 LBS | HEART RATE: 65 BPM | HEIGHT: 63 IN | BODY MASS INDEX: 26.93 KG/M2 | SYSTOLIC BLOOD PRESSURE: 148 MMHG | DIASTOLIC BLOOD PRESSURE: 71 MMHG

## 2023-05-11 DIAGNOSIS — M17.11 PRIMARY OSTEOARTHRITIS OF RIGHT KNEE: ICD-10-CM

## 2023-05-11 DIAGNOSIS — M16.11 PRIMARY OSTEOARTHRITIS OF ONE HIP, RIGHT: ICD-10-CM

## 2023-05-11 DIAGNOSIS — M16.11 PRIMARY OSTEOARTHRITIS OF ONE HIP, RIGHT: Primary | ICD-10-CM

## 2023-05-11 NOTE — PROGRESS NOTES
Assessment:  1  Primary osteoarthritis of one hip, right        2  Primary osteoarthritis of right knee          Patient Active Problem List   Diagnosis   • Stress incontinence of urine   • Essential hypertension   • AAA (abdominal aortic aneurysm) without rupture (HCC)   • Depression   • Other hyperlipidemia   • Right hip pain   • Ambulatory dysfunction   • Overactive bladder   • Acquired hypothyroidism   • Osteopenia after menopause   • Primary osteoarthritis of one hip, right   • Greater trochanteric bursitis of right hip   • Primary osteoarthritis of right knee   • Skin lesion   • Edema of left foot   • Cyst of soft tissue   • Left shoulder pain   • Weakness of left lower extremity   • Weakness of both lower extremities   • Primary osteoarthritis involving multiple joints   • Rotator cuff arthropathy of left shoulder           Plan      Cortisone injection right knee  Fluoroscopic guided injection into the right hip  We talked about hip replacement surgery she does not want any surgery at this time  Due to time constraints we were not able to see her for her left shoulder  Subjective:     Patient ID:    Chief Complaint:Jolene Garza 80 y o  female      HPI  Patient reports that she has pain in her right hip and right knee  Her pain in her right knee is about a 2 or 3  Pain in the right hip it was a 6 or 7  There is no pain when she is sitting pain is most associated with getting up and when she is walking        The following portions of the patient's history were reviewed and updated as appropriate: allergies, current medications, past family history, past social history, past surgical history and problem list         Social History     Socioeconomic History   • Marital status: Single     Spouse name: Not on file   • Number of children: Not on file   • Years of education: Not on file   • Highest education level: Not on file   Occupational History   • Not on file   Tobacco Use   • Smoking status: Former   • Smokeless tobacco: Never   Substance and Sexual Activity   • Alcohol use: Yes     Alcohol/week: 7 0 standard drinks     Types: 7 Glasses of wine per week   • Drug use: No   • Sexual activity: Not on file   Other Topics Concern   • Not on file   Social History Narrative   • Not on file     Social Determinants of Health     Financial Resource Strain: Not on file   Food Insecurity: Not on file   Transportation Needs: Not on file   Physical Activity: Not on file   Stress: Not on file   Social Connections: Not on file   Intimate Partner Violence: Not on file   Housing Stability: Not on file     Past Medical History:   Diagnosis Date   • Cyst of soft tissue 12/10/2021   • Disorder of bone    • Edema    • Essential hypertension    • Hyperlipemia    • Hyperlipidemia    • Hypertension    • Left shoulder pain 12/10/2021     Past Surgical History:   Procedure Laterality Date   • FL INJECTION RIGHT HIP (NON ARTHROGRAM)  6/2/2021     Allergies   Allergen Reactions   • Cobalt Other (See Comments)     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        Current Outpatient Medications on File Prior to Visit   Medication Sig Dispense Refill   • acetaminophen (Acetaminophen Extra Strength) 500 mg tablet Take 2 tablets (1,000 mg total) by mouth 3 (three) times a day 540 tablet 1   • amLODIPine (NORVASC) 5 mg tablet TAKE 1 TABLET IN THE MORNING 90 tablet 3   • aspirin 81 mg chewable tablet Chew 81 mg daily     • Aspirin Low Dose 81 MG EC tablet TAKE 1 TABLET DAILY 90 tablet 3   • atorvastatin (LIPITOR) 40 mg tablet TAKE 1 TABLET DAILY 90 tablet 3   • benazepril (LOTENSIN) 20 mg tablet Take 1 tablet (20 mg total) by mouth daily 90 tablet 3   • Calcium Carbonate-Vit D-Min (CALCIUM 1200 PO) Take by mouth Take 2 tablets daily     • cholecalciferol (VITAMIN D3) 1,000 units tablet Take 1 tablet (1,000 Units total) by mouth daily 90 tablet 0   • Diclofenac Sodium (VOLTAREN) 1 % Apply 2 g topically 4 (four) times a day Apply 4 gms to right knee and right hip TID 60 g 3   • Klor-Con M20 20 MEQ tablet TAKE 2 TABLETS DAILY 180 tablet 3   • levothyroxine 25 mcg tablet TAKE ONE AND ONE-HALF TABLETS DAILY 135 tablet 3   • Myrbetriq 50 MG TB24 TAKE 1 TABLET DAILY 90 tablet 3   • sertraline (ZOLOFT) 100 mg tablet TAKE ONE AND ONE-HALF TABLETS DAILY 135 tablet 3     No current facility-administered medications on file prior to visit  Objective:    Review of Systems   Constitutional: Negative for chills and fever  HENT: Negative for ear pain and sore throat  Eyes: Negative for pain and visual disturbance  Respiratory: Negative for cough and shortness of breath  Cardiovascular: Negative for chest pain and palpitations  Gastrointestinal: Negative for abdominal pain and vomiting  Genitourinary: Negative for dysuria and hematuria  Musculoskeletal: Negative for arthralgias and back pain  Skin: Negative for color change and rash  Neurological: Negative for seizures and syncope  All other systems reviewed and are negative  Right Knee Exam     Muscle Strength   The patient has normal right knee strength  Comments:  Examination of the right knee shows limitation in extension by about 5 to 10 degrees  The left knee has some effusion and both knees show valgus angulation with the left hip externally rotated as well  There is some palpable crepitus over the lateral joint line of the right knee no erythema  Full flexion however  There is some valgus alignment with varus laxity but no instability  Left Knee Exam     Muscle Strength   The patient has normal left knee strength  Other   Swelling: mild      Right Hip Exam     Comments:  Pain with any range of motion to internal and external rotation of the hip  She only gets to -2 degrees of internal rotation and you can hear the audible crepitus in her hip with motion  Physical Exam  Vitals and nursing note reviewed  "  HENT:      Head: Normocephalic  Eyes:      Extraocular Movements: Extraocular movements intact  Cardiovascular:      Rate and Rhythm: Normal rate  Pulses: Normal pulses  Pulmonary:      Effort: Pulmonary effort is normal    Musculoskeletal:         General: Normal range of motion  Cervical back: Normal range of motion  Skin:     General: Skin is warm and dry  Neurological:      General: No focal deficit present  Mental Status: She is alert  Psychiatric:         Behavior: Behavior normal          Procedures       I have personally reviewed pertinent films in PACS  Portions of the record may have been created with voice recognition software   Occasional wrong word or \"sound a like\" substitutions may have occurred due to the inherent limitations of voice recognition software   Read the chart carefully and recognize, using context, where substitutions have occurred      "

## 2023-05-17 DIAGNOSIS — M17.11 PRIMARY OSTEOARTHRITIS OF RIGHT KNEE: ICD-10-CM

## 2023-05-17 RX ORDER — PSEUDOEPHED/ACETAMINOPH/DIPHEN 30MG-500MG
TABLET ORAL
Qty: 540 TABLET | Refills: 1 | Status: SHIPPED | OUTPATIENT
Start: 2023-05-17

## 2023-05-25 NOTE — NURSING NOTE
Sent ordering provider an epic in basket and tiger text connect message for clarification on upcoming appointment  See message below  Good Afternoon Dr Daja Muse,     We appreciate the referral that was made from your office to perform this procedure on your patient  with Eloina Setting  1931  She is on our schedule for her right hip steroid injection  on 23 at 3 pm at the [facility]  We will be happy to perform this procedure and appreciate your use of our [facility] radiology department  Reason for my message to is to clarify that you want only the right hip injected we have 2 orders from you one with the non arthrogram injection to the right hip and the other is a fluoro procedure that I assume is for the right hip also  In your notes you mention that the right knee is to get injected also but not sure if you wanted that procedure with us and if you do we will need an order Fl guided needle place bx/as/inj under comments please indicate right knee steroid injection  We appreciate your help with this clarification  Have a nice day and thank you for your time and attention to this matter  If any question feel free to call,   Lexy Moreira RN  [facility] Radiology RN  [address] [place] [address]  [phone] (Office)  [phone] (Fax)  [email or web address]  Verónica Wu MD response

## 2023-05-25 NOTE — NURSING NOTE
Md returned message clarifying order  [facility]  Right hip only  20 days left  Sent: 5/25/23 1:02PM  Alex Rollins [employer] [facility])  Thank you for the clarification  May you have good rest of your day    20 days left  Sent: 5/25/23 1:03PM

## 2023-05-30 ENCOUNTER — HOSPITAL ENCOUNTER (OUTPATIENT)
Dept: RADIOLOGY | Facility: HOSPITAL | Age: 88
Discharge: HOME | End: 2023-05-30
Attending: ORTHOPAEDIC SURGERY | Admitting: RADIOLOGY

## 2023-05-30 ENCOUNTER — HOSPITAL ENCOUNTER (OUTPATIENT)
Dept: RADIOLOGY | Facility: HOSPITAL | Age: 88
Discharge: HOME | End: 2023-05-30
Attending: ORTHOPAEDIC SURGERY

## 2023-05-30 DIAGNOSIS — M16.11 PRIMARY OSTEOARTHRITIS OF ONE HIP, RIGHT: ICD-10-CM

## 2023-05-30 DIAGNOSIS — M17.11 PRIMARY OSTEOARTHRITIS OF RIGHT KNEE: ICD-10-CM

## 2023-05-30 RX ORDER — BUPIVACAINE HYDROCHLORIDE 2.5 MG/ML
1.5 INJECTION, SOLUTION EPIDURAL; INFILTRATION; INTRACAUDAL
Status: DISCONTINUED | OUTPATIENT
Start: 2023-05-30 | End: 2023-05-31 | Stop reason: HOSPADM

## 2023-05-30 RX ORDER — LIDOCAINE HYDROCHLORIDE 10 MG/ML
1 INJECTION, SOLUTION EPIDURAL; INFILTRATION; INTRACAUDAL; PERINEURAL ONCE
Status: DISCONTINUED | OUTPATIENT
Start: 2023-05-30 | End: 2023-05-31 | Stop reason: HOSPADM

## 2023-07-11 ENCOUNTER — OFFICE VISIT (OUTPATIENT)
Dept: GERIATRICS | Facility: OTHER | Age: 88
End: 2023-07-11
Payer: MEDICARE

## 2023-07-11 VITALS
HEIGHT: 63 IN | OXYGEN SATURATION: 91 % | DIASTOLIC BLOOD PRESSURE: 64 MMHG | TEMPERATURE: 97.2 F | HEART RATE: 56 BPM | RESPIRATION RATE: 16 BRPM | BODY MASS INDEX: 24.13 KG/M2 | SYSTOLIC BLOOD PRESSURE: 132 MMHG | WEIGHT: 136.2 LBS

## 2023-07-11 DIAGNOSIS — E03.9 ACQUIRED HYPOTHYROIDISM: Primary | ICD-10-CM

## 2023-07-11 DIAGNOSIS — N32.81 OVERACTIVE BLADDER: ICD-10-CM

## 2023-07-11 DIAGNOSIS — R26.2 AMBULATORY DYSFUNCTION: ICD-10-CM

## 2023-07-11 DIAGNOSIS — Z71.89 GOALS OF CARE, COUNSELING/DISCUSSION: ICD-10-CM

## 2023-07-11 DIAGNOSIS — I10 ESSENTIAL HYPERTENSION: ICD-10-CM

## 2023-07-11 DIAGNOSIS — M17.11 PRIMARY OSTEOARTHRITIS OF RIGHT KNEE: ICD-10-CM

## 2023-07-11 DIAGNOSIS — M65.332 ACQUIRED TRIGGER FINGER OF BOTH MIDDLE FINGERS: ICD-10-CM

## 2023-07-11 DIAGNOSIS — M65.331 ACQUIRED TRIGGER FINGER OF BOTH MIDDLE FINGERS: ICD-10-CM

## 2023-07-11 DIAGNOSIS — R63.4 WEIGHT LOSS: ICD-10-CM

## 2023-07-11 DIAGNOSIS — F32.A DEPRESSION, UNSPECIFIED DEPRESSION TYPE: ICD-10-CM

## 2023-07-11 DIAGNOSIS — M16.11 PRIMARY OSTEOARTHRITIS OF ONE HIP, RIGHT: ICD-10-CM

## 2023-07-11 PROCEDURE — 99214 OFFICE O/P EST MOD 30 MIN: CPT | Performed by: FAMILY MEDICINE

## 2023-07-11 RX ORDER — LIDOCAINE HCL 4% 4 G/100G
CREAM TOPICAL 4 TIMES DAILY PRN
COMMUNITY

## 2023-07-11 RX ORDER — SERTRALINE HYDROCHLORIDE 100 MG/1
200 TABLET, FILM COATED ORAL DAILY
Qty: 180 TABLET | Refills: 3 | Status: SHIPPED | OUTPATIENT
Start: 2023-07-11 | End: 2023-07-14

## 2023-07-11 RX ORDER — SERTRALINE HYDROCHLORIDE 100 MG/1
200 TABLET, FILM COATED ORAL DAILY
Qty: 180 TABLET | Refills: 3 | Status: SHIPPED | OUTPATIENT
Start: 2023-07-11 | End: 2023-07-11 | Stop reason: SDUPTHER

## 2023-07-11 RX ORDER — SERTRALINE HYDROCHLORIDE 100 MG/1
200 TABLET, FILM COATED ORAL DAILY
Qty: 60 TABLET | Refills: 0 | Status: SHIPPED | OUTPATIENT
Start: 2023-07-11

## 2023-07-11 RX ORDER — POLYVINYL ALCOHOL, POVIDONE .5; .6 G/100ML; G/100ML
LIQUID OPHTHALMIC EVERY 4 HOURS PRN
COMMUNITY

## 2023-07-11 NOTE — PATIENT INSTRUCTIONS
-Follow up in 3 months  -Blood work 1-2 weeks prior to next appointment  -OT referral for trigger fingers and hand arthritis  -PT referral for right knee and hip arthritis

## 2023-07-11 NOTE — PROGRESS NOTES
[address] [place] [address]  [facility]    NAME: Melida Lopez  AGE: 80 y.o.  SEX: female    DATE OF ENCOUNTER: 7/11/23    Assessment and Plan     Depression  Psychosocial support provided  Symptoms well managed on sertraline 200mg daily- continue, prescriptions updated and 30 day refill sent to [facility] and 90 day supply sent to mail order pharmacy  Thyroid labs, CBC, BMP ordered    Overactive bladder  Continue myrbetriq; consider dose reduction at next visit to assess if helping with symptoms or not  Recommend scheduled toileting every 2-3 hours while awake    Acquired hypothyroidism  Continue levothyroxine  Thyroid labs ordered    Essential hypertension  Goal <140/90; ideally 130/80 in setting of AAA  Avoid hypotension; ROGER log reviewed and overall within acceptable range  Continue amlodipine, benazepril  CMP ordered    Primary osteoarthritis of one hip, right  Continue scheduled acetaminophen, topical aspercreme  Diclofenac discontinued as patient notes no benefit with it's use    Primary osteoarthritis of right knee  See above    Ambulatory dysfunction  In setting of osteoarthritis, deconditioning  PT referral for strength, balance and gait training    Weight loss  Intentional in setting of generalized OA  Well balanced diet recommended; ensure adequate protein intake to support muscle growth especially with participation in PT  Patient within acceptable weight range at present, further weight loss not recommended at this time    Goals of care, counseling/discussion  DNR status confirmed and is documented in PC orders  POLST form provided to patient for further review      Orders Placed This Encounter   Procedures   • CBC and differential   • Comprehensive metabolic panel   • TSH, 3rd generation with Free T4 reflex   • Ambulatory Referral to Physical Therapy   • Ambulatory Referral to Occupational Therapy       Chief Complaint     Chief Complaint   Patient presents with   • Follow-up     DME       History of Present Illness     80year old female evaluated for routine follow up of chronic medical conditions in addition to annual DME visit for continued personal care services- paperwork completed. Gait limited by discomfort due to arthritis, especially in right knee. Previously seen by orthopedics, no significant benefit reported from injections. See ROS and A&P for additional HPI. The following portions of the patient's history were reviewed and updated as appropriate: allergies, current medications, past family history, past medical history, past social history, past surgical history and problem list.    Review of Systems     Review of Systems   Constitutional: Negative for chills, fever and unexpected weight change. Respiratory: Negative for cough and shortness of breath. Cardiovascular: Negative for leg swelling. Genitourinary: Negative for decreased urine volume. Urinary incontinence/ nocturia- wakes 4x/night   Musculoskeletal: Positive for arthralgias and gait problem. Neurological: Negative for dizziness and light-headedness.        Active Problem List     Patient Active Problem List   Diagnosis   • Stress incontinence of urine   • Essential hypertension   • AAA (abdominal aortic aneurysm) without rupture (HCC)   • Depression   • Other hyperlipidemia   • Right hip pain   • Ambulatory dysfunction   • Overactive bladder   • Acquired hypothyroidism   • Osteopenia after menopause   • Primary osteoarthritis of one hip, right   • Greater trochanteric bursitis of right hip   • Primary osteoarthritis of right knee   • Skin lesion   • Edema of left foot   • Cyst of soft tissue   • Left shoulder pain   • Weakness of left lower extremity   • Weakness of both lower extremities   • Primary osteoarthritis involving multiple joints   • Rotator cuff arthropathy of left shoulder   • Weight loss   • Goals of care, counseling/discussion       Objective     /64 (BP Location: Left arm, Patient Position: Sitting, Cuff Size: Adult)   Pulse 56   Temp (!) 97.2 °F (36.2 °C) (Temporal)   Resp 16   Ht 5' 3" (1.6 m)   Wt 61.8 kg (136 lb 3.2 oz)   SpO2 91%   BMI 24.13 kg/m²     Physical Exam  Vitals and nursing note reviewed. Constitutional:       General: She is awake. She is not in acute distress. Appearance: She is well-developed and well-groomed. She is not ill-appearing, toxic-appearing or diaphoretic. HENT:      Head: Normocephalic and atraumatic. Right Ear: External ear normal.      Left Ear: External ear normal.      Nose: No mucosal edema, congestion or rhinorrhea. Right Turbinates: Not enlarged or swollen. Left Turbinates: Not enlarged or swollen. Mouth/Throat:      Mouth: Mucous membranes are moist.   Eyes:      General: No scleral icterus. Right eye: No discharge. Left eye: No discharge. Conjunctiva/sclera: Conjunctivae normal.   Cardiovascular:      Rate and Rhythm: Normal rate and regular rhythm. Heart sounds: S1 normal and S2 normal.   Pulmonary:      Effort: Pulmonary effort is normal. No respiratory distress. Breath sounds: No wheezing. Abdominal:      General: Bowel sounds are normal. There is no distension. Palpations: Abdomen is soft. Musculoskeletal:      Right hand: Deformity present. Left hand: Deformity present. Cervical back: No rigidity. Right lower leg: No edema. Left lower leg: No edema. Comments: Osteoarthritic changes b/l hands; right hand 3rd trigger finger; left hand trigger fingers x3   Skin:     Coloration: Skin is not jaundiced or pale. Neurological:      Mental Status: She is alert. Mental status is at baseline. Cranial Nerves: No dysarthria or facial asymmetry.    Psychiatric:         Attention and Perception: Attention and perception normal.         Mood and Affect: Mood and affect normal.         Speech: Speech normal.         Behavior: Behavior is cooperative. Thought Content:  Thought content normal.         Pertinent Laboratory/Diagnostic Studies:  BMP (2/10/23)  BMP, CBC, TSH, T4 (8/2/22)    Current Medications     Current Outpatient Medications:   •  Acetaminophen Extra Strength 500 MG tablet, TAKE 2 TABLETS EVERY 12 HOURS, Disp: 540 tablet, Rfl: 1  •  amLODIPine (NORVASC) 5 mg tablet, TAKE 1 TABLET IN THE MORNING, Disp: 90 tablet, Rfl: 3  •  Aspirin Low Dose 81 MG EC tablet, TAKE 1 TABLET DAILY, Disp: 90 tablet, Rfl: 3  •  atorvastatin (LIPITOR) 40 mg tablet, TAKE 1 TABLET DAILY, Disp: 90 tablet, Rfl: 3  •  benazepril (LOTENSIN) 20 mg tablet, Take 1 tablet (20 mg total) by mouth daily, Disp: 90 tablet, Rfl: 3  •  Calcium Carbonate-Vit D-Min (CALCIUM 1200 PO), Take by mouth Take 2 tablets daily, Disp: , Rfl:   •  cholecalciferol (VITAMIN D3) 1,000 units tablet, Take 1 tablet (1,000 Units total) by mouth daily, Disp: 90 tablet, Rfl: 0  •  levothyroxine 25 mcg tablet, TAKE ONE AND ONE-HALF TABLETS DAILY, Disp: 135 tablet, Rfl: 3  •  Lidocaine HCl (Aspercreme Lidocaine) 4 % CREA, Apply topically 4 (four) times a day as needed, Disp: , Rfl:   •  Myrbetriq 50 MG TB24, TAKE 1 TABLET DAILY, Disp: 90 tablet, Rfl: 3  •  Polyvinyl Alcohol-Povidone (Artificial Tears) 5-6 MG/ML SOLN, Apply to eye every 4 (four) hours as needed, Disp: , Rfl:   •  sertraline (ZOLOFT) 100 mg tablet, Take 2 tablets (200 mg total) by mouth daily, Disp: 60 tablet, Rfl: 0  •  sertraline (ZOLOFT) 100 mg tablet, Take 2 tablets (200 mg total) by mouth daily, Disp: 180 tablet, Rfl: 3  •  aspirin 81 mg chewable tablet, Chew 81 mg daily, Disp: , Rfl:     Jaki Davila,   7/11/23

## 2023-07-12 ENCOUNTER — APPOINTMENT (EMERGENCY)
Dept: CT IMAGING | Facility: HOSPITAL | Age: 88
DRG: 556 | End: 2023-07-12
Payer: MEDICARE

## 2023-07-12 ENCOUNTER — HOSPITAL ENCOUNTER (INPATIENT)
Facility: HOSPITAL | Age: 88
LOS: 1 days | Discharge: NURSING, REHAB OR LONG-TERM CARE | DRG: 556 | End: 2023-07-14
Attending: EMERGENCY MEDICINE | Admitting: INTERNAL MEDICINE
Payer: MEDICARE

## 2023-07-12 DIAGNOSIS — N94.89 ADNEXAL MASS: ICD-10-CM

## 2023-07-12 DIAGNOSIS — R91.8 LUNG MASS: ICD-10-CM

## 2023-07-12 DIAGNOSIS — R91.8 MASS OF LOWER LOBE OF LEFT LUNG: Primary | ICD-10-CM

## 2023-07-12 DIAGNOSIS — I71.40 AAA (ABDOMINAL AORTIC ANEURYSM) (HCC): ICD-10-CM
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
## 2023-07-12 PROCEDURE — G1004 CDSM NDSC: HCPCS

## 2023-07-12 PROCEDURE — 70450 CT HEAD/BRAIN W/O DYE: CPT

## 2023-07-12 PROCEDURE — 99285 EMERGENCY DEPT VISIT HI MDM: CPT | Performed by: EMERGENCY MEDICINE

## 2023-07-12 PROCEDURE — 99285 EMERGENCY DEPT VISIT HI MDM: CPT

## 2023-07-12 PROCEDURE — 71250 CT THORAX DX C-: CPT

## 2023-07-12 PROCEDURE — 74176 CT ABD & PELVIS W/O CONTRAST: CPT

## 2023-07-12 PROCEDURE — 96374 THER/PROPH/DIAG INJ IV PUSH: CPT

## 2023-07-12 PROCEDURE — 72125 CT NECK SPINE W/O DYE: CPT

## 2023-07-12 RX ORDER — HYDROMORPHONE HCL IN WATER/PF 6 MG/30 ML
0.2 PATIENT CONTROLLED ANALGESIA SYRINGE INTRAVENOUS ONCE
Status: COMPLETED | OUTPATIENT
Start: 2023-07-13 | End: 2023-07-12

## 2023-07-12 RX ADMIN — HYDROMORPHONE HYDROCHLORIDE 0.2 MG: 0.2 INJECTION, SOLUTION INTRAMUSCULAR; INTRAVENOUS; SUBCUTANEOUS at 23:57

## 2023-07-12 NOTE — ASSESSMENT & PLAN NOTE
Goal <140/90; ideally 130/80 in setting of AAA  Avoid hypotension; ROGER log reviewed and overall within acceptable range  Continue amlodipine, benazepril  CMP ordered

## 2023-07-12 NOTE — ASSESSMENT & PLAN NOTE
Continue scheduled acetaminophen, topical aspercreme  Diclofenac discontinued as patient notes no benefit with it's use

## 2023-07-12 NOTE — ASSESSMENT & PLAN NOTE
DNR status confirmed and is documented in PC orders  POLST form provided to patient for further review

## 2023-07-12 NOTE — ASSESSMENT & PLAN NOTE
Psychosocial support provided  Symptoms well managed on sertraline 200mg daily- continue, prescriptions updated and 30 day refill sent to [facility] and 90 day supply sent to mail order pharmacy  Thyroid labs, CBC, BMP ordered

## 2023-07-12 NOTE — ASSESSMENT & PLAN NOTE
Continue myrbetriq; consider dose reduction at next visit to assess if helping with symptoms or not  Recommend scheduled toileting every 2-3 hours while awake

## 2023-07-12 NOTE — ASSESSMENT & PLAN NOTE
Intentional in setting of generalized OA  Well balanced diet recommended; ensure adequate protein intake to support muscle growth especially with participation in PT  Patient within acceptable weight range at present, further weight loss not recommended at this time

## 2023-07-13 ENCOUNTER — APPOINTMENT (EMERGENCY)
Dept: RADIOLOGY | Facility: HOSPITAL | Age: 88
DRG: 556 | End: 2023-07-13
Payer: MEDICARE

## 2023-07-13 ENCOUNTER — HOME CARE VISIT (OUTPATIENT)
Dept: HOME HEALTH SERVICES | Facility: HOME HEALTHCARE | Age: 88
End: 2023-07-13
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
## 2023-07-13 PROBLEM — R91.8 LUNG MASS: Status: ACTIVE | Noted: 2023-07-13

## 2023-07-13 LAB
ABO GROUP BLD: NORMAL
ABO GROUP BLD: NORMAL
ALBUMIN SERPL BCP-MCNC: 4.1 G/DL (ref 3.5–5)
ALP SERPL-CCNC: 46 U/L (ref 34–104)
ALT SERPL W P-5'-P-CCNC: 12 U/L (ref 7–52)
ANION GAP SERPL CALCULATED.3IONS-SCNC: 7 MMOL/L
AST SERPL W P-5'-P-CCNC: 16 U/L (ref 13–39)
BASOPHILS # BLD AUTO: 0.03 THOUSANDS/ÂΜL (ref 0–0.1)
BASOPHILS NFR BLD AUTO: 1 % (ref 0–1)
BILIRUB DIRECT SERPL-MCNC: 0.05 MG/DL (ref 0–0.2)
BILIRUB SERPL-MCNC: 0.31 MG/DL (ref 0.2–1)
BLD GP AB SCN SERPL QL: NEGATIVE
BUN SERPL-MCNC: 41 MG/DL (ref 5–25)
CALCIUM SERPL-MCNC: 9.3 MG/DL (ref 8.4–10.2)
CHLORIDE SERPL-SCNC: 102 MMOL/L (ref 96–108)
CO2 SERPL-SCNC: 34 MMOL/L (ref 21–32)
CREAT SERPL-MCNC: 0.79 MG/DL (ref 0.6–1.3)
EOSINOPHIL # BLD AUTO: 0.11 THOUSAND/ÂΜL (ref 0–0.61)
EOSINOPHIL NFR BLD AUTO: 2 % (ref 0–6)
ERYTHROCYTE [DISTWIDTH] IN BLOOD BY AUTOMATED COUNT: 14.3 % (ref 11.6–15.1)
GFR SERPL CREATININE-BSD FRML MDRD: 65 ML/MIN/1.73SQ M
GLUCOSE SERPL-MCNC: 94 MG/DL (ref 65–140)
HCT VFR BLD AUTO: 41.1 % (ref 34.8–46.1)
HGB BLD-MCNC: 13 G/DL (ref 11.5–15.4)
IMM GRANULOCYTES # BLD AUTO: 0.03 THOUSAND/UL (ref 0–0.2)
IMM GRANULOCYTES NFR BLD AUTO: 1 % (ref 0–2)
LYMPHOCYTES # BLD AUTO: 1.7 THOUSANDS/ÂΜL (ref 0.6–4.47)
LYMPHOCYTES NFR BLD AUTO: 33 % (ref 14–44)
MCH RBC QN AUTO: 30.4 PG (ref 26.8–34.3)
MCHC RBC AUTO-ENTMCNC: 31.6 G/DL (ref 31.4–37.4)
MCV RBC AUTO: 96 FL (ref 82–98)
MONOCYTES # BLD AUTO: 0.47 THOUSAND/ÂΜL (ref 0.17–1.22)
MONOCYTES NFR BLD AUTO: 9 % (ref 4–12)
NEUTROPHILS # BLD AUTO: 2.88 THOUSANDS/ÂΜL (ref 1.85–7.62)
NEUTS SEG NFR BLD AUTO: 54 % (ref 43–75)
NRBC BLD AUTO-RTO: 0 /100 WBCS
PLATELET # BLD AUTO: 160 THOUSANDS/UL (ref 149–390)
PMV BLD AUTO: 9.6 FL (ref 8.9–12.7)
POTASSIUM SERPL-SCNC: 3.8 MMOL/L (ref 3.5–5.3)
PROT SERPL-MCNC: 6.5 G/DL (ref 6.4–8.4)
RBC # BLD AUTO: 4.27 MILLION/UL (ref 3.81–5.12)
RH BLD: NEGATIVE
RH BLD: NEGATIVE
SODIUM SERPL-SCNC: 143 MMOL/L (ref 135–147)
SPECIMEN EXPIRATION DATE: NORMAL
WBC # BLD AUTO: 5.22 THOUSAND/UL (ref 4.31–10.16)

## 2023-07-13 PROCEDURE — 99497 ADVNCD CARE PLAN 30 MIN: CPT | Performed by: PHYSICIAN ASSISTANT

## 2023-07-13 PROCEDURE — 86901 BLOOD TYPING SEROLOGIC RH(D): CPT | Performed by: EMERGENCY MEDICINE

## 2023-07-13 PROCEDURE — 71045 X-RAY EXAM CHEST 1 VIEW: CPT

## 2023-07-13 PROCEDURE — 99223 1ST HOSP IP/OBS HIGH 75: CPT | Performed by: STUDENT IN AN ORGANIZED HEALTH CARE EDUCATION/TRAINING PROGRAM

## 2023-07-13 PROCEDURE — 80048 BASIC METABOLIC PNL TOTAL CA: CPT | Performed by: EMERGENCY MEDICINE

## 2023-07-13 PROCEDURE — 86900 BLOOD TYPING SEROLOGIC ABO: CPT | Performed by: EMERGENCY MEDICINE

## 2023-07-13 PROCEDURE — 97163 PT EVAL HIGH COMPLEX 45 MIN: CPT

## 2023-07-13 PROCEDURE — 97167 OT EVAL HIGH COMPLEX 60 MIN: CPT

## 2023-07-13 PROCEDURE — 93005 ELECTROCARDIOGRAM TRACING: CPT

## 2023-07-13 PROCEDURE — 72170 X-RAY EXAM OF PELVIS: CPT

## 2023-07-13 PROCEDURE — 86850 RBC ANTIBODY SCREEN: CPT | Performed by: EMERGENCY MEDICINE

## 2023-07-13 PROCEDURE — 36415 COLL VENOUS BLD VENIPUNCTURE: CPT | Performed by: EMERGENCY MEDICINE

## 2023-07-13 PROCEDURE — 85025 COMPLETE CBC W/AUTO DIFF WBC: CPT | Performed by: EMERGENCY MEDICINE

## 2023-07-13 PROCEDURE — 73552 X-RAY EXAM OF FEMUR 2/>: CPT

## 2023-07-13 PROCEDURE — 87081 CULTURE SCREEN ONLY: CPT | Performed by: INTERNAL MEDICINE

## 2023-07-13 PROCEDURE — 80076 HEPATIC FUNCTION PANEL: CPT

## 2023-07-13 RX ORDER — ATORVASTATIN CALCIUM 40 MG/1
40 TABLET, FILM COATED ORAL DAILY
Status: DISCONTINUED | OUTPATIENT
Start: 2023-07-13 | End: 2023-07-14 | Stop reason: HOSPADM

## 2023-07-13 RX ORDER — MELATONIN
1000 DAILY
Status: DISCONTINUED | OUTPATIENT
Start: 2023-07-13 | End: 2023-07-14 | Stop reason: HOSPADM

## 2023-07-13 RX ORDER — SERTRALINE HYDROCHLORIDE 100 MG/1
200 TABLET, FILM COATED ORAL DAILY
Status: DISCONTINUED | OUTPATIENT
Start: 2023-07-13 | End: 2023-07-14 | Stop reason: HOSPADM

## 2023-07-13 RX ORDER — LISINOPRIL 20 MG/1
20 TABLET ORAL DAILY
Status: DISCONTINUED | OUTPATIENT
Start: 2023-07-13 | End: 2023-07-14 | Stop reason: HOSPADM

## 2023-07-13 RX ORDER — OXYBUTYNIN CHLORIDE 5 MG/1
5 TABLET, EXTENDED RELEASE ORAL DAILY
Status: DISCONTINUED | OUTPATIENT
Start: 2023-07-13 | End: 2023-07-14 | Stop reason: HOSPADM

## 2023-07-13 RX ORDER — LANOLIN ALCOHOL/MO/W.PET/CERES
2 CREAM (GRAM) TOPICAL
Status: DISCONTINUED | OUTPATIENT
Start: 2023-07-13 | End: 2023-07-14 | Stop reason: HOSPADM

## 2023-07-13 RX ORDER — HEPARIN SODIUM 5000 [USP'U]/ML
5000 INJECTION, SOLUTION INTRAVENOUS; SUBCUTANEOUS EVERY 8 HOURS SCHEDULED
Status: DISCONTINUED | OUTPATIENT
Start: 2023-07-13 | End: 2023-07-13

## 2023-07-13 RX ORDER — OXYCODONE HCL 5 MG/5 ML
5 SOLUTION, ORAL ORAL EVERY 4 HOURS PRN
Qty: 473 ML | Refills: 0 | Status: SHIPPED | OUTPATIENT
Start: 2023-07-13 | End: 2023-07-29

## 2023-07-13 RX ORDER — ASPIRIN 81 MG/1
81 TABLET, CHEWABLE ORAL DAILY
Status: DISCONTINUED | OUTPATIENT
Start: 2023-07-13 | End: 2023-07-14 | Stop reason: HOSPADM

## 2023-07-13 RX ORDER — AMLODIPINE BESYLATE 5 MG/1
5 TABLET ORAL EVERY MORNING
Status: DISCONTINUED | OUTPATIENT
Start: 2023-07-13 | End: 2023-07-14 | Stop reason: HOSPADM

## 2023-07-13 RX ORDER — LEVOTHYROXINE SODIUM 0.07 MG/1
37.5 TABLET ORAL
Status: DISCONTINUED | OUTPATIENT
Start: 2023-07-13 | End: 2023-07-14 | Stop reason: HOSPADM

## 2023-07-13 RX ADMIN — ASPIRIN 81 MG 81 MG: 81 TABLET ORAL at 09:58

## 2023-07-13 RX ADMIN — ATORVASTATIN CALCIUM 40 MG: 40 TABLET, FILM COATED ORAL at 09:58

## 2023-07-13 RX ADMIN — HEPARIN SODIUM 5000 UNITS: 5000 INJECTION INTRAVENOUS; SUBCUTANEOUS at 06:36

## 2023-07-13 RX ADMIN — SERTRALINE 200 MG: 100 TABLET, FILM COATED ORAL at 09:58

## 2023-07-13 RX ADMIN — LISINOPRIL 20 MG: 20 TABLET ORAL at 09:58

## 2023-07-13 RX ADMIN — OXYBUTYNIN CHLORIDE 5 MG: 5 TABLET, EXTENDED RELEASE ORAL at 09:58

## 2023-07-13 RX ADMIN — LEVOTHYROXINE SODIUM 37.5 MCG: 75 TABLET ORAL at 06:36

## 2023-07-13 RX ADMIN — AMLODIPINE BESYLATE 5 MG: 5 TABLET ORAL at 09:59

## 2023-07-13 RX ADMIN — Medication 2 TABLET: at 09:59

## 2023-07-13 RX ADMIN — Medication 1000 UNITS: at 09:58

## 2023-07-13 NOTE — ED ATTENDING ATTESTATION
7/12/2023  I, Ekaterina Morrow MD, saw and evaluated the patient. I have discussed the patient with the resident/non-physician practitioner and agree with the resident's/non-physician practitioner's findings, Plan of Care, and MDM as documented in the resident's/non-physician practitioner's note, except where noted. All available labs and Radiology studies were reviewed. I was present for key portions of any procedure(s) performed by the resident/non-physician practitioner and I was immediately available to provide assistance. At this point I agree with the current assessment done in the Emergency Department. I have conducted an independent evaluation of this patient a history and physical is as follows: Patient is a 80year old female whose knee gave out tonight and patient fell and struck head and R hip pain. (+) headache. Patient is on ASA. No LOC. Was last seen at [facility] on 7/11/23 for hypothyroidism. [facility] website checked on this patient and no Rx found. NCAT. PERRL. Moist mucous membranes. C-collar in place. Lungs clear. Heart regular without murmur. Nontender chest wall. Abdomen soft with ?diffuse tenderness. Good bowel sounds. (+) R hip tenderness. No edema. No focal deficits. DDx including but not limited to: intracranial injury, concussion, cervical injury, intrathoracic injury, intraabdominal injury, extremity injury--fracture, dislocation, strain, sprain, contusion. Will check labs, EKG, x-rays and CTs.      ED Course         Critical Care Time  Procedures

## 2023-07-13 NOTE — ASSESSMENT & PLAN NOTE
Presented w/ a fall w/ head strike and No LOC  Trauma work up negative for any sustained trauma  Imaging revealed a spiculated 7cm lung mass     Pain control   PT/ OT  Further work up for mass deferred (see above)

## 2023-07-13 NOTE — PROGRESS NOTES
[address] [facility]  Progress Note  Name: Alaina Gilliland  MRN: [id number]  Unit/Bed#: S -01 I Date of Admission: 7/12/2023   Date of Service: 7/13/2023 I Hospital Day: 0    Post admit check:    S: Patient feels okay, has some arthritic pain. Denies shortness of breath presently. I had extensive goals of care conversation with the patient today and she is aware that she has incidentally found lung cancer. She does not want aggressive or invasive work-up at this point. She wants to return home and be on hospice and comfortable as possible. Given her wishes we will consult the hospice liaison. This was also discussed with her power of . O:    Vitals:    07/13/23 0500   BP: 149/71   Pulse:    Resp:    Temp:    SpO2:         Physical Exam  Vitals and nursing note reviewed. Constitutional:       General: She is not in acute distress. Appearance: Normal appearance. HENT:      Head: Normocephalic. Mouth/Throat:      Mouth: Mucous membranes are moist.   Eyes:      General: No scleral icterus. Pupils: Pupils are equal, round, and reactive to light. Cardiovascular:      Rate and Rhythm: Normal rate and regular rhythm. Heart sounds: No murmur heard. Pulmonary:      Effort: Pulmonary effort is normal. No respiratory distress. Breath sounds: Normal breath sounds. No wheezing, rhonchi or rales. Abdominal:      General: Bowel sounds are normal. There is no distension. Palpations: Abdomen is soft. Tenderness: There is no abdominal tenderness. Musculoskeletal:         General: No swelling. Right lower leg: No edema. Left lower leg: No edema. Skin:     Capillary Refill: Capillary refill takes less than 2 seconds. Neurological:      General: No focal deficit present. Mental Status: She is alert and oriented to person, place, and time. Mental status is at baseline.           Assessment/Plan   * Lung mass  Assessment & Plan  Incidental finding on imaging during trauma work up  7cm spiculated mass. Patient does not want aggressive treatment or management at this point. We will consult inpatient hospice liaison  Comfort measures only    Fall  Assessment & Plan  Presented w/ a fall w/ head strike and No LOC  Trauma work up negative for any sustained trauma  Imaging revealed a spiculated 7cm lung mass     Pain control   PT/ OT  Further work up for mass deferred (see above)    Acquired hypothyroidism  Assessment & Plan  Continue levothyroxine     Ambulatory dysfunction  Assessment & Plan  PT / OT to return to [facility]    AAA (abdominal aortic aneurysm) without rupture (HCC)  Assessment & Plan  3.1 cm diameter.     Essential hypertension  Assessment & Plan  Goal <140/90; ideally 130/80 in setting of AAA  Avoid hypotension  Continue amlodipine, benazepril = lisinopril in house Detail Level: Simple Initiate Treatment: Addy(patient never received medication, refilled to [place] pharmacy) Action 1: Continue Continue Regimen: Clobetasol Scalp Solution (refilled)\\n\\nClobetasol cream bid X3-4 weeks,  prn \\n\\nTaclonex (PA is to be done for approval) Plan: Patient is going to discuss biologic treatment with oncologist although most likely will not be an option because of history of leukemia, patient is still taking chemotherapy medication. Provided reading material for cosentyx in case her oncologist wanted to look over. Detail Level: Zone

## 2023-07-13 NOTE — ASSESSMENT & PLAN NOTE
Incidental finding on imaging during trauma work up  7cm spiculated mass. Patient does not want aggressive treatment or management at this point.   We will consult inpatient hospice liaison  Comfort measures only

## 2023-07-13 NOTE — CASE MANAGEMENT
Case Management Progress Note    Patient name Shahida Solorio  Location S /S -01 MRN [id number]  : 1931 Date 2023       LOS (days): 0  Geometric Mean LOS (GMLOS) (days):   Days to GMLOS:        OBJECTIVE:        Current admission status: Observation  Preferred Pharmacy:   [address] [facility], [address]  [address] [address]  Phone: [phone] Fax: [phone]    [facility]  for [address] [facility], [address],  [facility],[address]  [facility] [address]  Phone: [phone] Fax: [phone]  [facility], [address] [facility] [address]  [facility] [address]  Phone: [phone] Fax: [phone]    Primary Care Provider: Rasta Snyder DO    Primary Insurance: MEDICARE  Secondary Insurance: [facility]    PROGRESS NOTE:    PT/OT evals obtained. CM called [facility] and s/w Mary Ellen to review. Per Kevyn Tolliver, patient was independent with ADLs PTA and at time requires min assist x 1. Per review, patient will need to return to skilled unit at [facility]. Referral opened to [facility] via [facility] for review. Plan for patient to return to [facility] with [facility] pending confirmed bed at facility.

## 2023-07-13 NOTE — OCCUPATIONAL THERAPY NOTE
Occupational Therapy Evaluation     Patient Name: Noemy YADAV Date: 7/13/2023  Problem List  Principal Problem:    Lung mass  Active Problems:    Essential hypertension    AAA (abdominal aortic aneurysm) without rupture (HCC)    Ambulatory dysfunction    Acquired hypothyroidism    Fall    Past Medical History  Past Medical History:   Diagnosis Date    Cyst of soft tissue 12/10/2021    Disorder of bone     Edema     Essential hypertension     Hyperlipemia     Hyperlipidemia     Hypertension     Left shoulder pain 12/10/2021     Past Surgical History  Past Surgical History:   Procedure Laterality Date    FL INJECTION RIGHT HIP (NON ARTHROGRAM)  6/2/2021    FL INJECTION RIGHT HIP (NON ARTHROGRAM)  5/30/2023 07/13/23 1007   OT Last Visit   OT Visit Date 07/13/23   Note Type   Note type Evaluation   Pain Assessment   Pain Assessment Tool FLACC   Pain Rating: FLACC (Rest) - Face 0   Pain Rating: FLACC (Rest) - Legs 0   Pain Rating: FLACC (Rest) - Activity 0   Pain Rating: FLACC (Rest) - Cry 0   Pain Rating: FLACC (Rest) - Consolability 0   Score: FLACC (Rest) 0   Pain Rating: FLACC (Activity) - Face 1   Pain Rating: FLACC (Activity) - Legs 1   Pain Rating: FLACC (Activity) - Activity 0   Pain Rating: FLACC (Activity) - Cry 1   Pain Rating: FLACC (Activity) - Consolability 0   Score: FLACC (Activity) 3   Restrictions/Precautions   Weight Bearing Precautions Per Order No   Other Precautions Cognitive; Chair Alarm; Bed Alarm;Multiple lines; Fall Risk;Pain   Home Living   Type of Home Assisted living  ([facility] (personal care))   Home Layout One level; Access   [employer] Walk-in shower  (showers 2x/week with staff)   Home Equipment Wheelchair-manual;Walker  (rollator, manual W/C)   Prior Function   Level of [place] Needs assistance with ADLs; Needs assistance with functional mobility; Needs assistance with [address] [place] [address] Rd staff   Receives Help From Personal care attendant  (reports recieving OT/PT at facility)   IADLs   (managed by facility)   Falls in the last 6 months 1 to 4  (admitted d/t fall, denies other recent falls but endorses + fall hx (10 mo ago ))   Vocational Retired   Comments Pt is a questionable historian. Reports ambulating transfers to/from W/C with use of rollator with supervision. A with ADLs. Reports unable to self propel W/C, but reports "working on that with therapy". Reports yesterday was her first day working with therapy? questionable accuracy to information, will f/u with CM   Lifestyle   Autonomy Pt is a resident at [facility]. A for ADLs, IADLs. supervision c rollator for short transfers. [employer] supportive staff,   Service to Others retired   General   Additional Pertinent History Pt admitted s/p fall, indidental findings of lung mass during work up imaging. Hx of AAA without rupture. Additional General Comments (S)  Pt requesting to transition to Level 4 comfort care d/t findings of masses and possible malignancies. However, OT/PT requested to evaluate appropriateness to return to personal care home vs SNF at time of D/C   ADL   Where Assessed Other (Comment)  (BS)   Eating Assistance 5  Supervision/Setup   Grooming Assistance 4  Minimal Assistance   UB Bathing Assistance 4  Minimal Assistance    [address] 3  Moderate Assistance   [address] 2  Maximal [address]  2  Maximal [address] Deficit Perineal hygiene; Bedside commode; Increased time to complete;Supervison/safety;Verbal cueing;Setup;Steadying  (total A pericare from 3rd staff with Max A x2 steadying.)   Functional Assistance 2  Maximal Assistance  (x2)   Additional Comments Did not observe eating, grooming, UB bathing, LB bathing and UB dressing at time of evaluation, with use of clinical reasoning, pt's performance throughout evaluation indicates that pt may be able to perform these tasks at the levels listed above   Bed Mobility   Additional Comments pt seated at EOB upon arrival with nursing staff. Seated OOB in recliner at end of session   Transfers   Sit to Stand 2  Maximal assistance   Additional items Assist x 2; Increased time required;Verbal cues   Stand to Sit 2  Maximal assistance   Additional items Assist x 2; Increased time required;Verbal cues   Stand pivot 2  Maximal assistance   Additional items Assist x 2; Increased time required;Verbal cues   Toilet transfer 2  Maximal assistance   Additional items Assist x 2; Increased time required;Verbal cues; Commode   Additional Comments Multiple attempts for sit<>stand transfers both with and without RW prior to successful completion. Pt resistant to therapist recommendations for body mechanics and hand placements with difficulty redirecting. Notably anxious during mobility tasks with fear of falling. Pt requires Max A x2 with RW for short ambulating transfer to [facility] from EOB. Impulsive descent to sitting surface with poor proximity, cues and physical assistance needed for hand placements on armrests. Elected for chair swap via sit<>stand transfers to transfer [facility] > recliner. Functional Mobility   Functional Mobility   (JING d/t decreased balance, activity tolerance)   Balance   Static Sitting Fair   Dynamic Sitting Fair -   Static Standing Poor -  (Max A x 2)   Dynamic Standing Poor -   Activity Tolerance   Activity Tolerance Patient limited by fatigue  (anxiety re: mobility tasks , cognition)   Medical Staff [id number] coordination c PT Patrick Mealing.  VINAY Fields PA-C [facility]   Nurse Made Aware GIORGI Brannon present throughout session   RUE Assessment   RUE Assessment WFL  (MMT grossly 4/5 based on functional assessment)   LUE Assessment   LUE Assessment WFL  (MMT grossly 4/5 based on functional assessment)   Hand Function   Gross Motor Coordination Impaired   Fine Motor Coordination Impaired   Sensation Light Touch No apparent deficits   Cognition   Overall Cognitive Status WFL   Arousal/Participation Alert   Attention Difficulty attending to directions   Orientation Level Oriented X4  (+ month / year)   Memory Decreased recall of precautions;Decreased recall of recent events   Following Commands Follows one step commands with increased time or repetition   Comments Pt agreeable to OT session. Poor application of therapist techniques throughout session. quiestionable accuracy of home set up / PLOF information. Assessment   Limitation Decreased ADL status; Decreased UE strength;Decreased cognition;Decreased Safe judgement during ADL;Decreased endurance;Decreased self-care trans;Decreased high-level ADLs  (decreased activity tolerance, decreased balance, + pain)   Prognosis Good   Assessment Patient is a 80 y.o. female seen for OT evaluation at [facility] following admission on 7/12/2023  s/p Lung mass. Please see above for comprehensive list of comorbidities and significant PMHx impacting functional performance. At baseline, pt requires A with ADL/IADLs, supervision for short distance transfers c rollator to W/C. Upon initial evaluation, pt appears to be performing below baseline functional status. Occupational performance is affected by the following deficits: endurance ,  decreased muscular strength , decreased standing tolerance for self care tasks , decreased dynamic balance impacting functional reach, decreased trunk control , attention to task, impaired judgement and problem solving , impaired safety awareness  and (+) pain . Personal/Environmental factors impacting D/C include: (+) Hx of falls , Assistance needed for ADLs and functional mobility and High fall risk .  Supporting factors include: facility staff available for continued assistance and 24/7 supervision available  Patient would benefit from OT services within the acute care setting to maximize level of functional independence in the following areas self-care transfers, functional mobility and ADLs. From OT standpoint, recommendation at time of D/C would be return to facility with rehabilitation . Goals   Patient Goals to get OOB to [facility]. Plan   Treatment Interventions ADL retraining;Functional transfer training;UE strengthening/ROM; Endurance training;Equipment evaluation/education;Patient/family training; Compensatory technique education; Activityengagement;Continued evaluation   Goal Expiration Date 07/23/23   OT Treatment Day 0   OT Frequency 3-5x/wk   Recommendation   OT Discharge Recommendation Return to facility with rehabilitation services  (D/t need for increased assistance with all mobility and ADL tasks, recommending skilled facility at D/C for rehabilitation rather than return to [facility].)   AM-PAC Daily Activity Inpatient   Lower Body Dressing 2   Bathing 2   Toileting 2   Upper Body Dressing 3   Grooming 3   Eating 3   Daily Activity Raw Score 15   Daily Activity Standardized Score (Calc for Raw Score >=11) 34.69   AM-PAC Applied Cognition Inpatient   Following a Speech/Presentation 3   Understanding Ordinary Conversation 3   Taking Medications 2   Remembering Where Things Are Placed or Put Away 3   Remembering List of 4-5 Errands 2   Taking Care of Complicated Tasks 2   Applied Cognition Raw Score 15   Applied Cognition Standardized Score 33.54   End of Consult   Patient Position at End of Consult Bedside chair;Bed/Chair alarm activated; All needs within reach   Nurse Communication Nurse aware of consult  Casandra Brannon present at bedside)   End of Consult Comments The patient's raw score on the AM-PAC Daily Activity Inpatient Short Form is 15. A raw score of less than 19 suggests the patient may benefit from discharge to post-acute rehabilitation services. Please refer to the recommendation of the Occupational Therapist for safe discharge planning.    Goals established on initial evaluation in order to achieve pt's goal of getting OOB to [facility]SC and recliner      Pt will complete UB ADLs Supervision  for increased ADL independence within 10 days. Pt will complete LB ADLs Mod A   for increased ADL independence within 10 days. Pt will complete toileting Mod A   with use of DME for increased ADL independence within 10 days. Pt will demonstrate proper body mechanics to complete self-care transfers  with Mod A  x1 and use of LRAD for increased safety and functional independence within 10 days. Pt will demonstrate standing tolerance of 2 min with Mod A  and use of LRAD for increased activity tolerance during ADL/IADL tasks within 10 days. Pt will complete bed mobility Mod A  for increased independence in repositioning, pressure offloading, and managing comfort. Pt will demonstrate proper body mechanics and fall prevention strategies during 100% of tx sessions for increased safety awareness during ADL/IADLs    Pt will demonstrate activity tolerance of 30 min in therapeutic tasks for increased participation in meaningful activities upon D/C. Pt will participate in ongoing cognitive assessments to assist with safe D/C planning and supervision/assistance recommendations. Pt will demonstrate OOB sitting tolerance of 2-4 hr/day for increased activity tolerance and engagement in leisure activities within 10 days. Pt benefited from co-session of skilled OT and PT therapists in order to most appropriately address functional deficits d/t extensive physical assistance required for safe mobility  and decreased activity tolerance. OT/PT objectives were addressed separately; please see PT note for specific goal areas targeted.     Sumner Bosworth, OT

## 2023-07-13 NOTE — DISCHARGE SUMMARY
[address]  Discharge- Meg Lu 4/9/1931, 80 y.o. female MRN: [id number]  Unit/Bed#: S -01 Encounter: [id number]  Primary Care Provider: Dang Sierra DO   Date and time admitted to hospital: 7/12/2023 11:36 PM    * Lung mass  Assessment & Plan  Incidental finding on imaging during trauma work up  7cm spiculated mass. Patient does not want aggressive treatment or management at this point. She is transitioning to hospice at [facility] measures only    [address]  Presented w/ a fall w/ head strike and No LOC  Trauma work up negative for any sustained trauma  Imaging revealed a spiculated 7cm lung mass     Pain control   PT/ OT  Further work up for mass deferred (see above)    Acquired hypothyroidism  Assessment & Plan  Continue levothyroxine     Ambulatory dysfunction  Assessment & Plan  PT / OT to return to [facility]    AAA (abdominal aortic aneurysm) without rupture (HCC)  Assessment & Plan  3.1 cm diameter.     Essential hypertension  Assessment & Plan  Goal <140/90; ideally 130/80 in setting of AAA  Avoid hypotension  Continue amlodipine, benazepril = lisinopril in house    Medical Problems     Resolved Problems  Date Reviewed: 7/13/2023   None       Discharging Physician / Practitioner: Erica Erazo PA-C  PCP: Dang Sierra DO  Admission Date:   Admission Orders (From admission, onward)     Ordered        07/13/23 1309  Inpatient Admission  Once            07/13/23 0319  Place in Observation  Once                      Discharge Date: 07/14/23     Consultations During Hospital Stay:  · Hospice liason    Procedures Performed:   · none    Significant Findings / Test Results:   · 7 cm spiculated lung mass on CT imaging    Incidental Findings:   · none     Test Results Pending at Discharge (will require follow up):   · none     Outpatient Tests Requested:  · none    Complications:  none    Reason for Admission: fall    Hospital Course: Hanna Childress is a 80 y.o. female patient who originally presented to the hospital on 7/12/2023 due to fall [facility]. Incidentally trauma imaging revealed a 7 cm spiculated lung mass. This was a new diagnosis for the patient. No other traumatic injuries were identified upon admission. I had a goals of care conversation with the patient regarding her likely cancer diagnosis and she reports she does not want any aggressive or invasive work-ups or further testing. She is opting for hospice at this time. Hospice liaison was consulted, she is okay to return back to [facility] later today on hospice. Please see above list of diagnoses and related plan for additional information. Condition at Discharge: poor    Discharge Day Visit / Exam:   Subjective:  nad  Vitals: Blood Pressure: 149/71 (07/13/23 0500)  Pulse: 74 (07/13/23 0300)  Temperature: 98.1 °F (36.7 °C) (07/13/23 0413)  Temp Source: Oral (07/12/23 2342)  Respirations: 18 (07/13/23 0300)  SpO2: 93 % (07/13/23 0300)  Exam:   Physical Exam  Vitals and nursing note reviewed. Constitutional:       General: She is not in acute distress. Appearance: Normal appearance. HENT:      Head: Normocephalic. Mouth/Throat:      Mouth: Mucous membranes are moist.   Eyes:      General: No scleral icterus. Pupils: Pupils are equal, round, and reactive to light. Cardiovascular:      Rate and Rhythm: Normal rate and regular rhythm. Heart sounds: No murmur heard. Pulmonary:      Effort: Pulmonary effort is normal. No respiratory distress. Breath sounds: Normal breath sounds. No wheezing, rhonchi or rales. Abdominal:      General: Bowel sounds are normal. There is no distension. Palpations: Abdomen is soft. Tenderness: There is no abdominal tenderness. Musculoskeletal:         General: No swelling. Right lower leg: No edema. Left lower leg: No edema.    Skin:     Capillary Refill: Capillary refill takes less than 2 seconds. Neurological:      General: No focal deficit present. Mental Status: She is alert and oriented to person, place, and time. Mental status is at baseline. Discussion with Family: Patient declined call to . Discharge instructions/Information to patient and family:   See after visit summary for information provided to patient and family. Provisions for Follow-Up Care:  See after visit summary for information related to follow-up care and any pertinent home health orders. Disposition:   Other [address] at [address]    Planned Readmission: none     Discharge Statement:  I spent 45 minutes discharging the patient. This time was spent on the day of discharge. I had direct contact with the patient on the day of discharge. Greater than 50% of the total time was spent examining patient, answering all patient questions, arranging and discussing plan of care with patient as well as directly providing post-discharge instructions. Additional time then spent on discharge activities. Discharge Medications:  See after visit summary for reconciled discharge medications provided to patient and/or family.       **Please Note: This note may have been constructed using a voice recognition system**

## 2023-07-13 NOTE — CASE MANAGEMENT
Case Management Discharge Planning Note    Patient name Katheryn Mcmahon  Location S /S -70 MRN [id number]  : 1931 Date 2023       Current Admission Date: 2023  Current Admission Diagnosis:Fall   Patient Active Problem List    Diagnosis Date Noted   • Fall 2023   • Lung mass 2023   • Weight loss 2023   • Goals of care, counseling/discussion 2023   • Rotator cuff arthropathy of left shoulder 2023   • Weakness of both lower extremities 2022   • Primary osteoarthritis involving multiple joints 2022   • Weakness of left lower extremity 2022   • Cyst of soft tissue 12/10/2021   • Left shoulder pain 12/10/2021   • Edema of left foot 2021   • Skin lesion 2021   • Primary osteoarthritis of one hip, right 2020   • Greater trochanteric bursitis of right hip 2020   • Primary osteoarthritis of right knee 2020   • Osteopenia after menopause 01/15/2019   • Overactive bladder 2018   • Ambulatory dysfunction 2018   • Right hip pain 2018   • Other hyperlipidemia 2018   • AAA (abdominal aortic aneurysm) without rupture ([address]) 2018   • Depression 2018   • Essential hypertension 2018   • Stress incontinence of urine 2018   • Acquired hypothyroidism 2017      LOS (days): 0  Geometric Mean LOS (GMLOS) (days):   Days to GMLOS:     OBJECTIVE:            Current admission status: Observation   Preferred Pharmacy:   [address] [facility] [facility] [address] [place], [place] - [facility] [place]  [address] [address]  Phone: [phone] Fax: [phone]    [facility]  for NAWAF Segura Skip Sandoval, [address] [facility] - [address] [facility]  [place] [address]  Phone: [phone] Fax: [phone]  [facility], [address] [facility],[address]  [address] [facility]  [address] [address] [place] [address]  Phone: [phone] Fax: [phone]    Primary Care Provider: Madonna Chang DO    Primary Insurance: MEDICARE  Secondary Insurance: [facility]    DISCHARGE DETAILS:    Discharge planning discussed with[de-identified] Patient, Rabia Garcia - jaimie and Brandi Ott from [address] [place] [address] of Choice: Yes  Comments - Freedom of Choice: CM discussed discharge plans per care team recommendations. As per patient and Rabia etienne - the plan is to return to [facility] with [address] CM reached out to social Breann worker for their personal care facility and she's requesting a PT eval to ensure they can meet her needs, if not she'll admit to their skilled unit. As per Brandi Ott, they have a contract with [facility] - referral made. CM will follow up after therapy eval is completed. CM contacted family/caregiver?: Yes  Were Treatment Team discharge recommendations reviewed with patient/caregiver?: Yes  Did patient/caregiver verbalize understanding of patient care needs?: N/A- going to facility  Were patient/caregiver advised of the risks associated with not following Treatment Team discharge recommendations?: Yes    Contacts  Patient Contacts: Rabia Garcia  Relationship to Patient[de-identified] Family  Contact Method: Phone  Phone Number: See face sheet  Reason/Outcome: Discharge [address] [facility] [address]         Is the patient interested in [facility] at discharge?: No    DME Referral Provided  Referral made for DME?: No    Other Referral/Resources/Interventions Provided:  Interventions: Hospice  Referral Comments: CM discussed discharge plans per care team recommendations. As per patient and zulmatavoRabia - the plan is to return to [facility] with [address] CM reached out to social zaheer Crain for their personal care facility and she's requesting a PT eval to ensure they can meet her needs, if not she'll admit to their skilled unit. As per Brandi Ott, they have a contract with [facility] - referral made. CM will follow up after therapy eval is completed.     Would you like to participate in our [address] service program?  : No - Declined    Treatment Team Recommendation: Assisted Living, Hospice  Discharge Destination Plan[de-identified] Hospice

## 2023-07-13 NOTE — PLAN OF CARE
Problem: MOBILITY - ADULT  Goal: Maintain or return to baseline ADL function  Description: INTERVENTIONS:  -  Assess patient's ability to carry out ADLs; assess patient's baseline for ADL function and identify physical deficits which impact ability to perform ADLs (bathing, care of mouth/teet  Problem: PAIN - ADULT  Goal: Verbalizes/displays adequate comfort level or baseline comfort level  Description: Interventions:  - Encourage patient to monitor pain and request assistance  - Assess pain using appropriate pain scale  - Administer analgesics based on type and severity of pain and evaluate response  - Implement non-pharmacological measures as appropriate and evaluate response  - Consider cultural and social influences on pain and pain management  - Notify physician/advanced practitioner if interventions unsuccessful or patient reports new pain  Outcome: Progressing     Problem: INFECTION - ADULT  Goal: Absence or prevention of progression during hospitalization  Description: INTERVENTIONS:  - Assess and monitor for signs and symptoms  Problem: DISCHARGE PLANNING  Goal: Discharge to home or other facility with appropriate resources  Description: INTERVENTIONS:  - Identify barriers to discharge w/patient and caregiver  - Arrange for needed discharge resources and transportation as appropriate  - Identify discharge learning needs (meds, wound care, etc.)  - Arrange for interpretive services to assist at discharge as needed  - Refer to Case Management Department for coordinating discharge planning if the patient needs post-hospital services based on physician/advanced practitioner order or complex needs related to functional status, cognitive ability, or social support system  Outcome: Progressing    of infection  - Monitor lab/diagnostic results  - Monitor all insertion sites, i.e. indwelling lines, tubes, and drains  - Monitor endotracheal if appropriate and nasal secretions for changes in amount and color  - [place] appropriate cooling/warming therapies per order  - Administer medications as ordered  - Instruct and encourage patient and family to use good hand hygiene technique  - Identify and instruct in appropriate isolation precautions for identified infection/condition  Outcome: Progressing   h, toileting, grooming, dressing, etc.)  - Assess/evaluate cause of self-care deficits   - Assess range of motion  - Assess patient's mobility; develop plan if impaired  - Assess patient's need for assistive devices and provide as appropriate  - Encourage maximum independence but intervene and supervise when necessary  - Involve family in performance of ADLs  - Assess for home care needs following discharge   - Consider OT consult to assist with ADL evaluation and planning for discharge  - Provide patient education as appropriate  Outcome: Progressing

## 2023-07-13 NOTE — CASE MANAGEMENT
Case Management Discharge Planning Note    Patient name Wilfredo Mcclure  Location S /S -29 MRN [id number]  : 1931 Date 2023       Current Admission Date: 2023  Current Admission Diagnosis:Lung mass   Patient Active Problem List    Diagnosis Date Noted   • Fall 2023   • Lung mass 2023   • Weight loss 2023   • Goals of care, counseling/discussion 2023   • Rotator cuff arthropathy of left shoulder 2023   • Weakness of both lower extremities 2022   • Primary osteoarthritis involving multiple joints 2022   • Weakness of left lower extremity 2022   • Cyst of soft tissue 12/10/2021   • Left shoulder pain 12/10/2021   • Edema of left foot 2021   • Skin lesion 2021   • Primary osteoarthritis of one hip, right 2020   • Greater trochanteric bursitis of right hip 2020   • Primary osteoarthritis of right knee 2020   • Osteopenia after menopause 01/15/2019   • Overactive bladder 2018   • Ambulatory dysfunction 2018   • Right hip pain 2018   • Other hyperlipidemia 2018   • AAA (abdominal aortic aneurysm) without rupture ([address]) 2018   • Depression 2018   • Essential hypertension 2018   • Stress incontinence of urine 2018   • Acquired hypothyroidism 2017      LOS (days): 0  Geometric Mean LOS (GMLOS) (days):   Days to GMLOS:     OBJECTIVE:            Current admission status: Inpatient   Preferred Pharmacy:   [address] [facility] [place] [facility] [address] [place], [place] - [facility]  [address] [address]  Phone: [phone] Fax: [phone]    [facility]  for [facility],  [address] - [address] [facility],[address]  [facility] [address]  Phone: [phone] Fax: [phone]  [facility], [address] [facility]  [address] [facility],[address]  [address] [place] [address]  Phone: [phone] Fax: [phone]    Primary Care Provider: Mart Rush DO    Primary Insurance: MEDICARE  Secondary Insurance: [facility]    DISCHARGE DETAILS:    Discharge planning discussed with[de-identified] Patient, niece, and S  [place] of Choice: Yes  Comments - Freedom of Choice: Choice is to return to [facility]. CM contacted family/caregiver?: Yes  Were Treatment Team discharge recommendations reviewed with patient/caregiver?: Yes  Did patient/caregiver verbalize understanding of patient care needs?: N/A- going to facility  Were patient/caregiver advised of the risks associated with not following Treatment Team discharge recommendations?: Yes    Contacts  Patient Contacts: Kerri Perez  Relationship to Patient[de-identified] Family  Contact Method: Phone  Phone Number: [phone]  Reason/Outcome: Emergency Contact, Discharge [address]         Is the patient interested in [address] at discharge?: No    DME Referral Provided  Referral made for DME?: No    Other Referral/Resources/Interventions Provided:  Interventions: Hospice, SNF    Would you like to participate in our [facility] service program?  : No - Declined    Treatment Team Recommendation: SNF, Hospice  Discharge Destination Plan[de-identified] Hospice, SNF ([facility] SNF with  Hospice.)  Transport at Discharge : S Ambulance  Dispatcher Contacted: Yes  Number/Name of Dispatcher: ROUNDTRIP  Transported by [employer] and Unit #):  PENDING  ETA of Transport (Date): 07/14/23  ETA of Transport (Time):  (PENDING FOR 1000 OR LATER)    Accepting Facility Name, [place] & State : Sharlyne Conception [place], [place]  Receiving Facility/Agency Phone Number: [phone]  Facility/Agency Fax Number: [phone] [facility], CHUYW, ACSW, ACDANYA, CCM  07/13/23 1:46 PM

## 2023-07-13 NOTE — PLAN OF CARE
Problem: OCCUPATIONAL THERAPY ADULT  Goal: Performs self-care activities at highest level of function for planned discharge setting. See evaluation for individualized goals. Description: Treatment Interventions: ADL retraining, Functional transfer training, UE strengthening/ROM, Endurance training, Equipment evaluation/education, Patient/family training, Compensatory technique education, Activityengagement, Continued evaluation          See flowsheet documentation for full assessment, interventions and recommendations. Note: Limitation: Decreased ADL status, Decreased UE strength, Decreased cognition, Decreased Safe judgement during ADL, Decreased endurance, Decreased self-care trans, Decreased high-level ADLs (decreased activity tolerance, decreased balance, + pain)  Prognosis: Good  Assessment: Patient is a 80 y.o. female seen for OT evaluation at [facility] following admission on 7/12/2023  s/p Lung mass. Please see above for comprehensive list of comorbidities and significant PMHx impacting functional performance. At baseline, pt requires A with ADL/IADLs, supervision for short distance transfers c rollator to W/C. Upon initial evaluation, pt appears to be performing below baseline functional status. Occupational performance is affected by the following deficits: endurance ,  decreased muscular strength , decreased standing tolerance for self care tasks , decreased dynamic balance impacting functional reach, decreased trunk control , attention to task, impaired judgement and problem solving , impaired safety awareness  and (+) pain . Personal/Environmental factors impacting D/C include: (+) Hx of falls , Assistance needed for ADLs and functional mobility and High fall risk .  Supporting factors include: facility staff available for continued assistance and 24/7 supervision available  Patient would benefit from OT services within the acute care setting to maximize level of functional independence in the following areas self-care transfers, functional mobility and ADLs. From OT standpoint, recommendation at time of D/C would be return to facility with rehabilitation .      OT Discharge Recommendation: Return to facility with rehabilitation services (D/t need for increased assistance with all mobility and ADL tasks, recommending skilled facility at D/C for rehabilitation rather than return to [facility].)     Marty Gerard, OT

## 2023-07-13 NOTE — ED PROVIDER NOTES
Emergency Department Trauma Note  Melida Lopez 80 y.o. female MRN: [id number]  Unit/Bed#: ED-03/ED-03 Encounter: [id number]      Trauma Alert: Trauma Acuity: C  Model of Arrival:   via    Trauma Team: Current Providers  Attending Provider: Addison Kahn MD  Attending Provider: Suzi Rooney MD  Resident: Rich Martinez DO  Registered Nurse: Concepcion Moser RN  Advanced Practitioner: Stephania Sam PA-C  Consultants:     None      History of Present Illness     Chief Complaint:   Chief Complaint   Patient presents with   • Fall     Patient presents as Trauma C for fall from standing, her knee gave out. HPI:  Melida Lopez is a 80 y.o. female who presents with a complaint of fall with head strike on aspirin. Mechanism:Details of Incident: Fall from standing Injury Date: 07/12/23 Injury Time: 2338 Injury Occurence Location - [address] [place] [address]: [place]    The patient is a 77-year-old female with a past medical history of hypertension, hyperlipidemia and abdominal aortic aneurysm currently only taking aspirin who presents to the emergency department after a fall from standing position. The patient states she got up to go to the bathroom when her "knee gave out" and she fell onto her right side striking the right side of her head and right hip on a concrete floor. She denied loss of consciousness and states that she takes aspirin daily. The patient reports that she currently has right hip pain as well as neck pain. She denied change in vision, lightheadedness, chest pain, shortness of breath, nausea, vomiting, abdominal pain, diarrhea, numbness, tingling, weakness, hematuria, dysuria, rash or fever. Review of Systems   Constitutional: Negative for chills, fatigue and fever. HENT: Negative for congestion, ear pain and sore throat. Eyes: Negative for photophobia, pain and visual disturbance. Respiratory: Negative for cough, shortness of breath, wheezing and stridor. Cardiovascular: Negative for chest pain, palpitations and leg swelling. Gastrointestinal: Negative for abdominal distention, abdominal pain, constipation, diarrhea, nausea and vomiting. Endocrine: Negative. Genitourinary: Negative for dysuria and hematuria. Musculoskeletal: Positive for arthralgias and neck pain. Negative for back pain and neck stiffness. Skin: Negative for color change and rash. Allergic/Immunologic: Negative. Neurological: Positive for headaches. Negative for dizziness, seizures, syncope, weakness, light-headedness and numbness. Hematological: Negative. Psychiatric/Behavioral: Negative. All other systems reviewed and are negative. Historical Information     Immunizations:   Immunization History   Administered Date(s) Administered   • COVID-19 MODERNA VACC 0.5 ML IM 02/09/2021, 03/09/2021, 11/11/2021   • Influenza Quadrivalent, 6-35 Months IM 10/14/2015   • Influenza, high dose seasonal 0.7 mL 10/29/2019, 09/29/2020   • Tdap 04/14/2016   • Zoster 12/03/2013       Past Medical History:   Diagnosis Date   • Cyst of soft tissue 12/10/2021   • Disorder of bone    • Edema    • Essential hypertension    • Hyperlipemia    • Hyperlipidemia    • Hypertension    • Left shoulder pain 12/10/2021       Family History   Family history unknown: Yes     Past Surgical History:   Procedure Laterality Date   • FL INJECTION RIGHT HIP (NON ARTHROGRAM)  6/2/2021   • FL INJECTION RIGHT HIP (NON ARTHROGRAM)  5/30/2023     Social History     Tobacco Use   • Smoking status: Former   • Smokeless tobacco: Never   Substance Use Topics   • Alcohol use: Yes     Alcohol/week: 7.0 standard drinks of alcohol     Types: 7 Glasses of wine per week   • Drug use: No     E-Cigarette/Vaping     E-Cigarette/Vaping Substances       Family History: non-contributory    Meds/Allergies   Prior to Admission Medications   Prescriptions Last Dose Informant Patient Reported? Taking?    Acetaminophen Extra Strength 500 MG tablet   No No   Sig: TAKE 2 TABLETS EVERY 12 HOURS   Aspirin Low Dose 81 MG EC tablet   No No   Sig: TAKE 1 TABLET DAILY   Calcium Carbonate-Vit D-Min (CALCIUM 1200 PO)  Self Yes No   Sig: Take by mouth Take 2 tablets daily   Lidocaine HCl (Aspercreme Lidocaine) 4 % CREA   Yes No   Sig: Apply topically 4 (four) times a day as needed   Myrbetriq 50 MG TB24  Self No No   Sig: TAKE 1 TABLET DAILY   Polyvinyl Alcohol-Povidone (Artificial Tears) 5-6 MG/ML SOLN   Yes No   Sig: Apply to eye every 4 (four) hours as needed   amLODIPine (NORVASC) 5 mg tablet  Self No No   Sig: TAKE 1 TABLET IN THE MORNING   aspirin 81 mg chewable tablet  Self Yes No   Sig: Chew 81 mg daily   atorvastatin (LIPITOR) 40 mg tablet  Self No No   Sig: TAKE 1 TABLET DAILY   benazepril (LOTENSIN) 20 mg tablet   No No   Sig: Take 1 tablet (20 mg total) by mouth daily   cholecalciferol (VITAMIN D3) 1,000 units tablet   No No   Sig: Take 1 tablet (1,000 Units total) by mouth daily   levothyroxine 25 mcg tablet   No No   Sig: TAKE ONE AND ONE-HALF TABLETS DAILY   sertraline (ZOLOFT) 100 mg tablet   No No   Sig: Take 2 tablets (200 mg total) by mouth daily   sertraline (ZOLOFT) 100 mg tablet   No No   Sig: Take 2 tablets (200 mg total) by mouth daily      Facility-Administered Medications: None       Allergies   Allergen Reactions   • Cobalt Other (See Comments)     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.         PHYSICAL EXAM    PE limited by: None    Objective   Vitals:   First set: Temperature: 98.2 °F (36.8 °C) (07/12/23 2342)  Pulse: 79 (07/12/23 2342)  Respirations: 18 (07/12/23 2342)  Blood Pressure: 161/73 (07/12/23 2342)  SpO2: 94 % (07/12/23 2342)    Primary Survey:   (A) Airway: Patent  (B) Breathing: bilateral breath sounds  (C) Circulation: Pulses:   pedal  2/4 and radial  2/4  (D) Disabliity:  GCS Total:  15  (E) Expose:  Completed    Secondary Survey: (Click on Physical Exam tab above)  Physical Exam  Vitals and nursing note reviewed. Constitutional:       General: She is in acute distress. Appearance: She is well-developed and normal weight. She is ill-appearing. HENT:      Head: Normocephalic and atraumatic. Nose: Nose normal.      Mouth/Throat:      Mouth: Mucous membranes are moist.      Pharynx: Oropharynx is clear. Eyes:      Extraocular Movements: Extraocular movements intact. Conjunctiva/sclera: Conjunctivae normal.      Pupils: Pupils are equal, round, and reactive to light. Neck:      Comments: Patient placed in c-collar. Midline cervical tenderness. Cardiovascular:      Rate and Rhythm: Normal rate and regular rhythm. Pulses: Normal pulses. Heart sounds: Normal heart sounds. No murmur heard. No friction rub. Pulmonary:      Effort: Pulmonary effort is normal. No respiratory distress. Breath sounds: Normal breath sounds. No stridor. No wheezing, rhonchi or rales. Abdominal:      General: Abdomen is flat. Bowel sounds are normal. There is no distension. Palpations: Abdomen is soft. Tenderness: There is abdominal tenderness. There is no right CVA tenderness, left CVA tenderness, guarding or rebound. Comments: Tenderness to palpation in the epigastric and periumbilical region. Musculoskeletal:         General: Tenderness and deformity present. No swelling or signs of injury. Cervical back: Normal range of motion and neck supple. Tenderness present. No rigidity. Right lower leg: No edema. Left lower leg: No edema. Comments: Right lower extremity is shortened and externally rotated with tenderness to palpation of the right hip. Lymphadenopathy:      Cervical: No cervical adenopathy. Skin:     General: Skin is warm and dry. Capillary Refill: Capillary refill takes less than 2 seconds. Coloration: Skin is not jaundiced. Findings: No bruising, erythema or rash. Neurological:      General: No focal deficit present. Mental Status: She is alert and oriented to person, place, and time. Mental status is at baseline. Cranial Nerves: No cranial nerve deficit. Sensory: No sensory deficit. Motor: No weakness. Psychiatric:         Mood and Affect: Mood normal.         Cervical spine cleared by clinical criteria?  No (imaging required)      Invasive Devices     Peripheral Intravenous Line  Duration           Peripheral IV 07/12/23 Left Antecubital <1 day                Lab Results:   Results Reviewed     Procedure Component Value Units Date/Time    Basic metabolic panel [[id number]]  (Abnormal) Collected: 07/13/23 0050    Lab Status: Final result Specimen: Blood from Arm, Left Updated: 07/13/23 0118     Sodium 143 mmol/L      Potassium 3.8 mmol/L      Chloride 102 mmol/L      CO2 34 mmol/L      ANION GAP 7 mmol/L      BUN 41 mg/dL      Creatinine 0.79 mg/dL      Glucose 94 mg/dL      Calcium 9.3 mg/dL      eGFR 65 ml/min/1.73sq m     Narrative:      [facility] Kid[facility] Disease (CKD):   •  Stage 1 with normal or high GFR (GFR > 90 mL/min/1.73 square meters)  •  Stage 2 Mild CKD (GFR = 60-89 mL/min/1.73 square meters)  •  Stage 3A Moderate CKD (GFR = 45-59 mL/min/1.73 square meters)  •  Stage 3B Moderate CKD (GFR = 30-44 mL/min/1.73 square meters)  •  Stage 4 Severe CKD (GFR = 15-29 mL/min/1.73 square meters)  •  Stage 5 End Stage CKD (GFR <15 mL/min/1.73 square meters)  Note: GFR calculation is accurate only with a steady state creatinine    Hepatic function panel [[id number]]  (Normal) Collected: 07/13/23 0050    Lab Status: Final result Specimen: Blood from Arm, Left Updated: 07/13/23 0118     Total Bilirubin 0.31 mg/dL      Bilirubin, Direct 0.05 mg/dL      Alkaline Phosphatase 46 U/L      AST 16 U/L      ALT 12 U/L      Total Protein 6.5 g/dL      Albumin 4.1 g/dL     CBC and differential [[id number]] Collected: 07/13/23 0050    Lab Status: Final result Specimen: Blood from Arm, Left Updated: 07/13/23 0102     WBC 5.22 Thousand/uL      RBC 4.27 Million/uL      Hemoglobin 13.0 g/dL      Hematocrit 41.1 %      MCV 96 fL      MCH 30.4 pg      MCHC 31.6 g/dL      RDW 14.3 %      MPV 9.6 fL      Platelets 661 Thousands/uL      nRBC 0 /100 WBCs      Neutrophils Relative 54 %      Immat GRANS % 1 %      Lymphocytes Relative 33 %      Monocytes Relative 9 %      Eosinophils Relative 2 %      Basophils Relative 1 %      Neutrophils Absolute 2.88 Thousands/µL      Immature Grans Absolute 0.03 Thousand/uL      Lymphocytes Absolute 1.70 Thousands/µL      Monocytes Absolute 0.47 Thousand/µL      Eosinophils Absolute 0.11 Thousand/µL      Basophils Absolute 0.03 Thousands/µL                  Imaging Studies:   Direct to CT: No  XR Trauma pelvis ap only 1 or 2 vw   Final Result by Teri Scheuermann, DO (07/13 1102)      No acute osseous abnormality is seen. Other findings as above. Workstation performed: [id number]         XR chest 1 view portable   ED Interpretation by Arianna Ruffin DO (07/13 0241)   Concerning for left upper lobe mass. XR femur 2 views RIGHT   Final Result by Teri Scheuermann, DO (07/13 4197)      No acute osseous abnormality is seen. Other findings as above. Workstation performed: [id number]         TRAUMA - CT head wo contrast   Final Result by Sunny Ferreira MD (07/13 0118)      No acute intracranial abnormality. Chronic appearing findings as above. The study was marked in [facility] for immediate notification. Workstation performed: [id number]         TRAUMA - CT spine cervical wo contrast   Final Result by Sunny Ferreira MD (07/13 0161)      No acute cervical spine fracture or traumatic malalignment. Multilevel degenerative changes are present. The study was marked in [facility] for immediate notification.             Workstation performed: [id number]         CT chest abdomen pelvis wo contrast Final Result by Amsih Patterson MD (07/13 0211)      There is an approximately 7 x 3 x 4.3 cm peripherally spiculated mass within the left lower lobe of the lung, as described above, the appearance of which is very concerning for malignant neoplasm. There are also several small pulmonary nodules    bilaterally; metastases not excluded. Pulmonology consultation and follow-up is recommended. A 5 cm soft tissue density structure in the left adnexal region could be related to the left ovary versus a partially exophytic fibroid. Nonemergent outpatient pelvic ultrasound recommended for further evaluation. Possible asymmetric wall thickening along the posterior aspect of the urinary bladder. Nonemergent bladder ultrasound recommended for further evaluation. Correlation with urinalysis also recommended. There is a 7 mm hyperdense lesion arising from the anterior aspect upper pole left kidney. Nonemergent outpatient ultrasound recommended for further characterization. No evidence of acute intrathoracic, intra-abdominal or intrapelvic parenchymal organ injury. No pneumothorax. There is advanced degenerative change of the right hip. Both shoulders are high riding suggesting chronic rotator cuff pathology. There is a somewhat loculated appearing fluid collection about the left shoulder suggesting large left shoulder joint effusion. There is an infrarenal abdominal aortic aneurysm measuring up to 3.1 cm diameter. Extensive atherosclerosis. Coronary artery disease. Other nonemergent and chronic findings as above. This examination demonstrates findings for which clinical and imaging follow-up is recommended and was logged as such in EPIC.          I personally discussed this study with Jena Arthur on 7/13/2023 2:01 AM.                  Workstation performed: [id number]               Procedures  ECG 12 Lead Documentation Only    Date/Time: 7/13/2023 1:18 AM    Performed by: Divina Shane Deisi Medina DO  Authorized by: Kate Vaughn DO    Indications / Diagnosis:  Fall   ECG reviewed by me, the ED Provider: yes    Patient location:  ED  Previous ECG:     Previous ECG:  Compared to current    Similarity:  Changes noted    Comparison to cardiac monitor: No    Interpretation:     Interpretation: abnormal    Quality:     Tracing quality:  Limited by artifact  Rate:     ECG rate:  81    ECG rate assessment: normal    Rhythm:     Rhythm: sinus rhythm    Ectopy:     Ectopy: none    QRS:     QRS axis:  Normal    QRS intervals: Wide  Conduction:     Conduction: abnormal      Abnormal conduction: incomplete RBBB    ST segments:     ST segments:  Abnormal    Elevation:  V3  T waves:     T waves: normal    Other findings:     Other findings: LVH    Comments:      Normal sinus rhythm with new incomplete right bundle branch block and left ventricular hypertrophy. Mild ST elevation in leads V3. Patient denies chest pain or shortness of breath at this time. ED Course  ED Course as of 07/13/23 0321   Thu Jul 13, 2023   0134 TRAUMA - CT head wo contrast  IMPRESSION:     No acute intracranial abnormality. Chronic appearing findings as above. 0134 CBC and differential  Within normal limits   1867 Basic metabolic panel(!)  Hypercapnic with elevated BUN.   0135 Hepatic function panel  Within normal limits   0239 TRAUMA - CT spine cervical wo contrast  IMPRESSION:     No acute cervical spine fracture or traumatic malalignment. Multilevel degenerative changes are present. 0713 CT chest abdomen pelvis wo contrast  IMPRESSION:     There is an approximately 7 x 3 x 4.3 cm peripherally spiculated mass within the left lower lobe of the lung, as described above, the appearance of which is very concerning for malignant neoplasm. There are also several small pulmonary nodules   bilaterally; metastases not excluded.  Pulmonology consultation and follow-up is recommended.     A 5 cm soft tissue density structure in the left adnexal region could be related to the left ovary versus a partially exophytic fibroid. Nonemergent outpatient pelvic ultrasound recommended for further evaluation.     Possible asymmetric wall thickening along the posterior aspect of the urinary bladder. Nonemergent bladder ultrasound recommended for further evaluation. Correlation with urinalysis also recommended.     There is a 7 mm hyperdense lesion arising from the anterior aspect upper pole left kidney. Nonemergent outpatient ultrasound recommended for further characterization.     No evidence of acute intrathoracic, intra-abdominal or intrapelvic parenchymal organ injury. No pneumothorax.     There is advanced degenerative change of the right hip.     Both shoulders are high riding suggesting chronic rotator cuff pathology. There is a somewhat loculated appearing fluid collection about the left shoulder suggesting large left shoulder joint effusion.     There is an infrarenal abdominal aortic aneurysm measuring up to 3.1 cm diameter. Extensive atherosclerosis. Coronary artery disease.     Other nonemergent and chronic findings as above. 0240 XR Trauma pelvis ap only 1 or 2 vw  FINDINGS:     No acute fracture or hip dislocation.     Severe degenerative changes of the right hip with joint space narrowing, subchondral sclerosis, and osteophytosis     No suspicious appearing osseous lesions are seen.     There are atherosclerotic calcifications. Soft tissues are otherwise grossly unremarkable.     Degenerative changes visualized lower lumbar spine. 0240 XR femur 2 views RIGHT  IMPRESSION:     No acute osseous abnormality is seen. 0241 XR chest 1 view portable  Concerning for left upper lobe mass. 1592 I spoke with the radiologist who informed me that the patient has several lesions concerning for malignancies, specifically in her lung. I will look to admit the patient at this time.    80 I spoke with Dr. Karolyn Linares who agreed to admission to observation at this time. Medical Decision Making  The patient is a 68-year-old female with a past medical history of hypertension, hyperlipidemia and abdominal aortic aneurysm currently only taking aspirin who presents to the emergency department after a fall from standing position. Upon initial presentation the patient was alert and oriented x4 and in mild distress secondary to right hip pain. Upon physical exam her right lower extremity was externally rotated and shortened in comparison to her left and there was point tenderness at the greater trochanter on the right side. Patient also had periumbilical tenderness to palpation and epigastric tenderness. She had midline cervical tenderness as well as tenderness to the right parietal region of her head. There is no sign of ecchymosis or hematoma. The patient was placed in a c-collar. The differential includes but is not limited to subdural hematoma, epidural hematoma, cervical neck fracture, right femur fracture, pelvic fracture. I have ordered CT scans of her head and neck without contrast as well as a CT scan of her chest abdomen and pelvis without contrast.  Have ordered x-rays of her chest and right hip. Of also ordered a CBC, CMP, type and screen and coags. The patient was given 0.2 mg Dilaudid for pain. Results pending. Amount and/or Complexity of Data Reviewed  Labs: ordered. Decision-making details documented in ED Course. Radiology: ordered and independent interpretation performed. Decision-making details documented in ED Course. Risk  Prescription drug management. Disposition  [facility] Transfer: No  Final diagnoses:    Mass of lower lobe of left lung   Fall, initial encounter   Adnexal mass   AAA (abdominal aortic aneurysm) ([address])     Time reflects when diagnosis was documented in both MDM as applicable and the Disposition within this note     Time User Action Codes Description Comment 7/13/2023  3:17 AM Lorna Stair Add [R91.8] Mass of lower lobe of left lung     7/13/2023  3:17 AM Malik Mane Add [G59. XXXA] Fall, initial encounter     7/13/2023  3:18 AM Lorna Stair Add [N94.89] Adnexal mass     7/13/2023  3:18 AM Lorna Stair Add [I71.40] AAA (abdominal aortic aneurysm) [facility])       ED Disposition     ED Disposition   Admit    Condition   Stable    Date/Time   Thu Jul 13, 2023  3:18 AM    Comment   Case was discussed with Dr. En Morris and the patient's admission status was agreed to be Admission Status: observation status to the service of Dr. En Morris . Follow-up Information    None       Patient's Medications   Discharge Prescriptions    No medications on file     No discharge procedures on file.     PDMP Review       Value Time User    PDMP Reviewed  Yes 7/12/2023 11:50 PM Aneudy Girard MD          ED Provider  Electronically Signed by         Lida Leo DO  07/13/23 0321

## 2023-07-13 NOTE — H&P
SL[facility] ADMISSION H&P     Name: Shahida Solorio   Age & Sex: 80 y.o. female   MRN: [id number]  Unit/Bed#: S -01   Encounter: [id number]  Primary Care Provider: Rasta Snyder DO    Code Status: Level 4 - Comfort Care  Admission Status: INPATIENT   Disposition: Patient requires Med/Surg      ASSESSMENT/PLAN     Principal Problem:    Fall  Active Problems:    Essential hypertension    AAA (abdominal aortic aneurysm) without rupture [facility])    Ambulatory dysfunction    Acquired hypothyroidism    Lung mass      Lung mass  Assessment & Plan  Incidental finding on imaging during trauma work up  7cm spiculated mass. Can discuss w/ pulm   Oxygen sat >92% RA    Acquired hypothyroidism  Assessment & Plan  Continue levothyroxine     Ambulatory dysfunction  Assessment & Plan  PT / OT     AAA (abdominal aortic aneurysm) without rupture (HCC)  Assessment & Plan  3.1 cm diameter. Essential hypertension  Assessment & Plan  Goal <140/90; ideally 130/80 in setting of AAA  Avoid hypotension  Continue amlodipine, benazepril = lisinopril in house    * Fall  Assessment & Plan  Presented w/ a fall w/ head strike and No LOC  Trauma work up negative for any sustained trauma  Imaging revealed a spiculated 7cm lung mass     Pain control   PT/ OT  Further work up for mass      VTE Pharmacologic Prophylaxis: Heparin  VTE Mechanical Prophylaxis: sequential compression device    CHIEF COMPLAINT     Chief Complaint   Patient presents with   • Fall     Patient presents as Trauma C for fall from standing, her knee gave out. HISTORY OF PRESENT ILLNESS     Patient is a 43-year-old female with past medical history of hypertension, hyperlipidemia and AAA who presents to the ED as a trauma for a fall from her knees giving out. She fell onto her right side striking the right side of her head and right hip on a concrete floor. She denied loss of consciousness and states that she takes aspirin daily.   She denies any change in vision, lightheadedness, chest pain, shortness of breath, nausea, vomiting, abdominal pain, diarrhea, numbness, tingling, weakness, hematuria, dysuria, rash or fever. Incidental finding of a 7cm spiculated lung mass during trauma work up requiring further evaluation / management    /76   Pulse 74   Temp 98.1 °F (36.7 °C)   Resp 18   SpO2 93%     Admit to medicine for PT / OT eval and lung mass management. REVIEW OF SYSTEMS     Review of Systems   Constitutional: Negative for unexpected weight change. Respiratory: Negative for shortness of breath and wheezing. Cardiovascular: Negative for chest pain. Gastrointestinal: Negative for nausea and vomiting. Endocrine: Negative for polydipsia and polyuria. Genitourinary: Negative for dysuria. Neurological: Negative for weakness. OBJECTIVE     Vitals:    23 0130 23 0200 23 0300 23 0413   BP: 151/71 156/71 165/72 170/76   Pulse: 76 76 74    Resp: 18 18 18    Temp:    98.1 °F (36.7 °C)   TempSrc:       SpO2: 91% 92% 93%       Temperature:   Temp (24hrs), Av.2 °F (36.8 °C), Min:98.1 °F (36.7 °C), Max:98.2 °F (36.8 °C)    Temperature: 98.1 °F (36.7 °C)  Intake & Output:  I/O     None        Weights: There is no height or weight on file to calculate BMI. Weight (last 2 days)     None        Physical Exam  Constitutional:       General: She is not in acute distress. Appearance: She is ill-appearing. She is not toxic-appearing. HENT:      Head: Normocephalic and atraumatic. Cardiovascular:      Rate and Rhythm: Normal rate and regular rhythm. Pulses: Normal pulses. Heart sounds: Normal heart sounds. No gallop. Pulmonary:      Effort: Pulmonary effort is normal.      Breath sounds: Normal breath sounds. No rales. Abdominal:      General: Bowel sounds are normal. There is no distension. Palpations: There is no mass. Tenderness: There is no abdominal tenderness.  There is no right CVA tenderness or left CVA tenderness. Musculoskeletal:      Right lower leg: No edema. Left lower leg: No edema. Neurological:      General: No focal deficit present. Psychiatric:         Mood and Affect: Mood normal.         Behavior: Behavior normal.       PAST MEDICAL HISTORY     Past Medical History:   Diagnosis Date   • Cyst of soft tissue 12/10/2021   • Disorder of bone    • Edema    • Essential hypertension    • Hyperlipemia    • Hyperlipidemia    • Hypertension    • Left shoulder pain 12/10/2021     PAST SURGICAL HISTORY     Past Surgical History:   Procedure Laterality Date   • FL INJECTION RIGHT HIP (NON ARTHROGRAM)  6/2/2021   • FL INJECTION RIGHT HIP (NON ARTHROGRAM)  5/30/2023     SOCIAL & FAMILY HISTORY     Social History     Substance and Sexual Activity   Alcohol Use Yes   • Alcohol/week: 7.0 standard drinks of alcohol   • Types: 7 Glasses of wine per week     Substance and Sexual Activity   Alcohol Use Yes   • Alcohol/week: 7.0 standard drinks of alcohol   • Types: 7 Glasses of wine per week        Substance and Sexual Activity   Drug Use No     Social History     Tobacco Use   Smoking Status Former   Smokeless Tobacco Never     Family History   Family history unknown: Yes     LABORATORY DATA     Labs: I have personally reviewed pertinent reports. Results from last 7 days   Lab Units 07/13/23  0050   WBC Thousand/uL 5.22   HEMOGLOBIN g/dL 13.0   HEMATOCRIT % 41.1   PLATELETS Thousands/uL 160   NEUTROS PCT % 54   MONOS PCT % 9   EOS PCT % 2      Results from last 7 days   Lab Units 07/13/23  0050   POTASSIUM mmol/L 3.8   CHLORIDE mmol/L 102   CO2 mmol/L 34*   BUN mg/dL 41*   CREATININE mg/dL 0.79   CALCIUM mg/dL 9.3   ALK PHOS U/L 46   ALT U/L 12   AST U/L 16                          Micro:  No results found for: "BLOODCX", "Himanshu David", "WOUNDCULT", "SPUTUMCULTUR"  IMAGING & DIAGNOSTIC TESTS     Imaging: I have personally reviewed pertinent reports.     XR Trauma pelvis ap only 1 or 2 vw    Result Date: 7/13/2023  Impression: No acute osseous abnormality is seen. Other findings as above. Workstation performed: [id number]     XR femur 2 views RIGHT    Result Date: 7/13/2023  Impression: No acute osseous abnormality is seen. Other findings as above. Workstation performed: [id number]     TRAUMA - CT spine cervical wo contrast    Result Date: 7/13/2023  Impression: No acute cervical spine fracture or traumatic malalignment. Multilevel degenerative changes are present. The study was marked in [facility] for immediate notification. Workstation performed: [id number]     CT chest abdomen pelvis wo contrast    Result Date: 7/13/2023  Impression: There is an approximately 7 x 3 x 4.3 cm peripherally spiculated mass within the left lower lobe of the lung, as described above, the appearance of which is very concerning for malignant neoplasm. There are also several small pulmonary nodules bilaterally; metastases not excluded. Pulmonology consultation and follow-up is recommended. A 5 cm soft tissue density structure in the left adnexal region could be related to the left ovary versus a partially exophytic fibroid. Nonemergent outpatient pelvic ultrasound recommended for further evaluation. Possible asymmetric wall thickening along the posterior aspect of the urinary bladder. Nonemergent bladder ultrasound recommended for further evaluation. Correlation with urinalysis also recommended. There is a 7 mm hyperdense lesion arising from the anterior aspect upper pole left kidney. Nonemergent outpatient ultrasound recommended for further characterization. No evidence of acute intrathoracic, intra-abdominal or intrapelvic parenchymal organ injury. No pneumothorax. There is advanced degenerative change of the right hip. Both shoulders are high riding suggesting chronic rotator cuff pathology. There is a somewhat loculated appearing fluid collection about the left shoulder suggesting large left shoulder joint effusion.  There is an infrarenal abdominal aortic aneurysm measuring up to 3.1 cm diameter. Extensive atherosclerosis. Coronary artery disease. Other nonemergent and chronic findings as above. This examination demonstrates findings for which clinical and imaging follow-up is recommended and was logged as such in [facility]. I personally discussed this study with Lexx Zamoraanel on 7/13/2023 2:01 AM. Workstation performed: [id number]     TRAUMA - CT head wo contrast    Result Date: 7/13/2023  Impression: No acute intracranial abnormality. Chronic appearing findings as above. The study was marked in [facility] for immediate notification. Workstation performed: [id number]     EKG, Pathology, and Other Studies: I have personally reviewed pertinent reports. ALLERGIES     Allergies   Allergen Reactions   • Cobalt Other (See Comments)     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. MEDICATIONS PRIOR TO ARRIVAL     Prior to Admission medications    Medication Sig Start Date End Date Taking?  Authorizing Provider   Acetaminophen Extra Strength 500 MG tablet TAKE 2 TABLETS EVERY 12 HOURS 5/17/23   Gillermo Bloch, CRNP   amLODIPine (NORVASC) 5 mg tablet TAKE 1 TABLET IN THE MORNING 8/15/22   Gillermo Bloch, CRNP   aspirin 81 mg chewable tablet Chew 81 mg daily    Historical Provider, MD   Aspirin Low Dose 81 MG EC tablet TAKE 1 TABLET DAILY 4/18/23   Gillermo Bloch, CRNP   atorvastatin (LIPITOR) 40 mg tablet TAKE 1 TABLET DAILY 11/28/22   Gillermo Bloch, CRNP   benazepril (LOTENSIN) 20 mg tablet Take 1 tablet (20 mg total) by mouth daily 5/3/23   MARYSE Underwood   Calcium Carbonate-Vit D-Min (CALCIUM 1200 PO) Take by mouth Take 2 tablets daily    Historical Provider, MD   cholecalciferol (VITAMIN D3) 1,000 units tablet Take 1 tablet (1,000 Units total) by mouth daily 5/3/23   MARYSE Underwood   levothyroxine 25 mcg tablet TAKE ONE AND ONE-HALF TABLETS DAILY 2/24/23   Harshal Sorensen MARYSE Friedman   Lidocaine HCl (Aspercreme Lidocaine) 4 % CREA Apply topically 4 (four) times a day as needed    Historical Provider, MD   Myrbetriq 50 MG TB24 TAKE 1 TABLET DAILY 9/8/22   MARYSE Levine   Polyvinyl Alcohol-Povidone (Artificial Tears) 5-6 MG/ML SOLN Apply to eye every 4 (four) hours as needed    Historical Provider, MD   sertraline (ZOLOFT) 100 mg tablet Take 2 tablets (200 mg total) by mouth daily 7/11/23   Jaki Davila DO   sertraline (ZOLOFT) 100 mg tablet Take 2 tablets (200 mg total) by mouth daily 7/11/23   Rodrigo Ibrahim DO     MEDICATIONS ADMINISTERED IN LAST 24 HOURS     Medication Administration - last 24 hours from 07/12/2023 0425 to 07/13/2023 0425       Date/Time Order Dose Route Action Action by     07/12/2023 5002 EDT HYDROmorphone HCl (DILAUDID) injection 0.2 mg 0.2 mg Intravenous Given Eddie Espinal RN        CURRENT MEDICATIONS            Admission Time  I spent 45 minutes admitting the patient. This involved direct patient contact where I performed a full history and physical, reviewing previous records, and reviewing laboratory and other diagnostic studies. Portions of the record may have been created with voice recognition software. Occasional wrong word or "sound a like" substitutions may have occurred due to the inherent limitations of voice recognition software.   Read the chart carefully and recognize, using context, where substitutions have occurred.    ==  Reilly Moses MD  [id number]

## 2023-07-13 NOTE — HOSPICE NOTE
Met with with pt at bedside. She is approved for home hospice by Dr Jacques Gonzalez. Reviewed hospice care and services per Medicare quidelines. Pt is in agreement with the same. All questions answered. Pt signed her own consents. Asher Hampton PA-C is sending scripts for her Oxycodone with her. Updated pts niece as well. Copies of the consents to pt. OOH DNR to CM for transport.  If pt does not go back to [address] [place] [address]. today please have her home by 12N Please update liaison with  time

## 2023-07-13 NOTE — PLAN OF CARE
Problem: PHYSICAL THERAPY ADULT  Goal: Performs mobility at highest level of function for planned discharge setting. See evaluation for individualized goals. Description: Treatment/Interventions: Functional transfer training, LE strengthening/ROM, Therapeutic exercise, Patient/family training, Equipment eval/education, Cognitive reorientation, Bed mobility, Gait training, Spoke to case management, OT          See flowsheet documentation for full assessment, interventions and recommendations. 7/13/2023 1243 by Warren Bourne PT  Outcome: Progressing  Note: Prognosis: Poor  Problem List: Decreased strength, Decreased endurance, Impaired balance, Decreased mobility, Decreased cognition, Impaired judgement, Decreased safety awareness, Pain  Assessment: Pt is a 80 y.o. female seen for PT evaluation s/p admit to [address] [place] [address] on 7/12/2023. Pt was admitted with a primary dx of: lung mass. PT now consulted for assessment of mobility and d/c needs. Pt with Up and OOB as tolerated orders. Pts current comorbidities and personal factors effecting treatment include: HTN, HLD, osteopenia,AAA . Pts current clinical presentation is Unstable/Unpredictable (high complexity) due to Ongoing medical management for primary dx, Decreased activity tolerance compared to baseline, Fall risk, Increased assistance needed from caregiver at current time, Diagnostic imaging pending. Prior to admission, pt was independent with use of RW. Upon evaluation, pt currently is requiringMaxA x2 for transfers and MaxA x2 for ambulation 3 ft w/ RW. Pt presents at PT eval functioning below baseline and currently w/ overall mobility deficits 2* to: BLE weakness, impaired balance, impaired coordination, gait deviations, pain, decreased activity tolerance compared to baseline, decreased functional mobility tolerance compared to baseline, decreased safety awareness, impaired judgement, fall risk, decreased cognition.  Pt currently at a fall risk 2* to impairments listed above. Pt will continue to benefit from skilled acute PT interventions to address stated impairments; to maximize functional mobility; for ongoing pt/ family training; and DME needs. At conclusion of PT session pt returned back in chair, chair alarm engaged, all needs in reach and RN notified of session findings/recommendations with phone and call bell within reach. Pt denies any further questions at this time. Recommend return to facility with rehab services upon hospital D/C. PT Discharge Recommendation: Return to facility with rehabilitation services    See flowsheet documentation for full assessment. 7/13/2023 1242 by Bruno Marie PT  Note: Prognosis: Poor  Problem List: Decreased strength, Decreased endurance, Impaired balance, Decreased mobility, Decreased cognition, Impaired judgement, Decreased safety awareness, Pain  Assessment: Pt is a 80 y.o. female seen for PT evaluation s/p admit to [facility] on 7/12/2023. Pt was admitted with a primary dx of: lung mass. PT now consulted for assessment of mobility and d/c needs. Pt with Up and OOB as tolerated orders. Pts current comorbidities and personal factors effecting treatment include: HTN, HLD, osteopenia,AAA . Pts current clinical presentation is Unstable/Unpredictable (high complexity) due to Ongoing medical management for primary dx, Decreased activity tolerance compared to baseline, Fall risk, Increased assistance needed from caregiver at current time, Diagnostic imaging pending. Prior to admission, pt was independent with use of RW. Upon evaluation, pt currently is requiringMaxA x2 for transfers and MaxA x2 for ambulation 3 ft w/ RW.  Pt presents at PT eval functioning below baseline and currently w/ overall mobility deficits 2* to: BLE weakness, impaired balance, impaired coordination, gait deviations, pain, decreased activity tolerance compared to baseline, decreased functional mobility tolerance compared to baseline, decreased safety awareness, impaired judgement, fall risk, decreased cognition. Pt currently at a fall risk 2* to impairments listed above. Pt will continue to benefit from skilled acute PT interventions to address stated impairments; to maximize functional mobility; for ongoing pt/ family training; and DME needs. At conclusion of PT session pt returned back in chair, chair alarm engaged, all needs in reach and RN notified of session findings/recommendations with phone and call bell within reach. Pt denies any further questions at this time. Recommend return to facility with rehab services upon hospital D/C. PT Discharge Recommendation: Return to facility with rehabilitation services    See flowsheet documentation for full assessment.

## 2023-07-13 NOTE — PHYSICAL THERAPY NOTE
Physical Therapy Evaluation    Patient's Name: Hanna Childress    Admitting Diagnosis  Hip injury [C11.885S]  AAA (abdominal aortic aneurysm) ([address]) [I71.40]  Adnexal mass [N94.89]  Mass of lower lobe of left lung [R91.8]    Problem List  Patient Active Problem List   Diagnosis    Stress incontinence of urine    Essential hypertension    AAA (abdominal aortic aneurysm) without rupture (HCC)    Depression    Other hyperlipidemia    Right hip pain    Ambulatory dysfunction    Overactive bladder    Acquired hypothyroidism    Osteopenia after menopause    Primary osteoarthritis of one hip, right    Greater trochanteric bursitis of right hip    Primary osteoarthritis of right knee    Skin lesion    Edema of left foot    Cyst of soft tissue    Left shoulder pain    Weakness of left lower extremity    Weakness of both lower extremities    Primary osteoarthritis involving multiple joints    Rotator cuff arthropathy of left shoulder    Weight loss    Goals of care, counseling/discussion    Fall    Lung mass       Past Medical History  Past Medical History:   Diagnosis Date    Cyst of soft tissue 12/10/2021    Disorder of bone     Edema     Essential hypertension     Hyperlipemia     Hyperlipidemia     Hypertension     Left shoulder pain 12/10/2021       Past Surgical History  Past Surgical History:   Procedure Laterality Date    FL INJECTION RIGHT HIP (NON ARTHROGRAM)  6/2/2021    FL INJECTION RIGHT HIP (NON ARTHROGRAM)  5/30/2023 07/13/23 1021   Note Type   Note type Evaluation   Pain Assessment   Pain Assessment Tool FLACC   Pain Rating: FLACC (Rest) - Face 0   Pain Rating: FLACC (Rest) - Legs 0   Pain Rating: FLACC (Rest) - Activity 0   Pain Rating: FLACC (Rest) - Cry 0   Pain Rating: FLACC (Rest) - Consolability 0   Score: FLACC (Rest) 0   Pain Rating: FLACC (Activity) - Face 1   Pain Rating: FLACC (Activity) - Legs 1   Pain Rating: FLACC (Activity) - Activity 0   Pain Rating: FLACC (Activity) - Cry 1   Pain Rating: FLACC (Activity) - Consolability 0   Score: FLACC (Activity) 3   Restrictions/Precautions   Weight Bearing Precautions Per Order No   Other Precautions Cognitive; Chair Alarm; Bed Alarm; Fall Risk;Pain;Multiple lines   Home Living   Type of Home Assisted living   Home Layout One level; Access   Bathroom Shower/Tub Walk-in shower   Home Equipment [address]  (rollator)   Prior Function   Lives With Facility staff   Receives Help From Personal care attendant   Falls in the last 6 months 1 to 4   Vocational Retired   Comments per pt she ambualtes short distances with rollator independently, transfers to and from w/c, unable to self propel in w./c   Cognition   Orientation Level Oriented X4   Memory Decreased recall of recent events;Decreased recall of precautions   Following Commands Follows one step commands with increased time or repetition   Comments pt ID by wristband, name and    RLE Assessment   RLE Assessment WFL   LLE Assessment   LLE Assessment WFL   Bed Mobility   Additional Comments pt seated EOB when PT enters, requires mod x 1 assist to scoot forward to place B/L LE on ground for improved sitting posture   Transfers   Sit to Stand 2  Maximal assistance   Additional items Assist x 2; Increased time required;Verbal cues;Armrests   Stand to Sit 2  Maximal assistance   Additional items Assist x 2; Increased time required;Verbal cues   Stand pivot 2  Maximal assistance   Additional items Assist x 2; Increased time required;Verbal cues   Toilet transfer 2  Maximal assistance   Additional items Assist x 2; Increased time required;Verbal cues; Commode   Additional Comments x3 attempts of STS at EOB, x2 with RW, x 1 HHA, pt required max x 2 assist for all, pt highly anxious with standing, requesting use of RW,pt required chair swap from [facility] to recliner   Ambulation/Elevation   Gait pattern Poor UE support;Decreased foot clearance; Short stride; Excessively slow; Step to   Gait Assistance 2  Maximal assist Additional items Assist x 2   Assistive Device Rolling walker   Distance 3'x1   Ambulation/Elevation Additional Comments verbal cues for sequencing, hand placement   Balance   Static Sitting Fair   Dynamic Sitting Fair -   Static Standing Zero  (Ax2)   Dynamic Standing   (zero x2)   Ambulatory Zero  (ax2)   Activity Tolerance   Activity Tolerance Patient limited by fatigue; Other (Comment)  (anxiety)   Medical Staff Made Aware YINKA Feliciano   Nurse Made Aware RN akash present, assists with pericare and chair swap   Assessment   Prognosis Poor   Problem List Decreased strength;Decreased endurance; Impaired balance;Decreased mobility; Decreased cognition; Impaired judgement;Decreased safety awareness;Pain   Assessment Pt is a 80 y.o. female seen for PT evaluation s/p admit to [address] [place] [address] on 7/12/2023. Pt was admitted with a primary dx of: lung mass. PT now consulted for assessment of mobility and d/c needs. Pt with Up and OOB as tolerated orders. Pts current comorbidities and personal factors effecting treatment include: HTN, HLD, osteopenia,AAA . Pts current clinical presentation is Unstable/Unpredictable (high complexity) due to Ongoing medical management for primary dx, Decreased activity tolerance compared to baseline, Fall risk, Increased assistance needed from caregiver at current time, Diagnostic imaging pending. Prior to admission, pt was independent with use of RW. Upon evaluation, pt currently is requiringMaxA x2 for transfers and MaxA x2 for ambulation 3 ft w/ RW. Pt presents at PT eval functioning below baseline and currently w/ overall mobility deficits 2* to: BLE weakness, impaired balance, impaired coordination, gait deviations, pain, decreased activity tolerance compared to baseline, decreased functional mobility tolerance compared to baseline, decreased safety awareness, impaired judgement, fall risk, decreased cognition. Pt currently at a fall risk 2* to impairments listed above.   Pt will continue to benefit from skilled acute PT interventions to address stated impairments; to maximize functional mobility; for ongoing pt/ family training; and DME needs. At conclusion of PT session pt returned back in chair, chair alarm engaged, all needs in reach and RN notified of session findings/recommendations with phone and call bell within reach. Pt denies any further questions at this time. Recommend return to facility with rehab services upon hospital D/C. Goals   Patient Goals to go to the bathroom   STG Expiration Date 07/23/23   Short Term Goal #1 In 10 days pt will be able to: 1. Demonstrate ability to perform all aspects of bed mobility with [place] to improve functional safety. 2. Perform functional transfers with [place] to facilitate safe return to previous living environment. 3.  Ambulate 50 ft with RW and supervision with stable vitals to improve safety with household distances and reduce fall risk. 4. Improve LE strength grades by 1 to increase ease of functional mobility with transfers and gait. 5. Pt will demonstrate improved balance by one grade in order to decrease risk of falls. PT Treatment Day 0   Plan   Treatment/Interventions Functional transfer training;LE strengthening/ROM; Therapeutic exercise;Patient/family training;Equipment eval/education;Cognitive reorientation; Bed mobility;Gait training;Spoke to case management;OT   PT Frequency 2-3x/wk   Recommendation   PT Discharge Recommendation Return to facility with rehabilitation services   AM-PAC Basic Mobility Inpatient   Turning in Flat Bed Without Bedrails 2   Lying on Back to Sitting on Edge of Flat Bed Without Bedrails 2   Moving Bed to Chair 1   Standing Up From Chair Using Arms 1   Walk in Room 1   Climb 3-5 Stairs With Railing 1   Basic Mobility Inpatient Raw Score 8   Turning Head Towards Sound 3   Follow Simple Instructions 2   Low Function Basic Mobility Raw Score  13   Low Function Basic Mobility Standardized Score  20.14 Highest Level Of Mobility   -HLM Goal 3: Sit at edge of bed   JH-HLM Achieved 4: Move to chair/commode   End of Consult   Patient Position at End of Consult Bedside chair;Bed/Chair alarm activated; All needs within reach   The patient's AM-PAC Basic Mobility Inpatient Short Form Raw Score is 8. A Raw score of less than or equal to 16 suggests the patient may benefit from discharge to post-acute rehabilitation services. Please also refer to the recommendation of the Physical Therapist for safe discharge planning.     Ralf Song, PT

## 2023-07-13 NOTE — ACP (ADVANCE CARE PLANNING)
Advanced Care Planning Progress Note    Serious Illness Conversation    1. What is your understanding now of where you are with your illness? Prognostic Understanding: appropriate understanding of prognosis  Patient understands that she likely has lung cancer. She understands that this eventually will be terminal and she does not want to pursue any aggressive treatment      2. How much information about what is likely to be ahead with your illness would you like to have? Information: patient wants to be informed of big picture, but not details  She wants herself as well as her POA, Anitha Adelaide informed. 3. What did you (clinician) communicate to the patient? Prognostic Communication: Function - I hope that this is not the case, but I’m worried that this may be as strong as you will feel, and things are likely to get more difficult. 4. If your health situation worsens, what are your most important goals? Goals: be physically comfortable, have my medical decisions respected  She does not want any aggressive treatment for this. She wants to return back to [facility] and live the rest of her days comfortably     5. What are the biggest fears and worries about the future and your health? Fears/Worries: pain, other symptoms     6. What abilities are so critical to your life that you cannot imagine living without them? Unacceptable Function: being in pain or very uncomfortable     7. What gives you strength as you think about the future with your illness? She is happy with her current living situation. 8. If you become sicker, how much are you willing to go through for the possibility of gaining more time? Be in the hospital: Yes Have a feeding tube: No   Be in the ICU: No Live in a nursing home: Yes   Be on a ventilator: No Be uncomfortable: No   Be on dialysis: No Undergo aggressive test and/or procedures: No   She does not want any aggressive measures.   She is okay with the current hospitalization but wants to return home on hospice. She is presently undecided whether she would want to return for treatment of curable and temporary medical issues such as acute infections or dehydration. She will discuss this further with her POA. 9. How much does your proxy and family know about your priorities and wishes? Discussion Discussion: extensive discussion with family about goals and wishes     Conrad Saucedo heard you say that being comfortable is really important to you. Keeping that in mind, and what we know about your illness, I recommend that we cancel all aggressive medical work-ups and involve the hospice liaison to get you back home. This will help us make sure that your treatment plans reflect what’s important to you. How does this plan sound to you? I will do everything I can to help you through this.   Patient verbalized understanding of the plan     I have spent 25 minutes speaking with my patient on advanced care planning today or during this visit     Advanced directives  Five Wishes: Patient does not have Five Wishes- would not like information         River Meehan PA-C

## 2023-07-13 NOTE — ASSESSMENT & PLAN NOTE
Goal <140/90; ideally 130/80 in setting of AAA  Avoid hypotension  Continue amlodipine, benazepril = lisinopril in house

## 2023-07-13 NOTE — ASSESSMENT & PLAN NOTE
Incidental finding on imaging during trauma work up  7cm spiculated mass. Patient does not want aggressive treatment or management at this point.     She is transitioning to hospice at [facility]

## 2023-07-14 ENCOUNTER — HOME CARE VISIT (OUTPATIENT)
Dept: HOME HEALTH SERVICES | Facility: HOME HEALTHCARE | Age: 88
End: 2023-07-14

## 2023-07-14 VITALS
DIASTOLIC BLOOD PRESSURE: 61 MMHG | RESPIRATION RATE: 18 BRPM | HEART RATE: 74 BPM | SYSTOLIC BLOOD PRESSURE: 112 MMHG | OXYGEN SATURATION: 93 % | TEMPERATURE: 98.1 F

## 2023-07-14 LAB — MRSA NOSE QL CULT: NORMAL

## 2023-07-14 PROCEDURE — G0299 HHS/HOSPICE OF RN EA 15 MIN: HCPCS

## 2023-07-14 PROCEDURE — 99239 HOSP IP/OBS DSCHRG MGMT >30: CPT | Performed by: PHYSICIAN ASSISTANT

## 2023-07-14 RX ADMIN — SERTRALINE 200 MG: 100 TABLET, FILM COATED ORAL at 09:16

## 2023-07-14 RX ADMIN — LEVOTHYROXINE SODIUM 37.5 MCG: 75 TABLET ORAL at 06:07

## 2023-07-14 RX ADMIN — ASPIRIN 81 MG 81 MG: 81 TABLET ORAL at 09:17

## 2023-07-14 RX ADMIN — LISINOPRIL 20 MG: 20 TABLET ORAL at 09:19

## 2023-07-14 RX ADMIN — OXYBUTYNIN CHLORIDE 5 MG: 5 TABLET, EXTENDED RELEASE ORAL at 09:16

## 2023-07-14 RX ADMIN — ATORVASTATIN CALCIUM 40 MG: 40 TABLET, FILM COATED ORAL at 09:16

## 2023-07-14 RX ADMIN — Medication 2 TABLET: at 09:16

## 2023-07-14 RX ADMIN — AMLODIPINE BESYLATE 5 MG: 5 TABLET ORAL at 09:19

## 2023-07-14 RX ADMIN — Medication 1000 UNITS: at 09:17

## 2023-07-14 NOTE — CASE MANAGEMENT
Case Management Discharge Planning Note    Patient name Josias Zavaleta  Location S /S -77 MRN [id number]  : 1931 Date 2023       Current Admission Date: 2023  Current Admission Diagnosis:Lung mass   Patient Active Problem List    Diagnosis Date Noted   • Fall 2023   • Lung mass 2023   • Weight loss 2023   • Goals of care, counseling/discussion 2023   • Rotator cuff arthropathy of left shoulder 2023   • Weakness of both lower extremities 2022   • Primary osteoarthritis involving multiple joints 2022   • Weakness of left lower extremity 2022   • Cyst of soft tissue 12/10/2021   • Left shoulder pain 12/10/2021   • Edema of left foot 2021   • Skin lesion 2021   • Primary osteoarthritis of one hip, right 2020   • Greater trochanteric bursitis of right hip 2020   • Primary osteoarthritis of right knee 2020   • Osteopenia after menopause 01/15/2019   • Overactive bladder 2018   • Ambulatory dysfunction 2018   • Right hip pain 2018   • Other hyperlipidemia 2018   • AAA (abdominal aortic aneurysm) without rupture ([address]) 2018   • Depression 2018   • Essential hypertension 2018   • Stress incontinence of urine 2018   • Acquired hypothyroidism 2017      LOS (days): 1  Geometric Mean LOS (GMLOS) (days): 2.50  Days to GMLOS:1.7     OBJECTIVE:  Risk of Unplanned Readmission Score: 9.48         Current admission status: Inpatient   Preferred Pharmacy:   [address] [place] [address], [address] [place] [address] Road  [address] [address]  Phone: [phone] Fax: [phone] for NAWAF  Thang Sauer, [address] [place] [address],[address] [place] [address]  Phone: [phone] Fax: [phone] [address], [address] [facility] [address]  [address] [facility] [address] [place] [address] Drive [address]  Phone: [phone] Fax: [phone]    Primary Care Provider: Dang Sierra DO    Primary Insurance: MEDICARE  Secondary Insurance: [facility]    DISCHARGE DETAILS:                                          Other Referral/Resources/Interventions Provided:  Referral Comments: Due to her functional status at this time, patient will need to go to S skilled unit with hospice - patient and family informed.     Would you like to participate in our [address] [facility] [facility] [address] service program?  : No - Declined    Treatment Team Recommendation: SNF, Hospice  Discharge Destination Plan[de-identified] Hospice, SNF  Transport at Discharge : BLS Ambulance     Number/Name of Dispatcher: [employer]  Transported by [employer] and Unit #): [employer] of Transport (Date): 07/14/23  ETA of Transport (Time): 1030     Transfer Mode: Stretcher  Accompanied by: EMS personnel     IMM Given (Date):: 07/14/23  IMM Given to[de-identified] Patient

## 2023-07-14 NOTE — PLAN OF CARE
Problem: MOBILITY - ADULT  Goal: Maintain or return to baseline ADL function  Description: INTERVENTIONS:  -  Assess patient's ability to carry out ADLs; assess patient's baseline for ADL function and identify physical deficits which impact ability to perform ADLs (bathing, care of mouth/teeth, toileting, grooming, dressing, etc.)  - Assess/evaluate cause of self-care deficits   - Assess range of motion  - Assess patient's mobility; develop plan if impaired  - Assess patient's need for assistive devices and provide as appropriate  - Encourage maximum independence but intervene and supervise when necessary  - Involve family in performance of ADLs  - Assess for home care needs following discharge   - Consider OT consult to assist with ADL evaluation and planning for discharge  - Provide patient education as appropriate  Outcome: Adequate for Discharge  Goal: Maintains/Returns to pre admission functional level  Description: INTERVENTIONS:  - Perform BMAT or MOVE assessment daily.   - Set and communicate daily mobility goal to care team and patient/family/caregiver. - Collaborate with rehabilitation services on mobility goals if consulted  - Perform Range of Motion 4 times a day. - Reposition patient every 2 hours.   - Dangle patient 2 times a day  - Stand patient 2 times a day  - Ambulate patient 2 times a day  - Out of bed to chair 2 times a day   - Out of bed for meals 2 times a day  - Out of bed for toileting  - Record patient progress and toleration of activity level   Outcome: Adequate for Discharge     Problem: PAIN - ADULT  Goal: Verbalizes/displays adequate comfort level or baseline comfort level  Description: Interventions:  - Encourage patient to monitor pain and request assistance  - Assess pain using appropriate pain scale  - Administer analgesics based on type and severity of pain and evaluate response  - Implement non-pharmacological measures as appropriate and evaluate response  - Consider cultural and social influences on pain and pain management  - Notify physician/advanced practitioner if interventions unsuccessful or patient reports new pain  Outcome: Adequate for Discharge     Problem: INFECTION - ADULT  Goal: Absence or prevention of progression during hospitalization  Description: INTERVENTIONS:  - Assess and monitor for signs and symptoms of infection  - Monitor lab/diagnostic results  - Monitor all insertion sites, i.e. indwelling lines, tubes, and drains  - Monitor endotracheal if appropriate and nasal secretions for changes in amount and color  - [place] appropriate cooling/warming therapies per order  - Administer medications as ordered  - Instruct and encourage patient and family to use good hand hygiene technique  - Identify and instruct in appropriate isolation precautions for identified infection/condition  Outcome: Adequate for Discharge  Goal: Absence of fever/infection during neutropenic period  Description: INTERVENTIONS:  - Monitor WBC    Outcome: Adequate for Discharge     Problem: SAFETY ADULT  Goal: Patient will remain free of falls  Description: INTERVENTIONS:  - Educate patient/family on patient safety including physical limitations  - Instruct patient to call for assistance with activity   - Consult OT/PT to assist with strengthening/mobility   - Keep Call bell within reach  - Keep bed low and locked with side rails adjusted as appropriate  - Keep care items and personal belongings within reach  - Initiate and maintain comfort rounds  - Make Fall Risk Sign visible to staff  - Offer Toileting every 2 Hours, in advance of need  - Initiate/Maintain bed/chair alarm  - Obtain necessary fall risk management equipment  - Apply yellow socks and bracelet for high fall risk patients  - Consider moving patient to room near nurses station  Outcome: Adequate for Discharge     Problem: DISCHARGE PLANNING  Goal: Discharge to home or other facility with appropriate resources  Description: INTERVENTIONS:  - Identify barriers to discharge w/patient and caregiver  - Arrange for needed discharge resources and transportation as appropriate  - Identify discharge learning needs (meds, wound care, etc.)  - Arrange for interpretive services to assist at discharge as needed  - Refer to Case Management Department for coordinating discharge planning if the patient needs post-hospital services based on physician/advanced practitioner order or complex needs related to functional status, cognitive ability, or social support system  Outcome: Adequate for Discharge     Problem: Knowledge Deficit  Goal: Patient/family/caregiver demonstrates understanding of disease process, treatment plan, medications, and discharge instructions  Description: Complete learning assessment and assess knowledge base.   Interventions:  - Provide teaching at level of understanding  - Provide teaching via preferred learning methods  Outcome: Adequate for Discharge     Problem: Prexisting or High Potential for Compromised Skin Integrity  Goal: Skin integrity is maintained or improved  Description: INTERVENTIONS:  - Identify patients at risk for skin breakdown  - Assess and monitor skin integrity  - Assess and monitor nutrition and hydration status  - Monitor labs   - Assess for incontinence   - Turn and reposition patient  - Assist with mobility/ambulation  - Relieve pressure over bony prominences  - Avoid friction and shearing  - Provide appropriate hygiene as needed including keeping skin clean and dry  - Evaluate need for skin moisturizer/barrier cream  - Collaborate with interdisciplinary team   - Patient/family teaching  - Consider wound care consult   Outcome: Adequate for Discharge

## 2023-07-16 LAB
ATRIAL RATE: 81 BPM
P AXIS: 85 DEGREES
PR INTERVAL: 152 MS
QRS AXIS: 9 DEGREES
QRSD INTERVAL: 108 MS
QT INTERVAL: 372 MS
QTC INTERVAL: 432 MS
T WAVE AXIS: 90 DEGREES
VENTRICULAR RATE: 81 BPM

## 2023-07-16 PROCEDURE — 93010 ELECTROCARDIOGRAM REPORT: CPT | Performed by: INTERNAL MEDICINE

## 2023-07-17 ENCOUNTER — TELEPHONE (OUTPATIENT)
Age: 88
End: 2023-07-17

## 2023-07-17 NOTE — TELEPHONE ENCOUNTER
[facility] called asking about the Zoloft script that was put in on 7/11. They are wondering if the script still needed to be filled for 90 days, or if the Zoloft will now be filled by [facility]. [phone], Reference number [id number]. Thank you!

## 2023-07-18 ENCOUNTER — NURSING HOME VISIT (OUTPATIENT)
Dept: GERIATRICS | Facility: OTHER | Age: 88
End: 2023-07-18
Payer: MEDICARE

## 2023-07-18 DIAGNOSIS — I10 ESSENTIAL HYPERTENSION: ICD-10-CM

## 2023-07-18 DIAGNOSIS — R53.81 DEBILITY: ICD-10-CM

## 2023-07-18 DIAGNOSIS — M15.9 OSTEOARTHRITIS OF MULTIPLE JOINTS, UNSPECIFIED OSTEOARTHRITIS TYPE: ICD-10-CM
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
## 2023-07-18 DIAGNOSIS — E03.9 ACQUIRED HYPOTHYROIDISM: ICD-10-CM

## 2023-07-18 DIAGNOSIS — N32.81 OVERACTIVE BLADDER: ICD-10-CM

## 2023-07-18 DIAGNOSIS — R91.8 LUNG MASS: Primary | ICD-10-CM

## 2023-07-18 PROCEDURE — 99306 1ST NF CARE HIGH MDM 50: CPT | Performed by: FAMILY MEDICINE

## 2023-07-18 NOTE — PROGRESS NOTES
[address] [facility]  History and Physical  POS: Nursing Facility/LTC-32    Records Reviewed include: [facility] records    Chief Complaint/ Reason for Admission: Fall; lung mass    History of Present Illness:            80year old female admitted to SNF for LTC following hospitalization at [facility]. Presented following a fall with reported head strike. Multiple imaging studies obtained; no acute injuries/ fractures noted. CT chest incidentally revealing a spiculated lung mass suspicious for malignancy; patient declined further workup of intervention for this mass and elected comfort focused measures instead. Personal care resident requiring increased assistance for ADLs and mobility. Would like to get stronger physically in hopes on returning to [facility] apartment prior to initiation of hospice care. See A&P below for additional HPI. Allergies   Allergen Reactions   • Cobalt Other (See Comments)     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. Past Medical History  Past Medical History:   Diagnosis Date   • Cyst of soft tissue 12/10/2021   • Disorder of bone    • Edema    • Essential hypertension    • Hyperlipemia    • Hyperlipidemia    • Hypertension    • Left shoulder pain 12/10/2021        Past Surgical History:   Procedure Laterality Date   • FL INJECTION RIGHT HIP (NON ARTHROGRAM)  6/2/2021   • FL INJECTION RIGHT HIP (NON ARTHROGRAM)  5/30/2023        Family History   Family history unknown: Yes        Social History  Tobacco Use: Medium Risk (8/10/2023)    Patient History    • Smoking Tobacco Use: Former    • Smokeless Tobacco Use: Never    • Passive Exposure: Not on file           Physical Exam    Vitals reviewed in SNF EMR    Physical Exam  Vitals and nursing note reviewed. Constitutional:       General: She is awake. She is not in acute distress.      Appearance: She is not ill-appearing, toxic-appearing or diaphoretic. HENT:      Head: Normocephalic. Nose: No rhinorrhea. Mouth/Throat:      Mouth: Mucous membranes are moist.   Eyes:      General: No scleral icterus. Right eye: No discharge. Left eye: No discharge. Conjunctiva/sclera: Conjunctivae normal.   Pulmonary:      Effort: Pulmonary effort is normal. No respiratory distress. Musculoskeletal:      Cervical back: No rigidity. Right lower leg: No edema. Left lower leg: No edema. Skin:     Coloration: Skin is not jaundiced or pale. Neurological:      Mental Status: She is alert. Mental status is at baseline. Psychiatric:         Behavior: Behavior is cooperative. Review of Systems:  Review of Systems   Constitutional: Negative for fever. Respiratory: Negative for cough and shortness of breath. Musculoskeletal: Positive for arthralgias.         List of Current Medications: Medication list reviewed and updated in Epic to reflect most current [facility] orders    Labs/Diagnostics (reviewed by this provider): Hospital Paperwork  Lab Results   Component Value Date    WBC 5.22 07/13/2023    HGB 13.0 07/13/2023    HCT 41.1 07/13/2023    MCV 96 07/13/2023     07/13/2023     Lab Results   Component Value Date     10/08/2015    SODIUM 143 07/13/2023    K 3.8 07/13/2023     07/13/2023    CO2 34 (H) 07/13/2023    ANIONGAP 10 10/08/2015    AGAP 7 07/13/2023    BUN 41 (H) 07/13/2023    CREATININE 0.79 07/13/2023    GLUC 94 07/13/2023    CALCIUM 9.3 07/13/2023    AST 16 07/13/2023    ALT 12 07/13/2023    ALKPHOS 46 07/13/2023    PROT 6.4 10/08/2015    TP 6.5 07/13/2023    BILITOT 0.60 10/08/2015    TBILI 0.31 07/13/2023    EGFR 65 07/13/2023       Imaging Reviewed:  Xray pelvis/R femur (7/13/23)   CT C/A/P (7/13/23): 7 x 3 x 4.3cm LLL spiculated lung mass  CT head (7/13/23) chronic lacunar infarct and meningioma  EKG (7/13/23)    Assessment/Plan:  80year old female with:    Lung mass       Osteoarthritis of multiple joints  With advanced degenerative changes noted per imaging of the cervical spine, degenerative changes on lumbar spine and hips  Continue scheduled acetaminophen; max 3g/24h; topical lidocaine cream    Fall  Mechanical fall without acute bony traumatic injuries per imaging review    Essential hypertension  Goal <140/90; ideally 130/80 in setting of AAA  Avoid hypotension- SBP trending 100s-140s since SNF admission  Continue amlodipine, benazepril; plan to d/c amlodipine if SBP consistently <110  BMP ordered    Acquired hypothyroidism  Continue levothyroxine at current dose  Plan to check TSH, free T4 with next routine labs- can defer if transitions to hospice care    Overactive bladder  Continue myrbetriq; consider dose reduction and d/c if incontinence remains despite use of medication  Recommend scheduled toileting every 2-3 hours while awake    Debility  Multifactorial  Admit to SNF for LTC  PT/OT consults placed- evaluate and treat  Supportive care, nutritional support, ADL support  Fall precautions  Management of acute and chronic medical conditions as outlined    Advanced Directives: POLST completed following SNF admission  Code status: Updated to DNR/DNI/DNH    MAIDA Brizuela DO  7/18/23

## 2023-07-19 ENCOUNTER — NURSING HOME VISIT (OUTPATIENT)
Dept: GERIATRICS | Facility: OTHER | Age: 88
End: 2023-07-19
Payer: MEDICARE

## 2023-07-19 DIAGNOSIS — R91.8 LUNG MASS: Primary | ICD-10-CM

## 2023-07-19 DIAGNOSIS — Z71.89 GOALS OF CARE, COUNSELING/DISCUSSION: ICD-10-CM

## 2023-07-19 PROCEDURE — 99309 SBSQ NF CARE MODERATE MDM 30: CPT | Performed by: NURSE PRACTITIONER

## 2023-07-19 NOTE — PROGRESS NOTES
[facility] at [facility]  [address] [facility] [place], [address] [place] [address]  [phone]    Acute Visit Note  LTC: POS 32    ASSESSMENT AND PLAN:  1. Lung mass  Assessment & Plan:  · Incidental finding on imaging during trauma work-up  · 7 cm spiculated mass  · CT of chest reviewed with patient   · No aggressive treatment work-up desired  · Patient is currently undecided whether to pursue hospice services. · Continue supportive palliative oriented care. 2. Goals of care, counseling/discussion  Assessment & Plan:  · Goals of care reviewed  · Patient does not wish to have any aggressive treatment or further work-up for lung mass. · Hospice services discussed. Patient is undecided at this time. Will continue supportive palliative oriented care. Name: Allan Boyle     :   1931             Sex: Female       HPI:    Allan Boyle is a 80year old female patient seen and examined per her request to review imaging results. She is status post hospitalization on 2023 due to a fall on the personal care unit at [facility]. Trauma imaging reveals a 7 cm spiculated lung mass. This was a new diagnosis for the patient. No other traumatic injuries identified. Per record review, patient does not want any aggressive, invasive work-ups or further testing. She is deciding on whether to pursue hospice services. The following portions of the patient's history were reviewed and updated as appropriate: allergies, current medications, past family history, past medical history, past social history, past surgical history and problem list.    ROS: Review of Systems   Constitutional: Negative for fatigue and fever. Respiratory: Negative for chest tightness, shortness of breath and wheezing. Cardiovascular: Negative for leg swelling. Gastrointestinal: Negative for abdominal pain, constipation, diarrhea, nausea and vomiting.    Genitourinary: Negative for difficulty urinating and dysuria. Musculoskeletal: Positive for arthralgias, gait problem and myalgias. Chronic RLE pain   Neurological: Negative for dizziness and headaches. Psychiatric/Behavioral: Positive for dysphoric mood. Negative for confusion. The patient is not nervous/anxious. Allergies   Allergen Reactions   • Cobalt Other (See Comments)     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. Medications:    Current Outpatient Medications on File Prior to Visit   Medication Sig Dispense Refill   • Acetaminophen Extra Strength 500 MG tablet TAKE 2 TABLETS EVERY 12 HOURS 540 tablet 1   • amLODIPine (NORVASC) 5 mg tablet TAKE 1 TABLET IN THE MORNING 90 tablet 3   • aspirin 81 mg chewable tablet Chew 81 mg daily     • atorvastatin (LIPITOR) 40 mg tablet TAKE 1 TABLET DAILY 90 tablet 3   • benazepril (LOTENSIN) 20 mg tablet Take 1 tablet (20 mg total) by mouth daily 90 tablet 3   • Calcium Carbonate-Vit D-Min (CALCIUM 1200 PO) Take by mouth Take 2 tablets daily     • cholecalciferol (VITAMIN D3) 1,000 units tablet Take 1 tablet (1,000 Units total) by mouth daily 90 tablet 0   • levothyroxine 25 mcg tablet TAKE ONE AND ONE-HALF TABLETS DAILY 135 tablet 3   • Lidocaine HCl (Aspercreme Lidocaine) 4 % CREA Apply topically 4 (four) times a day as needed     • Myrbetriq 50 MG TB24 TAKE 1 TABLET DAILY 90 tablet 3   • [] oxyCODONE (ROXICODONE) 2.5 mg/2.5 mL solution Take 5 mL (5 mg total) by mouth every 4 (four) hours as needed for moderate pain for up to 16 days Max Daily Amount: 30 mg 473 mL 0   • sertraline (ZOLOFT) 100 mg tablet Take 2 tablets (200 mg total) by mouth daily 60 tablet 0     No current facility-administered medications on file prior to visit.        History:  Past Medical History:   Diagnosis Date   • Cyst of soft tissue 12/10/2021   • Disorder of bone    • Edema    • Essential hypertension    • Hyperlipemia    • Hyperlipidemia    • Hypertension    • Left shoulder pain 12/10/2021     Past Surgical History:   Procedure Laterality Date   • FL INJECTION RIGHT HIP (NON ARTHROGRAM)  6/2/2021   • FL INJECTION RIGHT HIP (NON ARTHROGRAM)  5/30/2023     Family History   Family history unknown: Yes     Social History     Socioeconomic History   • Marital status: Single     Spouse name: Not on file   • Number of children: Not on file   • Years of education: Not on file   • Highest education level: Not on file   Occupational History   • Not on file   Tobacco Use   • Smoking status: Former   • Smokeless tobacco: Never   Substance and Sexual Activity   • Alcohol use: Yes     Alcohol/week: 7.0 standard drinks of alcohol     Types: 7 Glasses of wine per week   • Drug use: No   • Sexual activity: Not on file   Other Topics Concern   • Not on file   Social History Narrative   • Not on file     Social Determinants of Health     Financial Resource Strain: Not on file   Food Insecurity: Not on file   Transportation Needs: Not on file   Physical Activity: Not on file   Stress: Not on file   Social Connections: Not on file   Intimate Partner Violence: Not on file   Housing Stability: Not on file     Past Surgical History:   Procedure Laterality Date   • FL INJECTION RIGHT HIP (NON ARTHROGRAM)  6/2/2021   • FL INJECTION RIGHT HIP (NON ARTHROGRAM)  5/30/2023       OBJECTIVE:  Vital Signs:  /54, Temp 96.8, RR 18, HR 70, SpO2 93% RA, Wgt: 142.8 lbs     Physical Exam  Constitutional:       General: She is not in acute distress. Appearance: Normal appearance. She is not ill-appearing or toxic-appearing. HENT:      Head: Normocephalic and atraumatic. Right Ear: External ear normal.      Left Ear: External ear normal.      Nose: Nose normal.      Mouth/Throat:      Mouth: Mucous membranes are moist.   Eyes:      Extraocular Movements: Extraocular movements intact. Conjunctiva/sclera: Conjunctivae normal.   Cardiovascular:      Rate and Rhythm: Normal rate and regular rhythm.       Pulses: Normal pulses. Heart sounds: Normal heart sounds. No murmur heard. Pulmonary:      Effort: Pulmonary effort is normal. No respiratory distress. Breath sounds: Normal breath sounds. No wheezing, rhonchi or rales. Abdominal:      General: Bowel sounds are normal. There is no distension. Palpations: Abdomen is soft. There is no mass. Tenderness: There is no abdominal tenderness. There is no guarding. Musculoskeletal:         General: Normal range of motion. Skin:     General: Skin is warm and dry. Neurological:      General: No focal deficit present. Mental Status: She is alert and oriented to person, place, and time. Cranial Nerves: No cranial nerve deficit. Gait: Gait abnormal.   Psychiatric:         Mood and Affect: Mood normal.         Behavior: Behavior normal.         Thought Content: Thought content normal.         Judgment: Judgment normal.         Labs & Imaging:  Lab Results   Component Value Date    WBC 5.22 07/13/2023    HGB 13.0 07/13/2023    HCT 41.1 07/13/2023    MCV 96 07/13/2023     07/13/2023     Lab Results   Component Value Date    SODIUM 143 07/13/2023    K 3.8 07/13/2023     07/13/2023    CO2 34 (H) 07/13/2023    BUN 41 (H) 07/13/2023    CREATININE 0.79 07/13/2023    GLUC 94 07/13/2023    CALCIUM 9.3 07/13/2023     Lab Results   Component Value Date    IRHKRCOV13 538 02/25/2014     Lab Results   Component Value Date    ESK4PRKABSLC 5.500 (H) 10/08/2015     No results found for: "CKBJ81EDRFOE", "[id number]"   Results for orders placed during the hospital encounter of 07/12/23    TRAUMA - CT head wo contrast    Narrative  CT BRAIN - WITHOUT CONTRAST    INDICATION:   TRAUMA. Patient fell, struck head, on aspirin, headache, denies loss of consciousness. COMPARISON: November 18, 2022. TECHNIQUE:  CT examination of the brain was performed. Multiplanar 2D reformatted images were created from the source data.     Radiation dose length product (DLP) for this visit:  1117 mGy-cm . This examination, like all CT scans performed in the [facility], was performed utilizing techniques to minimize radiation dose exposure, including the use of iterative  reconstruction and automated exposure control. IMAGE QUALITY: Limited due to artifact caused by dental work. FINDINGS:    PARENCHYMA: Decreased attenuation is noted in periventricular and subcortical white matter demonstrating an appearance that is statistically most likely to represent moderate microangiopathic change. An old lacune versus prominent perivascular space on  the left is unchanged compared to the prior study. No CT signs of acute infarction. A 9 mm right paraclinoid calcified meningioma is again seen, similar to the prior study. There is no evidence of edema within the adjacent brain. No midline shift. No acute parenchymal hemorrhage. VENTRICLES AND EXTRA-AXIAL SPACES: Meningioma as described above. No hydrocephalus. No evidence of acute extra-axial hemorrhage. VISUALIZED ORBITS: Prior lens surgery. PARANASAL SINUSES: Mild scattered mucosal thickening. No air-fluid levels. CALVARIUM AND EXTRACRANIAL SOFT TISSUES: No evidence of acute calvarial fracture. Impression  No acute intracranial abnormality. Chronic appearing findings as above. The study was marked in [facility] for immediate notification.       Workstation performed: [id number]      Ingrid Villagomez, [address] [place] [address]  07/19/2023

## 2023-07-24 ENCOUNTER — NURSING HOME VISIT (OUTPATIENT)
Dept: GERIATRICS | Facility: OTHER | Age: 88
End: 2023-07-24
Payer: MEDICARE

## 2023-07-24 DIAGNOSIS — N32.81 OVERACTIVE BLADDER: Primary | ICD-10-CM

## 2023-07-24 PROCEDURE — 99310 SBSQ NF CARE HIGH MDM 45: CPT | Performed by: NURSE PRACTITIONER

## 2023-07-24 NOTE — PROGRESS NOTES
[facility]  [address] [facility], [address] [place] [address]  [phone]    Acute Visit Note  LTC: POS 32    ASSESSMENT AND PLAN:  1. Lung mass  Assessment & Plan:  · Incidental finding on imaging during trauma work-up  · 7 cm spiculated mass  · CT of chest reviewed with patient   · No aggressive treatment work-up desired  · Patient wishes to pursue hospice level of care  · Will consult [address] [place] [address] to evaluate/treat. 2. Goals of care, counseling/discussion  Assessment & Plan:  · Goals of care reviewed  · Patient does not wish to have any aggressive treatment or further work-up for lung mass. · Hospice services discussed previously at last visit. She states today that she is wishes for comfort care. She is in agreement for hospice evaluation  · Will consult [facility] to evaluate/treat. 3. Overactive bladder  Assessment & Plan:  · History of overactive bladder on myrbetriq  · Patient complaining of increased urinary frequency  · Will place patient on watchful waiting UTI protocol. Name: Niki Oro     :   1931             Sex: Female       HPI:    Niki Oro is a 80year old female patient seen and examined to discuss goals of care. She is status post hospitalization on 2023 due to a fall on the personal care unit at [facility]. Trauma imaging reveals a 7 cm spiculated lung mass. This was a new diagnosis for the patient. No other traumatic injuries identified. Per record review, patient does not want any aggressive, invasive work-ups or further testing. The following portions of the patient's history were reviewed and updated as appropriate: allergies, current medications, past family history, past medical history, past social history, past surgical history and problem list.    ROS: Review of Systems ______  Constitutional: Negative for fatigue and fever.  ____________  Respiratory: Negative for chest tightness, shortness of breath and wheezing.  ____  Cardiovascular: Negative for leg swelling. ____  Gastrointestinal: Negative for abdominal pain, constipation, diarrhea, nausea and vomiting. ________  Genitourinary: Negative for difficulty urinating and dysuria. Positive for urinary frequency. ____  Musculoskeletal: Positive for arthralgias, gait problem and myalgias. Chronic RLE pain ____________  Neurological: Negative for dizziness and headaches. ________  Psychiatric/Behavioral: Positive for dysphoric mood. Negative for confusion. The patient is not nervous/anxious. ______      Allergies   Allergen Reactions   • Cobalt Other (See Comments)     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.         Medications:    Current Outpatient Medications on File Prior to Visit   Medication Sig Dispense Refill   • Acetaminophen Extra Strength 500 MG tablet TAKE 2 TABLETS EVERY 12 HOURS 540 tablet 1   • amLODIPine (NORVASC) 5 mg tablet TAKE 1 TABLET IN THE MORNING 90 tablet 3   • aspirin 81 mg chewable tablet Chew 81 mg daily     • atorvastatin (LIPITOR) 40 mg tablet TAKE 1 TABLET DAILY 90 tablet 3   • benazepril (LOTENSIN) 20 mg tablet Take 1 tablet (20 mg total) by mouth daily 90 tablet 3   • Calcium Carbonate-Vit D-Min (CALCIUM 1200 PO) Take by mouth Take 2 tablets daily     • cholecalciferol (VITAMIN D3) 1,000 units tablet Take 1 tablet (1,000 Units total) by mouth daily 90 tablet 0   • levothyroxine 25 mcg tablet TAKE ONE AND ONE-HALF TABLETS DAILY 135 tablet 3   • Lidocaine HCl (Aspercreme Lidocaine) 4 % CREA Apply topically 4 (four) times a day as needed     • Myrbetriq 50 MG TB24 TAKE 1 TABLET DAILY 90 tablet 3   • [] oxyCODONE (ROXICODONE) 2.5 mg/2.5 mL solution Take 5 mL (5 mg total) by mouth every 4 (four) hours as needed for moderate pain for up to 16 days Max Daily Amount: 30 mg 473 mL 0   • sertraline (ZOLOFT) 100 mg tablet Take 2 tablets (200 mg total) by mouth daily 60 tablet 0     No current facility-administered medications on file prior to visit. History:  Past Medical History:   Diagnosis Date   • Cyst of soft tissue 12/10/2021   • Disorder of bone    • Edema    • Essential hypertension    • Hyperlipemia    • Hyperlipidemia    • Hypertension    • Left shoulder pain 12/10/2021     Past Surgical History:   Procedure Laterality Date   • FL INJECTION RIGHT HIP (NON ARTHROGRAM)  6/2/2021   • FL INJECTION RIGHT HIP (NON ARTHROGRAM)  5/30/2023     Family History   Family history unknown: Yes     Social History     Socioeconomic History   • Marital status: Single     Spouse name: Not on file   • Number of children: Not on file   • Years of education: Not on file   • Highest education level: Not on file   Occupational History   • Not on file   Tobacco Use   • Smoking status: Former   • Smokeless tobacco: Never   Substance and Sexual Activity   • Alcohol use: Yes     Alcohol/week: 7.0 standard drinks of alcohol     Types: 7 Glasses of wine per week   • Drug use: No   • Sexual activity: Not on file   Other Topics Concern   • Not on file   Social History Narrative   • Not on file     Social Determinants of Health     Financial Resource Strain: Not on file   Food Insecurity: Not on file   Transportation Needs: Not on file   Physical Activity: Not on file   Stress: Not on file   Social Connections: Not on file   Intimate Partner Violence: Not on file   Housing Stability: Not on file     Past Surgical History:   Procedure Laterality Date   • FL INJECTION RIGHT HIP (NON ARTHROGRAM)  6/2/2021   • FL INJECTION RIGHT HIP (NON ARTHROGRAM)  5/30/2023       OBJECTIVE:  Vital Signs:  BP: 131/52, Temp: 98.0, RR 19, HR 62, Wgt: 142.8 lbs     Physical Exam  ______Constitutional:       General: She is not in acute distress. Appearance: Normal appearance. She is not ill-appearing or toxic-appearing.   ____HENT:      Head: Normocephalic and atraumatic.       Right Ear: External ear normal. Left Ear: External ear normal.      Nose: Nose normal.      Mouth/Throat:      Mouth: Mucous membranes are moist.   ____Eyes:      Extraocular Movements: Extraocular movements intact. Conjunctiva/sclera: Conjunctivae normal.   ________Cardiovascular:      Rate and Rhythm: Normal rate and regular rhythm. Pulses: Normal pulses. Heart sounds: Normal heart sounds. No murmur heard.  ____Pulmonary:      Effort: Pulmonary effort is normal. No respiratory distress. Breath sounds: Normal breath sounds. No wheezing, rhonchi or rales. ________Abdominal:      General: Bowel sounds are normal. There is no distension. Palpations: Abdomen is soft. There is no mass. Tenderness: There is no abdominal tenderness. There is no guarding.   ________Musculoskeletal:         General: Normal range of motion. ____________Skin:     General: Skin is warm and dry.   ____Neurological:      General: No focal deficit present. Mental Status: She is alert and oriented to person, place, and time. Cranial Nerves: No cranial nerve deficit. Gait: Gait abnormal.   ____Psychiatric:         Mood and Affect: Mood normal.         Behavior: Behavior normal.         Thought Content:  Thought content normal.         Judgment: Judgment normal.   __      Labs & Imaging:  Lab Results   Component Value Date    WBC 5.22 07/13/2023    HGB 13.0 07/13/2023    HCT 41.1 07/13/2023    MCV 96 07/13/2023     07/13/2023     Lab Results   Component Value Date    SODIUM 143 07/13/2023    K 3.8 07/13/2023     07/13/2023    CO2 34 (H) 07/13/2023    BUN 41 (H) 07/13/2023    CREATININE 0.79 07/13/2023    GLUC 94 07/13/2023    CALCIUM 9.3 07/13/2023     Lab Results   Component Value Date    ZIXOYNQO42 815 02/25/2014     Lab Results   Component Value Date    KTG0PRSFVLFZ 5.500 (H) 10/08/2015     No results found for: "[id number]", "[id number]"   Results for orders placed during the hospital encounter of 07/12/23    TRAUMA - CT head wo contrast    Narrative  CT BRAIN - WITHOUT CONTRAST    INDICATION:   TRAUMA. Patient fell, struck head, on aspirin, headache, denies loss of consciousness. COMPARISON: November 18, 2022. TECHNIQUE:  CT examination of the brain was performed. Multiplanar 2D reformatted images were created from the source data. Radiation dose length product (DLP) for this visit:  1117 mGy-cm . This examination, like all CT scans performed in the [facility], was performed utilizing techniques to minimize radiation dose exposure, including the use of iterative  reconstruction and automated exposure control. IMAGE QUALITY: Limited due to artifact caused by dental work. FINDINGS:    PARENCHYMA: Decreased attenuation is noted in periventricular and subcortical white matter demonstrating an appearance that is statistically most likely to represent moderate microangiopathic change. An old lacune versus prominent perivascular space on  the left is unchanged compared to the prior study. No CT signs of acute infarction. A 9 mm right paraclinoid calcified meningioma is again seen, similar to the prior study. There is no evidence of edema within the adjacent brain. No midline shift. No acute parenchymal hemorrhage. VENTRICLES AND EXTRA-AXIAL SPACES: Meningioma as described above. No hydrocephalus. No evidence of acute extra-axial hemorrhage. VISUALIZED ORBITS: Prior lens surgery. PARANASAL SINUSES: Mild scattered mucosal thickening. No air-fluid levels. CALVARIUM AND EXTRACRANIAL SOFT TISSUES: No evidence of acute calvarial fracture. Impression  No acute intracranial abnormality. Chronic appearing findings as above. The study was marked in [facility] for immediate notification.       Workstation performed: [id number]      Dina Coreas, [address] [place] [address]  07/24/2023

## 2023-07-30 NOTE — ASSESSMENT & PLAN NOTE
· Incidental finding on imaging during trauma work-up  · 7 cm spiculated mass  · CT of chest reviewed with patient   · No aggressive treatment work-up desired  · Patient is currently undecided whether to pursue hospice services. · Continue supportive palliative oriented care.

## 2023-07-30 NOTE — ASSESSMENT & PLAN NOTE
· Goals of care reviewed  · Patient does not wish to have any aggressive treatment or further work-up for lung mass. · Hospice services discussed. Patient is undecided at this time. Will continue supportive palliative oriented care.

## 2023-08-03 NOTE — ASSESSMENT & PLAN NOTE
· Completed PT course with some improvement  · Encouraged to stay as active as possible  · Continue to ambulate with rollator  · Continue fall precautions 
· Currently on lipitor 40 mg po daily  · Most recent lipid panel done in May 2019 stable wit hChol 178, tgc 80, HDL 75, LDL 87 
· Mood remains stable  · Continue Sertraline 50 mg po QD 
· Patient previously on Myrbetriq 50 mg po solomon, states it was helping throughout the day but she is still very uncomfortable at night with frequent awakenings  · I have recommended to try taking Myrbetriq at 3pm every day  · Recommend to avoid taking fluids 90 min prior to her bedtime  · Discussed at length other options and possible available interventions  She is reluctant to seeing Uro-Gyn but agreed to a referral and will think about making an appointment 
· Seen by Dr Hugo Ramirez, now s/p trochanteric steroid injection for bursitis  · Now very much improved 
· Stable  · Continue current dose of levothyroxine  · Last TSH done 5/28/19 stable at 2 5 
· Stable BP  · Continue Benazepril 20 mg daily  · Will order BMP to be done prior to next visit to monitor renal function and electrolytes 
· myrbetriq to be taken at 3 pm every day  · Referred to Dr Noah Pope of Uro-Gyn  Will think about it 
will be numb for a few hours after the procedure    [x] I understand if a steroid was used it will take effect in 3 - 7 days. I understand that as the numbing medication wears off, the pain may return until the steroids start working. [x] I understand that today I will rest for the next 24 hours and drink plenty of water. [] For Botox for Migraines please do not wear anything constricting around the forehead for 7-10 days post injection. Examples headband, hats, or bandana    [] For Botox for Spasticity start therapy for the affected limb in two weeks. [] Additional instructions: ______________________________________________ ___________________________________________________________________    Sedation:  Was oral sedation given? [] Yes  [x] No    I understand that if I took an oral nerve calming medication I will not drive for  [] 24 hours after taking the medication.     [x] I have received a copy of my discharge instructions and understand the above instructions to the best of my knowledge    Patient Discharged:  [x] Home  [] Hospital  [] Other  ____________________________________________    Via:  [x] Ambulatory  [] Wheelchair   [] Stretcher [] EMS       Accompanied By:   [] Significant other  [x] Family Member  [] Friend   [] Hospital Staff  []  Ambulance Staff  [] Other_______________________________________________    Plan:  [x] Will return to the office in   [] 1 month   [] 3 months for:  [] Procedure Follow-up  [] Office Visit   [] Planned Procedure      Patient Signature: _____________________________________________________    Staff Signature: _______________________________________________________        If you have questions, problems or concerns you may call [phone] and follow the prompts

## 2023-08-10 PROBLEM — R53.81 DEBILITY: Status: ACTIVE | Noted: 2023-08-10

## 2023-08-10 NOTE — ASSESSMENT & PLAN NOTE
Multifactorial  Admit to SNF for LTC  PT/OT consults placed- evaluate and treat  Supportive care, nutritional support, ADL support  Fall precautions  Management of acute and chronic medical conditions as outlined

## 2023-08-10 NOTE — ASSESSMENT & PLAN NOTE
Goal <140/90; ideally 130/80 in setting of AAA  Avoid hypotension- SBP trending 100s-140s since SNF admission  Continue amlodipine, benazepril; plan to d/c amlodipine if SBP consistently <110  BMP ordered

## 2023-08-10 NOTE — ASSESSMENT & PLAN NOTE
With advanced degenerative changes noted per imaging of the cervical spine, degenerative changes on lumbar spine and hips  Continue scheduled acetaminophen; max 3g/24h; topical lidocaine cream

## 2023-08-10 NOTE — ASSESSMENT & PLAN NOTE
Continue myrbetriq; consider dose reduction and d/c if incontinence remains despite use of medication  Recommend scheduled toileting every 2-3 hours while awake

## 2023-08-10 NOTE — ASSESSMENT & PLAN NOTE
Continue levothyroxine at current dose  Plan to check TSH, free T4 with next routine labs- can defer if transitions to hospice care

## 2023-08-15 ENCOUNTER — NURSING HOME VISIT (OUTPATIENT)
Dept: GERIATRICS | Facility: OTHER | Age: 88
End: 2023-08-15
Payer: MEDICARE

## 2023-08-15 DIAGNOSIS — R53.81 DEBILITY: ICD-10-CM

## 2023-08-15 DIAGNOSIS — I10 ESSENTIAL HYPERTENSION: ICD-10-CM

## 2023-08-15 DIAGNOSIS — R91.8 LUNG MASS: Primary | ICD-10-CM

## 2023-08-15 DIAGNOSIS — M15.9 OSTEOARTHRITIS OF MULTIPLE JOINTS, UNSPECIFIED OSTEOARTHRITIS TYPE: ICD-10-CM

## 2023-08-15 DIAGNOSIS — E03.9 ACQUIRED HYPOTHYROIDISM: ICD-10-CM

## 2023-08-15 PROCEDURE — 99309 SBSQ NF CARE MODERATE MDM 30: CPT | Performed by: FAMILY MEDICINE

## 2023-08-15 NOTE — PROGRESS NOTES
[address] [facility]. [facility]  Progress Note  POS: Nursing Facility/LTC-32  Hospice Patient    Chief Complaint/Reason for visit: Follow up of chronic medical conditions   History of Present Illness: 80year old female evaluated for routine follow up of chronic medical conditions. Transitioned to hospice care on 7/25/23. Intermittent use of PRN tramadol for pain noted. Review of systems: Review of Systems   Constitutional: Negative for chills and fever. Respiratory: Negative for shortness of breath. Musculoskeletal: Positive for arthralgias. Psychiatric/Behavioral: The patient is nervous/anxious. Medications: Changes made- see written orders    Physical Exam    Vitals reviewed in SNF EMR    Physical Exam  Vitals and nursing note reviewed. Constitutional:       General: She is awake. She is not in acute distress. Appearance: She is well-groomed. She is not toxic-appearing or diaphoretic. HENT:      Head: Normocephalic and atraumatic. Nose: No rhinorrhea. Mouth/Throat:      Mouth: Mucous membranes are moist.   Eyes:      General: No scleral icterus. Right eye: No discharge. Left eye: No discharge. Conjunctiva/sclera: Conjunctivae normal.   Pulmonary:      Effort: Pulmonary effort is normal. No respiratory distress. Abdominal:      General: There is no distension. Musculoskeletal:      Cervical back: No rigidity. Skin:     Coloration: Skin is not jaundiced or pale. Neurological:      Mental Status: She is alert. Mental status is at baseline. Psychiatric:         Behavior: Behavior is cooperative.          Assessment/Plan:  80year old female with:    Lung mass  LLL spiculated with high suspicion for malignancy; patient declined further workup or intervention  Transitioned to hospice care  Monitor respiratory status closely    Osteoarthritis of multiple joints  With advanced degenerative changes noted previously per imaging of the cervical spine, lumbar spine and hips  Continue scheduled acetaminophen; max 3g/24h; topical lidocaine cream; tramadol PRN for severe pain    Essential hypertension  Goal <140/90  Continue benazepril  Amlodipine discontinued  Monitor BP weekly on hospice care to ensure continued appropriateness of medications    Acquired hypothyroidism  Continue levothyroxine at current dose    Debility  Multifactorial  Continue SNF for LTC  Supportive care, nutritional support, ADL support  Fall precautions  Management of chronic medical conditions as outlined    Hospice care patient  [facility]   D/c aspirin, calcium-Vitamin D, atorvastatin, Vitamin D    Jaki Davila,   8/15/23

## 2023-09-05 PROBLEM — Z51.5 HOSPICE CARE PATIENT: Status: ACTIVE | Noted: 2023-09-05

## 2023-09-06 NOTE — ASSESSMENT & PLAN NOTE
With advanced degenerative changes noted previously per imaging of the cervical spine, lumbar spine and hips  Continue scheduled acetaminophen; max 3g/24h; topical lidocaine cream; tramadol PRN for severe pain

## 2023-09-06 NOTE — ASSESSMENT & PLAN NOTE
LLL spiculated with high suspicion for malignancy; patient declined further workup or intervention  Transitioned to hospice care  Monitor respiratory status closely

## 2023-09-06 NOTE — ASSESSMENT & PLAN NOTE
Goal <140/90  Continue benazepril  Amlodipine discontinued  Monitor BP weekly on hospice care to ensure continued appropriateness of medications

## 2023-09-06 NOTE — ASSESSMENT & PLAN NOTE
Multifactorial  Continue SNF for LTC  Supportive care, nutritional support, ADL support  Fall precautions  Management of chronic medical conditions as outlined

## 2023-09-14 ENCOUNTER — NURSING HOME VISIT (OUTPATIENT)
Dept: GERIATRICS | Facility: OTHER | Age: 88
End: 2023-09-14
Payer: MEDICARE

## 2023-09-14 DIAGNOSIS — R53.81 DEBILITY: ICD-10-CM

## 2023-09-14 DIAGNOSIS — M15.9 OSTEOARTHRITIS OF MULTIPLE JOINTS, UNSPECIFIED OSTEOARTHRITIS TYPE: ICD-10-CM

## 2023-09-14 DIAGNOSIS — K59.00 CONSTIPATION, UNSPECIFIED CONSTIPATION TYPE: Primary | ICD-10-CM

## 2023-09-14 DIAGNOSIS — I10 ESSENTIAL HYPERTENSION: ICD-10-CM

## 2023-09-14 DIAGNOSIS — R91.8 LUNG MASS: ICD-10-CM

## 2023-09-14 DIAGNOSIS — Z51.5 HOSPICE CARE PATIENT: ICD-10-CM

## 2023-09-14 DIAGNOSIS — E03.9 ACQUIRED HYPOTHYROIDISM: ICD-10-CM

## 2023-09-14 PROCEDURE — 99309 SBSQ NF CARE MODERATE MDM 30: CPT | Performed by: FAMILY MEDICINE

## 2023-09-14 RX ORDER — POLYVINYL ALCOHOL, POVIDONE .5; .6 G/100ML; G/100ML
2 LIQUID OPHTHALMIC 2 TIMES DAILY
COMMUNITY

## 2023-09-14 RX ORDER — LACTULOSE 10 G/15ML
20 SOLUTION ORAL DAILY PRN
COMMUNITY

## 2023-09-14 RX ORDER — LIDOCAINE 4 G/G
1 PATCH TOPICAL DAILY
COMMUNITY

## 2023-09-14 RX ORDER — SENNA AND DOCUSATE SODIUM 50; 8.6 MG/1; MG/1
4 TABLET, FILM COATED ORAL DAILY
COMMUNITY

## 2023-09-14 RX ORDER — MORPHINE SULFATE 100 MG/5ML
5 SOLUTION ORAL 2 TIMES DAILY
COMMUNITY

## 2023-09-14 RX ORDER — OMEPRAZOLE 20 MG/1
20 CAPSULE, DELAYED RELEASE ORAL DAILY
COMMUNITY

## 2023-09-14 RX ORDER — CALCIUM CARBONATE 500 MG/1
1 TABLET, CHEWABLE ORAL 4 TIMES DAILY PRN
COMMUNITY

## 2023-09-14 RX ORDER — LORAZEPAM 0.5 MG/1
0.5 TABLET ORAL EVERY 4 HOURS PRN
COMMUNITY

## 2023-09-14 NOTE — ASSESSMENT & PLAN NOTE
LLL spiculated with high suspicion for malignancy; patient previously declined further workup or intervention  Continue hospice care  Monitor respiratory status closely

## 2023-09-14 NOTE — ASSESSMENT & PLAN NOTE
In setting of opioid use for pain control in a palliative care patient, immobility  Encourage PO fluid intake as tolerated  Continue senna-s scheduled + PRN bowel regimen

## 2023-09-14 NOTE — ASSESSMENT & PLAN NOTE
Goal <140/90  Continue benazepril  Amlodipine previously discontinued  Monitor BP weekly on hospice care to ensure continued appropriateness of medications; dose reduce benazepril if SBP consistently <120

## 2023-09-14 NOTE — ASSESSMENT & PLAN NOTE
With advanced degenerative changes noted previously per imaging of the cervical spine, lumbar spine and hips  Continue scheduled acetaminophen; max 3g/24h; topical lidocaine cream; scheduled + PRN morphine added with improvement in pain control overall

## 2023-09-14 NOTE — PROGRESS NOTES
[address] [facility]. [facility]  Progress Note  POS: Nursing Facility/LTC-32  Hospice Patient    Chief Complaint/Reason for visit: Follow up of chronic medical conditions  History of Present Illness: 80year old female evaluated for routine follow up of chronic medical conditions. Continues on hospice care. Now on scheduled morphine BID in addition to scheduled acetaminophen and topical lidocaine with improved pain control overall; has used PNR morphine x1 over past 48h. Review of systems: Review of Systems   Gastrointestinal: Positive for constipation. Musculoskeletal: Positive for arthralgias. Medications: No changes made    Physical Exam    Temp:96.8F BP:130/52 Pulse:68 Resp:16 O2 Sat:94% RA    Physical Exam  Vitals and nursing note reviewed. Constitutional:       General: She is awake. She is not in acute distress. Appearance: She is not toxic-appearing or diaphoretic. HENT:      Head: Normocephalic and atraumatic. Nose: No rhinorrhea. Mouth/Throat:      Mouth: Mucous membranes are moist.   Eyes:      General:         Right eye: No discharge. Left eye: No discharge. Pulmonary:      Effort: Pulmonary effort is normal. No respiratory distress. Abdominal:      General: There is no distension. Musculoskeletal:      Cervical back: No rigidity. Right lower leg: Edema (trace b/l) present. Left lower leg: Edema present. Skin:     Coloration: Skin is pale. Skin is not jaundiced. Neurological:      Mental Status: She is alert. Mental status is at baseline. Psychiatric:         Behavior: Behavior is cooperative.        Assessment/Plan:  80year old female with:    Constipation  In setting of opioid use for pain control in a palliative care patient, immobility  Encourage PO fluid intake as tolerated  Continue senna-s scheduled + PRN bowel regimen    Acquired hypothyroidism  Continue levothyroxine at current dose- consider checking TSH if constipation worsens, would need to review with hospice team    Essential hypertension  Goal <140/90  Continue benazepril  Amlodipine previously discontinued  Monitor BP weekly on hospice care to ensure continued appropriateness of medications; dose reduce benazepril if SBP consistently <120    Osteoarthritis of multiple joints  With advanced degenerative changes noted previously per imaging of the cervical spine, lumbar spine and hips  Continue scheduled acetaminophen; max 3g/24h; topical lidocaine cream; scheduled + PRN morphine added with improvement in pain control overall    Lung mass  LLL spiculated with high suspicion for malignancy; patient previously declined further workup or intervention  Continue hospice care  Monitor respiratory status closely    Debility  Multifactorial  Continue SNF for LTC  Supportive care, nutritional support, ADL support  Fall precautions  Management of chronic medical conditions as outlined    Hospice care patient  [address], DO  9/14/23

## 2023-09-14 NOTE — ASSESSMENT & PLAN NOTE
Continue levothyroxine at current dose- consider checking TSH if constipation worsens, would need to review with hospice team

## 2023-09-25 ENCOUNTER — NURSING HOME VISIT (OUTPATIENT)
Dept: GERIATRICS | Facility: OTHER | Age: 88
End: 2023-09-25
Payer: MEDICARE

## 2023-09-25 DIAGNOSIS — H90.3 SENSORINEURAL HEARING LOSS (SNHL) OF BOTH EARS: Primary | ICD-10-CM

## 2023-09-25 PROCEDURE — 99308 SBSQ NF CARE LOW MDM 20: CPT | Performed by: NURSE PRACTITIONER

## 2023-09-25 NOTE — ASSESSMENT & PLAN NOTE
· No cerumen impaction present on examination  · Consult Audiology to clean hearing aids  · Continue supportive care.

## 2023-09-25 NOTE — PROGRESS NOTES
[address]  [phone] [address] [facility]   Acute Visit Note  LTC: POS 32  Hospice patient      NAME: Sandip Ledesma  AGE: 80 y.o. SEX: female  :  1931  DATE OF ENCOUNTER: 2023    Chief Complaint   Patient seen and examined for hearing loss     History of Present Illness     80year old female patient seen and examined today for complaint of worsening bilateral hearing loss. She denies ear pain, ear drainage, fever, chills, headache, dizziness. She wears bilateral hearing aids and states that she thinks that they need to be cleaned. The following portions of the patient's history were reviewed and updated as appropriate: allergies, current medications, past family history, past medical history, past social history, past surgical history and problem list.    Review of Systems     A review of systems was performed. All negative, except as per HPI. History     Past Medical History:   Diagnosis Date   • Cyst of soft tissue 12/10/2021   • Disorder of bone    • Edema    • Essential hypertension    • Hyperlipemia    • Hyperlipidemia    • Hypertension    • Left shoulder pain 12/10/2021     Past Surgical History:   Procedure Laterality Date   • FL INJECTION RIGHT HIP (NON ARTHROGRAM)  2021   • FL INJECTION RIGHT HIP (NON ARTHROGRAM)  2023     Family History   Family history unknown: Yes     Social History     Socioeconomic History   • Marital status: Single     Spouse name: Not on file   • Number of children: Not on file   • Years of education: Not on file   • Highest education level: Not on file   Occupational History   • Not on file   Tobacco Use   • Smoking status: Former   • Smokeless tobacco: Never   Substance and Sexual Activity   • Alcohol use:  Yes     Alcohol/week: 7.0 standard drinks of alcohol     Types: 7 Glasses of wine per week   • Drug use: No   • Sexual activity: Not on file   Other Topics Concern   • Not on file   Social History Narrative   • Not on file     Social Determinants of Health     Financial Resource Strain: Not on file   Food Insecurity: Not on file   Transportation Needs: Not on file   Physical Activity: Not on file   Stress: Not on file   Social Connections: Not on file   Intimate Partner Violence: Not on file   Housing Stability: Not on file     Allergies   Allergen Reactions   • Cobalt Other (See Comments)     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. Objective     General: NAD, Well Nourished, Well Developed  Neck: Supple, +ROM  Ears:  EAC clear, no bilateral cerumen impaction, bilateral TM intact, no erythema, no drainage present  Pulmonary: Respirations unlabored  Skin: Warm, Dry  Psych: Alert and oriented times 3, no mood, no affect, good judgement. Current Medications     Current Medications Reviewed and updated in Nursing Home EMR. Assessment and Plan     Sensorineural hearing loss (SNHL) of both ears  · No cerumen impaction present on examination  · Consult Audiology to clean hearing aids  · Continue supportive care.        [address] [place] [address]  Geriatric Medicine  9/25/2023

## 2023-10-18 ENCOUNTER — NURSING HOME VISIT (OUTPATIENT)
Dept: GERIATRICS | Facility: OTHER | Age: 88
End: 2023-10-18
Payer: MEDICARE

## 2023-10-18 DIAGNOSIS — Z51.5 HOSPICE CARE PATIENT: ICD-10-CM

## 2023-10-18 DIAGNOSIS — R53.81 DEBILITY: ICD-10-CM

## 2023-10-18 DIAGNOSIS — M15.9 OSTEOARTHRITIS OF MULTIPLE JOINTS, UNSPECIFIED OSTEOARTHRITIS TYPE: Primary | ICD-10-CM

## 2023-10-18 DIAGNOSIS — I10 ESSENTIAL HYPERTENSION: ICD-10-CM

## 2023-10-18 DIAGNOSIS — R91.8 LUNG MASS: ICD-10-CM

## 2023-10-18 PROCEDURE — 99309 SBSQ NF CARE MODERATE MDM 30: CPT | Performed by: FAMILY MEDICINE

## 2023-10-18 NOTE — PROGRESS NOTES
[address] [facility]. [facility]  Progress Note  POS: Nursing Facility/LTC-32  Hospice Patient    Chief Complaint/Reason for visit: Follow up of chronic medical conditions   History of Present Illness: 80year old female evaluated for routine follow up of chronic medical conditions. Continues on hospice care. Intermittent use of PRN medications for pain with good effectiveness reported. Review of systems: Review of Systems   Constitutional:  Negative for appetite change, chills and fever. Respiratory:  Negative for cough and shortness of breath. Musculoskeletal:  Positive for arthralgias. Medications: No changes made    Physical Exam    Physical Exam  Vitals and nursing note reviewed. Constitutional:       General: She is awake. She is not in acute distress. Appearance: She is well-groomed. She is not toxic-appearing or diaphoretic. HENT:      Head: Normocephalic and atraumatic. Nose: No rhinorrhea. Mouth/Throat:      Mouth: Mucous membranes are moist.   Eyes:      General:         Right eye: No discharge. Left eye: No discharge. Conjunctiva/sclera: Conjunctivae normal.   Pulmonary:      Effort: Pulmonary effort is normal. No respiratory distress. Musculoskeletal:      Cervical back: No rigidity. Skin:     General: Skin is warm and dry. Coloration: Skin is not jaundiced or pale. Neurological:      Mental Status: She is alert. Mental status is at baseline. Psychiatric:         Behavior: Behavior is cooperative.        Assessment/Plan:  80year old female with:    Osteoarthritis of multiple joints  With advanced degenerative changes noted previously per imaging of the cervical spine, lumbar spine and hips  Continue scheduled acetaminophen; max 3g/24h; topical lidocaine cream; scheduled + PRN morphine added with improvement in pain control overall    Essential hypertension  Goal <140/90  Continue benazepril  Amlodipine previously discontinued  Monitor BP monthly on hospice care to ensure continued appropriateness of medications; dose reduce benazepril if SBP consistently <120    Lung mass  LLL spiculated with high suspicion for malignancy; patient previously declined further workup or intervention  Continue hospice care  Monitor respiratory status closely    Debility  Multifactorial  Continue SNF for LTC  Supportive care, nutritional support, ADL support  Fall precautions  Management of chronic medical conditions as outlined    Hospice care patient  [address] Dumont, DO  10/18/23

## 2023-12-13 ENCOUNTER — NURSING HOME VISIT (OUTPATIENT)
Dept: GERIATRICS | Facility: OTHER | Age: 88
End: 2023-12-13
Payer: MEDICARE

## 2023-12-13 DIAGNOSIS — R91.8 LUNG MASS: Primary | ICD-10-CM

## 2023-12-13 DIAGNOSIS — Z51.5 HOSPICE CARE PATIENT: ICD-10-CM

## 2023-12-13 DIAGNOSIS — E03.9 ACQUIRED HYPOTHYROIDISM: ICD-10-CM

## 2023-12-13 DIAGNOSIS — I10 ESSENTIAL HYPERTENSION: ICD-10-CM

## 2023-12-13 DIAGNOSIS — R53.81 DEBILITY: ICD-10-CM

## 2023-12-13 PROCEDURE — 99309 SBSQ NF CARE MODERATE MDM 30: CPT | Performed by: FAMILY MEDICINE

## 2023-12-13 NOTE — PROGRESS NOTES
[address] [facility]  Progress Note  POS: Nursing Facility/LTC-32  Hospice Patient    Chief Complaint/Reason for visit: Follow up of chronic medical conditions  History of Present Illness: 80year old female evaluated for routine follow up of chronic medical conditions. Continues on hospice care. Ongoing adjustment of medications per hospice time for pain. Review of systems: Review of Systems   Unable to perform ROS: Mental status change   Constitutional:  Positive for activity change and appetite change. Musculoskeletal:  Positive for arthralgias. Psychiatric/Behavioral:  Positive for agitation (intermittent) and confusion. Medications: Changes made- see written orders  Consults reviewed:Hospice    Physical Exam    Physical Exam  Vitals and nursing note reviewed. Constitutional:       General: She is sleeping. She is not in acute distress. Appearance: She is well-groomed. She is not toxic-appearing or diaphoretic. Interventions: Nasal cannula in place. HENT:      Head: Normocephalic. Nose: No rhinorrhea. Eyes:      General:         Right eye: No discharge. Left eye: No discharge. Pulmonary:      Effort: Pulmonary effort is normal. No respiratory distress. Skin:     Coloration: Skin is pale. Skin is not jaundiced. Neurological:      Cranial Nerves: No facial asymmetry.        Assessment/Plan:  80year old female with:    Lung mass  LLL spiculated with high suspicion for malignancy; patient previously declined further workup or intervention  Continue hospice care  Monitor respiratory status closely- supplemental oxygen and comfort medications as needed for symptom control    Essential hypertension  Goal <140/90  Continue benazepril  Amlodipine previously discontinued  Monitor BP monthly on hospice care to ensure continued appropriateness of medications; dose reduce benazepril if SBP consistently <120    Acquired hypothyroidism  Continue levothyroxine    Debility  Multifactorial  Continue SNF for LTC for 24/7 and ADL care  Supportive care, nutritional support  Fall precautions  Management of chronic medical conditions as outlined    Hospice care patient  [address] Jessica Dumont, DO  12/13/23

## 2023-12-14 NOTE — ASSESSMENT & PLAN NOTE
Multifactorial  Continue SNF for LTC for 24/7 and ADL care  Supportive care, nutritional support  Fall precautions  Management of chronic medical conditions as outlined

## 2023-12-14 NOTE — ASSESSMENT & PLAN NOTE
LLL spiculated with high suspicion for malignancy; patient previously declined further workup or intervention  Continue hospice care  Monitor respiratory status closely- supplemental oxygen and comfort medications as needed for symptom control

## 2023-12-14 NOTE — ASSESSMENT & PLAN NOTE
Goal <140/90  Continue benazepril  Amlodipine previously discontinued  Monitor BP monthly on hospice care to ensure continued appropriateness of medications; dose reduce benazepril if SBP consistently <120

## 2024-05-15 NOTE — PROGRESS NOTES
Assessment & Plan:     Essential hypertension  -Blood pressure well controlled  -Continue current medications  -reviewed most recent BMP  Renal function electrolytes within normal limits  Depression  - Mood is doing well after increase in zoloft  - Will continue  - Will obtain BMP to follow up on sodium and kidney function    Ambulatory dysfunction  -Patient ambulates with a walker at baseline  She admits to poor compliance with home exercises  Denies any recent falls but admits to fear of falling which has made her drop many activities she used to enjoy, such as S[employer]ism  Discussed possibility of resuming PT to work on fear of falling and balance, she declined at this time and wishes to wait to discuss at next visit  -Strongly encouraged to resume home exercises as prescribed by PT  Acquired hypothyroidism  - Will obtain TSH and continue levothyroxine at same dose for now  Overactive bladder  Patient currently on Myrbetriq daily  She is tolerating it well  Will increase to 50mg QD to achieve even better control  States she is having less incontinence overnight with frequent awakenings to void  Screening for osteoporosis  - Considering patients loss of height and age will screen patient for osteoporosis with dexa scan  - Will follow up on results and after review will consider medication if needed  - Patient advised to increase calcium/vitamin D and weight bearing exercise  HPI:  We had the pleasure of evaluating Mejia Mukherjee who is a 80 y o  female in Geriatric follow up today  Ms Don Guerra is in the office with             ROS: Review of Systems   Constitutional: Negative for activity change, appetite change, fatigue, fever and unexpected weight change  HENT: Negative for congestion, dental problem, hearing loss and trouble swallowing  Eyes: Negative for visual disturbance     Respiratory: Negative for apnea, cough, choking, chest tightness and shortness of May 17, 2024      Vale Romero  [address] [place] [address]        Dear Ms.Kellen Romero,    We are writing to inform you of your test results.    Cholesterol is slightly high but no need for medication at this time.   Rest of the labs are normal.     Resulted Orders   HIV Antigen Antibody Combo   Result Value Ref Range    HIV Antigen Antibody Combo Nonreactive Nonreactive      Comment:      Negative HIV-1 p24 antigen and HIV-1/2 antibody screening test results usually indicate the absence of HIV-1 and HIV-2 infection. However, such negative results do not rule-out acute HIV infection.  If acute HIV-1 or HIV-2 infection is suspected, detection of HIV-1 or HIV-2 RNA  is recommended.    Hepatitis C Screen Reflex to HCV RNA Quant and Genotype   Result Value Ref Range    Hepatitis C Antibody Nonreactive Nonreactive      Comment:      A nonreactive screening test result does not exclude the possibility of exposure to or infection with HCV. Nonreactive screening test results in individuals with prior exposure to HCV may be due to antibody levels below the limit of detection of this assay or lack of reactivity to the HCV antigens used in this assay. Patients with recent HCV infections (<3 months from time of exposure) may have false-negative HCV antibody results due to the time needed for seroconversion (average of 8 to 9 weeks).   Lipid panel reflex to direct LDL Non-fasting   Result Value Ref Range    Cholesterol 195 <200 mg/dL    Triglycerides 61 <150 mg/dL    Direct Measure HDL 54 >=50 mg/dL    LDL Cholesterol Calculated 129 (H) <=100 mg/dL    Non HDL Cholesterol 141 (H) <130 mg/dL    Patient Fasting > 8hrs? Yes     Narrative    Cholesterol  Desirable:  <200 mg/dL    Triglycerides  Normal:  Less than 150 mg/dL  Borderline High:  150-199 mg/dL  High:  200-499 mg/dL  Very High:  Greater than or equal to 500 mg/dL    Direct Measure HDL  Female:  Greater than or equal to 50 mg/dL   Male:  Greater than  breath  Cardiovascular: Negative for chest pain, palpitations and leg swelling  Gastrointestinal: Negative for abdominal distention, abdominal pain, constipation, diarrhea, nausea and vomiting  Genitourinary: Negative for difficulty urinating, dysuria and flank pain  Musculoskeletal: Negative for arthralgias and gait problem  Skin: Negative for rash and wound  Neurological:        Balance issues   Psychiatric/Behavioral: Negative for agitation, behavioral problems, confusion and sleep disturbance  Past Medical, surgical, social, medication and allergy history and patients previous records reviewed      Allergies:   No Known Allergies    Medications:      Current Outpatient Prescriptions:     amLODIPine (NORVASC) 5 mg tablet, Take 1 tablet (5 mg total) by mouth daily, Disp: 90 tablet, Rfl: 0    aspirin 81 MG tablet, Take 81 mg by mouth, Disp: , Rfl:     atorvastatin (LIPITOR) 40 mg tablet, TAKE 1 TABLET DAILY, Disp: 90 tablet, Rfl: 0    benazepril (LOTENSIN) 20 mg tablet, Take 1 tablet (20 mg total) by mouth daily, Disp: 90 tablet, Rfl: 0    Calcium Carbonate-Vit D-Min (CALCIUM 1200 PO), Take by mouth Take 2 tablets daily, Disp: , Rfl:     cholecalciferol (VITAMIN D3) 1,000 units tablet, Take 1,000 Units by mouth daily, Disp: , Rfl:     conjugated estrogens (PREMARIN) vaginal cream, Premarin 0 625 mg/gram vaginal cream, Disp: , Rfl:     levothyroxine (SYNTHROID) 25 mcg tablet, Take 1 tablet (25 mcg total) by mouth daily, Disp: 90 tablet, Rfl: 3    Mirabegron ER 25 MG TB24, Take 25 mg by mouth daily for 30 days, Disp: 750 mg, Rfl: 0    Mirabegron ER 50 MG TB24, Take 1 tablet (50 mg total) by mouth daily, Disp: 90 tablet, Rfl: 1    sertraline (ZOLOFT) 50 mg tablet, Take 1 tablet (50 mg total) by mouth daily, Disp: 90 tablet, Rfl: 3    Vitals:  Vitals:    01/15/19 1606   BP: 144/70   Pulse: 64   Resp: 16   Temp: 97 8 °F (36 6 °C)       History:  Past Medical History:   Diagnosis Date    or equal to 40 mg/dL    LDL Cholesterol  Desirable:  <100mg/dL  Above Desirable:  100-129 mg/dL   Borderline High:  130-159 mg/dL   High:  160-189 mg/dL   Very High:  >= 190 mg/dL    Non HDL Cholesterol  Desirable:  130 mg/dL  Above Desirable:  130-159 mg/dL  Borderline High:  160-189 mg/dL  High:  190-219 mg/dL  Very High:  Greater than or equal to 220 mg/dL   CBC with platelets   Result Value Ref Range    WBC Count 4.7 4.0 - 11.0 10e3/uL    RBC Count 4.46 3.80 - 5.20 10e6/uL    Hemoglobin 13.0 11.7 - 15.7 g/dL    Hematocrit 39.8 35.0 - 47.0 %    MCV 89 78 - 100 fL    MCH 29.1 26.5 - 33.0 pg    MCHC 32.7 31.5 - 36.5 g/dL    RDW 12.3 10.0 - 15.0 %    Platelet Count 217 150 - 450 10e3/uL   Comprehensive metabolic panel   Result Value Ref Range    Sodium 138 135 - 145 mmol/L      Comment:      Reference intervals for this test were updated on 09/26/2023 to more accurately reflect our healthy population. There may be differences in the flagging of prior results with similar values performed with this method. Interpretation of those prior results can be made in the context of the updated reference intervals.     Potassium 4.0 3.4 - 5.3 mmol/L    Carbon Dioxide (CO2) 26 22 - 29 mmol/L    Anion Gap 11 7 - 15 mmol/L    Urea Nitrogen 12.8 8.0 - 23.0 mg/dL    Creatinine 0.80 0.51 - 0.95 mg/dL    GFR Estimate 83 >60 mL/min/1.73m2    Calcium 10.0 8.8 - 10.2 mg/dL    Chloride 101 98 - 107 mmol/L    Glucose 82 70 - 99 mg/dL    Alkaline Phosphatase 95 40 - 150 U/L      Comment:      Reference intervals for this test were updated on 11/14/2023 to more accurately reflect our healthy population. There may be differences in the flagging of prior results with similar values performed with this method. Interpretation of those prior results can be made in the context of the updated reference intervals.    AST 21 0 - 45 U/L      Comment:      Reference intervals for this test were updated on 6/12/2023 to more accurately reflect our  Disorder of bone     Edema     Essential hypertension     Hyperlipemia     Hyperlipidemia     Hypertension      No past surgical history on file  No family history on file  Social History     Social History    Marital status: Single     Spouse name: N/A    Number of children: N/A    Years of education: N/A     Occupational History    Not on file  Social History Main Topics    Smoking status: Former Smoker    Smokeless tobacco: Never Used    Alcohol use 4 2 oz/week     7 Glasses of wine per week    Drug use: No    Sexual activity: Not on file     Other Topics Concern    Not on file     Social History Narrative    No narrative on file     No past surgical history on file  Physical Exam:   Physical Exam   Constitutional: She appears well-developed  No distress  HENT:   Head: Normocephalic and atraumatic  Right Ear: External ear normal    Left Ear: External ear normal    Mouth/Throat: Oropharynx is clear and moist  No oropharyngeal exudate  Eyes: Pupils are equal, round, and reactive to light  Conjunctivae are normal  Right eye exhibits no discharge  Left eye exhibits no discharge  No scleral icterus  Neck: No JVD present  No tracheal deviation present  Cardiovascular: Normal rate, regular rhythm, normal heart sounds and intact distal pulses  Exam reveals no gallop and no friction rub  No murmur heard  Pulmonary/Chest: Breath sounds normal  No respiratory distress  Abdominal: Soft  Bowel sounds are normal  She exhibits no distension  There is no tenderness  Musculoskeletal: She exhibits no edema, tenderness or deformity  Neurological: She is alert  She displays normal reflexes  No cranial nerve deficit  Skin: No rash noted  She is not diaphoretic  No erythema  No pallor  healthy population. There may be differences in the flagging of prior results with similar values performed with this method. Interpretation of those prior results can be made in the context of the updated reference intervals.    ALT 25 0 - 50 U/L      Comment:      Reference intervals for this test were updated on 6/12/2023 to more accurately reflect our healthy population. There may be differences in the flagging of prior results with similar values performed with this method. Interpretation of those prior results can be made in the context of the updated reference intervals.      Protein Total 7.8 6.4 - 8.3 g/dL    Albumin 4.5 3.5 - 5.2 g/dL    Bilirubin Total 0.5 <=1.2 mg/dL    Patient Fasting > 8hrs? Yes    TSH with free T4 reflex   Result Value Ref Range    TSH 1.13 0.30 - 4.20 uIU/mL       If you have any questions or concerns, please call the clinic at the number listed above.       Sincerely,      David Perla MD